# Patient Record
Sex: FEMALE | Race: WHITE | NOT HISPANIC OR LATINO | Employment: OTHER | URBAN - METROPOLITAN AREA
[De-identification: names, ages, dates, MRNs, and addresses within clinical notes are randomized per-mention and may not be internally consistent; named-entity substitution may affect disease eponyms.]

---

## 2017-01-20 ENCOUNTER — TELEPHONE (OUTPATIENT)
Dept: OBSTETRICS AND GYNECOLOGY | Facility: CLINIC | Age: 62
End: 2017-01-20

## 2017-01-20 NOTE — TELEPHONE ENCOUNTER
1 year since last period.  C/o Insomnia, hot flashes, and her joints feel funny.   She never took the Combipatch that you prescribed in April.  She is asking for a rx to help with menopause symptoms.     Allergies and pharmacy UTD

## 2017-01-20 NOTE — TELEPHONE ENCOUNTER
Dr kc pt calling, pt states that she is having menopause issues like hot flashes, night sweats and joint pain. Pt wants a medication to help with this and pt also states she was on a certain medication but has not clue what the name is she took in the past. Pt also said she spoke with someone yesterday but I see nothing in her chart.Pt # [phone] pharm pt [phone]

## 2017-01-23 NOTE — TELEPHONE ENCOUNTER
The pharmacy told her it was $163 for 1 month supply.  She is asking for a different rx that is possibly cheaper.

## 2017-01-24 RX ORDER — ESTRADIOL 2 MG/1
1 TABLET ORAL DAILY
Qty: 30 TABLET | Refills: 11 | Status: SHIPPED | OUTPATIENT
Start: 2017-01-24 | End: 2017-02-24

## 2017-01-24 RX ORDER — MEDROXYPROGESTERONE ACETATE 10 MG/1
10 TABLET ORAL DAILY
Qty: 30 TABLET | Refills: 11 | Status: SHIPPED | OUTPATIENT
Start: 2017-01-24 | End: 2017-02-24

## 2017-01-24 NOTE — TELEPHONE ENCOUNTER
There is no other cheaper transdermal option I rx po estradiol and provera take both daily, FYI may have some spotting first 3 months after starting, if is heavy or persists come in for visit with ultrasound.

## 2017-02-23 ENCOUNTER — TELEPHONE (OUTPATIENT)
Dept: OBSTETRICS AND GYNECOLOGY | Facility: CLINIC | Age: 62
End: 2017-02-23

## 2017-02-23 NOTE — TELEPHONE ENCOUNTER
Wax pt, has white patch on vagina that is not painful. Pt thinks she might be getting herpes on and off. Pt has not had period for a year.

## 2017-02-23 NOTE — TELEPHONE ENCOUNTER
Pt thinks she has a yeast infection and getting a HSV outbreak.  C/o itching and a white patch on the skin of her vaginal.  Scheduled appt tomorrow at 1030 with Dr. Rodriguez.

## 2017-02-24 ENCOUNTER — TELEPHONE (OUTPATIENT)
Dept: OBSTETRICS AND GYNECOLOGY | Facility: CLINIC | Age: 62
End: 2017-02-24

## 2017-02-24 ENCOUNTER — OFFICE VISIT (OUTPATIENT)
Dept: OBSTETRICS AND GYNECOLOGY | Facility: CLINIC | Age: 62
End: 2017-02-24
Payer: COMMERCIAL

## 2017-02-24 VITALS
SYSTOLIC BLOOD PRESSURE: 116 MMHG | BODY MASS INDEX: 20.08 KG/M2 | DIASTOLIC BLOOD PRESSURE: 74 MMHG | WEIGHT: 117.63 LBS | HEIGHT: 64 IN

## 2017-02-24 DIAGNOSIS — N76.0 ACUTE VAGINITIS: ICD-10-CM

## 2017-02-24 DIAGNOSIS — L90.0 LICHEN SCLEROSUS: Primary | ICD-10-CM

## 2017-02-24 LAB
CANDIDA RRNA VAG QL PROBE: NEGATIVE
G VAGINALIS RRNA GENITAL QL PROBE: NEGATIVE
T VAGINALIS RRNA GENITAL QL PROBE: NEGATIVE

## 2017-02-24 PROCEDURE — 1160F RVW MEDS BY RX/DR IN RCRD: CPT | Mod: S$GLB,,, | Performed by: OBSTETRICS & GYNECOLOGY

## 2017-02-24 PROCEDURE — 87480 CANDIDA DNA DIR PROBE: CPT

## 2017-02-24 PROCEDURE — 99213 OFFICE O/P EST LOW 20 MIN: CPT | Mod: S$GLB,,, | Performed by: OBSTETRICS & GYNECOLOGY

## 2017-02-24 PROCEDURE — 99999 PR PBB SHADOW E&M-EST. PATIENT-LVL III: CPT | Mod: PBBFAC,,, | Performed by: OBSTETRICS & GYNECOLOGY

## 2017-02-24 RX ORDER — CLOBETASOL PROPIONATE 0.5 MG/G
AEROSOL, FOAM TOPICAL NIGHTLY
Qty: 30 ML | Refills: 6 | Status: SHIPPED | OUTPATIENT
Start: 2017-02-24 | End: 2017-07-21

## 2017-02-24 RX ORDER — HYDROCORTISONE 25 MG/G
CREAM TOPICAL
Refills: 0 | COMMUNITY
Start: 2017-02-15 | End: 2017-07-21

## 2017-02-24 RX ORDER — LORAZEPAM 0.5 MG/1
0.5 TABLET ORAL 2 TIMES DAILY PRN
Refills: 5 | COMMUNITY
Start: 2017-02-14 | End: 2018-01-09

## 2017-02-24 RX ORDER — CLOBETASOL PROPIONATE 0.5 MG/G
CREAM TOPICAL NIGHTLY
Qty: 45 G | Refills: 4 | Status: SHIPPED | OUTPATIENT
Start: 2017-02-24 | End: 2017-02-24

## 2017-02-24 RX ORDER — BUSPIRONE HYDROCHLORIDE 10 MG/1
10 TABLET ORAL DAILY
Refills: 5 | COMMUNITY
Start: 2017-01-11 | End: 2018-05-09

## 2017-02-24 RX ORDER — ZOLPIDEM TARTRATE 5 MG/1
5 TABLET ORAL NIGHTLY PRN
Refills: 5 | COMMUNITY
Start: 2017-02-14 | End: 2017-07-21

## 2017-02-24 NOTE — TELEPHONE ENCOUNTER
Spoke with pharmacy - Clobetasol Emollient is a less expensive alternative. Spoke with patient who agrees to the change in treatment plan.

## 2017-02-24 NOTE — MR AVS SNAPSHOT
[facility]  [address]  [address]  [facility] [place] [facility] [address]  Phone: [phone]  Fax: [phone]                  Celeste Mohr   2017 10:30 AM   Office Visit    Description:  Female : 1955   Provider:  Yoli Rodriguez MD   [facility]  [facility]           Reason for Visit     Gynecologic Exam           Diagnoses this Visit        Comments    Lichen sclerosus    -  Primary     Acute vaginitis                To Do List           Future Appointments        Provider Department Dept Phone    2017 10:30 AM Corby Welch MD [facility] [phone]      Goals (5 Years of Data)     None       These Medications        Disp Refills Start End    clobetasol (TEMOVATE) 0.05 % cream 45 g 4 2017     Apply topically every evening. For 2 weeks, then twice per week. - Topical (Top)    Pharmacy: [facility] [address].  #: [phone]         [facility]     [facility] Nurse Care [facility] -  Assistance  Registered nurses in the [facility] On Call Center provide clinical advisement, health education, appointment booking, and other advisory services.  Call for this free service at [phone].             Medications           START taking these NEW medications        Refills    clobetasol (TEMOVATE) 0.05 % cream 4    Sig: Apply topically every evening. For 2 weeks, then twice per week.    Class: Normal    Route: Topical (Top)      STOP taking these medications     estradiol (ESTRACE) 2 MG tablet Take 0.5 tablets (1 mg total) by mouth once daily.    medroxyPROGESTERone (PROVERA) 10 MG tablet Take 1 tablet (10 mg total) by mouth once daily.    estradiol-norethindrone (COMBIPATCH) 0.05-0.25 mg/24 hr Place 1 patch onto the skin twice a week.           Verify that the below list of medications is an accurate representation of the medications you are currently taking.  If none reported,  the list may be blank. If incorrect, please contact your healthcare provider. Carry this list with you in case of emergency.           Current Medications     busPIRone (BUSPAR) 10 MG tablet Take 10 mg by mouth 2 (two) times daily.    fluoxetine (PROZAC) 20 MG capsule     fluticasone (FLONASE) 50 mcg/actuation nasal spray     levothyroxine (SYNTHROID) 25 MCG tablet Take 25 mcg by mouth once daily.      lorazepam (ATIVAN) 0.5 MG tablet Take 0.5 mg by mouth 2 (two) times daily as needed.    meloxicam (MOBIC) 15 MG tablet     SYNTHROID 112 mcg tablet     valacyclovir (VALTREX) 1000 MG tablet Take 1 tablet (1,000 mg total) by mouth once daily.    clobetasol (TEMOVATE) 0.05 % cream Apply topically every evening. For 2 weeks, then twice per week.    hydrocortisone (ANUSOL-HC) 2.5 % rectal cream APPLY RECTALLY THREE TIMES DAILY AS DIRECTED    hydrocortisone-pramoxine (PROCTOFOAM-HS) rectal foam Place 1 applicator rectally 2 (two) times daily.    VITAMIN D2 50,000 unit capsule     zolpidem (AMBIEN) 10 mg Tab     zolpidem (AMBIEN) 5 MG Tab Take 5 mg by mouth nightly as needed.           Clinical Reference Information           Your Vitals Were     BP                   116/74           Blood Pressure          Most Recent Value    BP  116/74      Allergies as of 2/24/2017     No Known Allergies      Immunizations Administered on Date of Encounter - 2/24/2017     None      Orders Placed During Today's Visit      Normal Orders This Visit    Vaginosis Screen by DNA Probe       [facility] Sign-Up     Activating your [facility] account is as easy as 1-2-3!     1) Visit [email or web address], select Sign Up Now, enter this activation code and your date of birth, then select Next.  [email or web address]  Expires: 4/10/2017 10:39 AM      2) Create a username and password to use when you visit [facility] in the future and select a security question in case you lose your password and select Next.    3) Enter your e-mail address and click Sign  Up!    Additional Information  If you have questions, please e-mail [email or web address] or call [phone] to talk to our [facility] staff. Remember, [facility] is NOT to be used for urgent needs. For medical emergencies, dial 911.         Language Assistance Services     ATTENTION: Language assistance services are available, free of charge. Please call [phone].      ATENCIÓN: Si habla español, tiene a sullivan disposición servicios gratuitos de asistencia lingüística. Llame al [phone].     [facility] Ý: N?u b?n nói Ti?ng Vi?t, có các d?ch v? h? tr? ngôn ng? mi?n phí dành cho b?n. G?i s? [phone].         [facility] complies with applicable Federal civil rights laws and does not discriminate on the basis of race, color, national origin, age, disability, or sex.

## 2017-02-24 NOTE — PROGRESS NOTES
"  Chief Complaint: Genital Pruritis     HPI:      Celeste Mohr is a 61 y.o.  who presents complaining of genital pruritis for several weeks. States that itching is external and is mainly around the vaginal introitus. She has also had pruritis and discomfort in the skin surrounding her rectum. She has been self-treating with hemorrhoid medications without improvement. Denies vaginal bleeding or abnormal discharge. Has a h/o HSV and is concerned this may be an outbreak, though she denies burning, tingling, or ulceration.  Ms. Mohr is not currently sexually active. Patient does not have regular monthly menses. Patient's last menstrual period was 01/15/2016.      ROS:     GENERAL: Denies unintentional weight gain or weight loss. Feeling well overall.   ABDOMEN: Denies abdominal pain, constipation, diarrhea, nausea, vomiting, change in appetite.   URINARY: Denies frequency, dysuria, hematuria.  GYNECOLOGIC: See HPI.  DERM: Denies rashes or lesions.     Physical Exam:      PHYSICAL EXAM:  /74  Ht 5' 4" (1.626 m)  Wt 53.4 kg (117 lb 9.9 oz)  LMP 01/15/2016  BMI 20.19 kg/m2  Body mass index is 20.19 kg/(m^2).     APPEARANCE: Well nourished, well developed, in no acute distress.  ABDOMEN: Soft.  No tenderness or masses.    PELVIC: Normal external genitalia with atrophy and hypopigmentation of bilateral labia minora extending onto majora. Normal hair distribution.  Adequate perineal body, normal urethral meatus.  Vagina moist and smooth without lesions or discharge.  Cervix pink, without lesions, discharge or tenderness.  No significant cystocele or rectocele.  Bimanual exam shows uterus to be normal size, regular, mobile and nontender.  Adnexa without masses or tenderness.    RECTUM: No external hemorrhoids. No internal masses palpable on CRYSTAL. No blood on glove.   EXTREMITIES: No edema.     Assessment/Plan:     Lichen sclerosus       -     Tried to call in clobetasol cream, but Rx was too expensive " for patient. Switched to clobetazol emollient which pharmacy reports should be cheaper.        -      Instructed to use clobetasol nightly x 2 weeks and then 2x/week.     Acute vaginitis  -     Vaginosis Screen by DNA Probe

## 2017-02-24 NOTE — TELEPHONE ENCOUNTER
Dr. Rodriguez patient calling=was just in office this a.m.- her new rx for cream is way too expensive, can she have something else?please call pt back at [phone]

## 2017-02-27 ENCOUNTER — TELEPHONE (OUTPATIENT)
Dept: OBSTETRICS AND GYNECOLOGY | Facility: CLINIC | Age: 62
End: 2017-02-27

## 2017-02-27 NOTE — TELEPHONE ENCOUNTER
Dr Welch pt calling stating that she came in and saw Dr. Rodriguez last week and the medicine she was given did not help.  States that she is still having pain. Hurts to sit. She is wondering if there is something else she can do or if she can use the cream she was prescribed twice a day.

## 2017-02-27 NOTE — TELEPHONE ENCOUNTER
Dr. Welch's pt calling, pt states that she saw Dr. Rodriguez for a yeast infection and was given a Rx that hasn't seemed to help. Pt states that she would like to speak to a nurse. Please call pt at [phone].

## 2017-03-01 RX ORDER — LIDOCAINE HYDROCHLORIDE 20 MG/ML
JELLY TOPICAL
Qty: 30 ML | Refills: 1 | Status: SHIPPED | OUTPATIENT
Start: 2017-03-01 | End: 2017-07-21

## 2017-03-01 NOTE — TELEPHONE ENCOUNTER
Lidocaine gel sent in for patient. If she's still having discomfort she should schedule a follow up appointment.

## 2017-03-07 ENCOUNTER — TELEPHONE (OUTPATIENT)
Dept: OBSTETRICS AND GYNECOLOGY | Facility: CLINIC | Age: 62
End: 2017-03-07

## 2017-03-07 NOTE — TELEPHONE ENCOUNTER
Called patient and conveyed negative test results. Patient's symptoms are slightly improved now that she has begun using Clobetazol. Encouraged her to keep using cream nightly until sx are under better control. Pt has f/u scheduled with Dr. Welch in April - advised her to let us know before hand if symptoms stop improving or get worse.

## 2017-03-09 ENCOUNTER — TELEPHONE (OUTPATIENT)
Dept: OBSTETRICS AND GYNECOLOGY | Facility: CLINIC | Age: 62
End: 2017-03-09

## 2017-03-09 NOTE — TELEPHONE ENCOUNTER
Pt states she was here 2 weeks ago with Dr. Rodriguez for vaginal discharge and itching.  States the white patch is getting bigger.   She was given Clobetasol-emollient cream to apply to the butt and outside of vagina.  Should she come in again to be evaluated or is there something else she can take or use.  She also has lidocaine cream she can apply for the pain.  She has an appt with you 4/20 for her annual/follow.

## 2017-03-09 NOTE — TELEPHONE ENCOUNTER
Dr. Welch's pt calling, states that she saw Dr. Rodriguez for vaginal discharge and itching and states that her symptoms have not improved and would like to be seen. Please call pt at [phone]

## 2017-03-10 ENCOUNTER — OFFICE VISIT (OUTPATIENT)
Dept: OBSTETRICS AND GYNECOLOGY | Facility: CLINIC | Age: 62
End: 2017-03-10
Attending: OBSTETRICS & GYNECOLOGY
Payer: COMMERCIAL

## 2017-03-10 VITALS
BODY MASS INDEX: 19.76 KG/M2 | WEIGHT: 115.75 LBS | SYSTOLIC BLOOD PRESSURE: 118 MMHG | DIASTOLIC BLOOD PRESSURE: 76 MMHG | HEIGHT: 64 IN

## 2017-03-10 DIAGNOSIS — L90.0 LICHEN SCLEROSUS: Primary | ICD-10-CM

## 2017-03-10 PROCEDURE — 1160F RVW MEDS BY RX/DR IN RCRD: CPT | Mod: S$GLB,,, | Performed by: OBSTETRICS & GYNECOLOGY

## 2017-03-10 PROCEDURE — 99213 OFFICE O/P EST LOW 20 MIN: CPT | Mod: S$GLB,,, | Performed by: OBSTETRICS & GYNECOLOGY

## 2017-03-10 PROCEDURE — 99999 PR PBB SHADOW E&M-EST. PATIENT-LVL III: CPT | Mod: PBBFAC,,, | Performed by: OBSTETRICS & GYNECOLOGY

## 2017-03-10 RX ORDER — CLOBETASOL PROPIONATE 0.5 MG/G
EMULSION TOPICAL
COMMUNITY
Start: 2017-02-24 | End: 2017-03-10 | Stop reason: SDUPTHER

## 2017-03-10 RX ORDER — CLOBETASOL PROPIONATE 0.5 MG/G
OINTMENT TOPICAL 3 TIMES DAILY
Qty: 1 TUBE | Refills: 6 | Status: SHIPPED | OUTPATIENT
Start: 2017-03-10 | End: 2017-07-21

## 2017-03-10 NOTE — MR AVS SNAPSHOT
[facility]  [address]  [address]  [place] [facility] [facility] [address]  Phone: [phone]  Fax: [phone]                  Celeste Mohr   3/10/2017 3:45 PM   Office Visit    Description:  Female : 1955   Provider:  Corby Welch MD   Department:  [facility]           Reason for Visit     vaginal discomfort           Diagnoses this Visit        Comments    Lichen sclerosus    -  Primary            To Do List           Future Appointments        Provider Department Dept Phone    2017 2:30 PM Corby Welch MD [facility] [phone]    2017 10:30 AM Corby Welch MD [facility] [phone]      Goals (5 Years of Data)     None       These Medications        Disp Refills Start End    clobetasol 0.05% (TEMOVATE) 0.05 % Oint 1 Tube 6 3/10/2017 3/20/2017    Apply topically 3 (three) times daily. - Topical    Pharmacy: [facility] - [address] Ph #: [phone]       estradiol-norethindrone (COMBIPATCH) 0.05-0.25 mg/24 hr 8 patch 6 3/13/2017     Place 1 patch onto the skin twice a week. - Transdermal    Pharmacy: [facility] - [address]. Ph #: [phone]         [facility]     [facility] On Call Nurse Care Line -  Assistance  Registered nurses in the [facility] On Call Center provide clinical advisement, health education, appointment booking, and other advisory services.  Call for this free service at [phone].             Medications           START taking these NEW medications        Refills    clobetasol 0.05% (TEMOVATE) 0.05 % Oint 6    Sig: Apply topically 3 (three) times daily.    Class: Normal    Route: Topical    estradiol-norethindrone (COMBIPATCH) 0.05-0.25 mg/24 hr 6    Starting on: 3/13/2017    Sig: Place 1 patch onto the skin twice a week.    Class: Normal    Route: Transdermal      STOP taking these medications      "clobetasol-emollient 0.05 % Crea            Verify that the below list of medications is an accurate representation of the medications you are currently taking.  If none reported, the list may be blank. If incorrect, please contact your healthcare provider. Carry this list with you in case of emergency.           Current Medications     busPIRone (BUSPAR) 10 MG tablet Take 10 mg by mouth 2 (two) times daily.    clobetasol-emollient (OLUX-E) 0.05 % topical foam Apply topically every evening. For 2 weeks, then twice per week    fluoxetine (PROZAC) 20 MG capsule     fluticasone (FLONASE) 50 mcg/actuation nasal spray     levothyroxine (SYNTHROID) 25 MCG tablet Take 25 mcg by mouth once daily.      lidocaine HCL 2% (XYLOCAINE) 2 % jelly Apply to affected area daily prn    lorazepam (ATIVAN) 0.5 MG tablet Take 0.5 mg by mouth 2 (two) times daily as needed.    meloxicam (MOBIC) 15 MG tablet     valacyclovir (VALTREX) 1000 MG tablet Take 1 tablet (1,000 mg total) by mouth once daily.    VITAMIN D2 50,000 unit capsule     zolpidem (AMBIEN) 5 MG Tab Take 5 mg by mouth nightly as needed.    clobetasol 0.05% (TEMOVATE) 0.05 % Oint Apply topically 3 (three) times daily.    estradiol-norethindrone (COMBIPATCH) 0.05-0.25 mg/24 hr Starting on Mar 13, 2017. Place 1 patch onto the skin twice a week.    hydrocortisone (ANUSOL-HC) 2.5 % rectal cream APPLY RECTALLY THREE TIMES DAILY AS DIRECTED    hydrocortisone-pramoxine (PROCTOFOAM-HS) rectal foam Place 1 applicator rectally 2 (two) times daily.           Clinical Reference Information           Your Vitals Were     BP Height Weight Last Period BMI    118/76 5' 4" (1.626 m) 52.5 kg (115 lb 11.9 oz) 01/15/2016 19.87 kg/m2      Blood Pressure          Most Recent Value    BP  118/76      Allergies as of 3/10/2017     No Known Allergies      Immunizations Administered on Date of Encounter - 3/10/2017     None      [facility] Sign-Up     Activating your [facility] account is as easy as " 1-2-3!     1) Visit [email or web address], select Sign Up Now, enter this activation code and your date of birth, then select Next.  [email or web address]  Expires: 4/10/2017 10:39 AM      2) Create a username and password to use when you visit [facility] in the future and select a security question in case you lose your password and select Next.    3) Enter your e-mail address and click Sign Up!    Additional Information  If you have questions, please e-mail [email or web address] or call [phone] to talk to our [facility] staff. Remember, [facility] is NOT to be used for urgent needs. For medical emergencies, dial 911.         Language Assistance Services     ATTENTION: Language assistance services are available, free of charge. Please call [phone].      ATENCIÓN: Si habla español, tiene a sullivan disposición servicios gratuitos de asistencia lingüística. Llame al [phone].     CHÚ Ý: N?u b?n nói Ti?ng Vi?t, có các d?ch v? h? tr? ngôn ng? mi?n phí dành cho b?n. G?i s? [phone].         [facility] complies with applicable Federal civil rights laws and does not discriminate on the basis of race, color, national origin, age, disability, or sex.

## 2017-03-10 NOTE — PROGRESS NOTES
"CC: perineal pain    Celeste Mohr is a 61 y.o. female  having more pain than itching from feeling raw.   Used the clobetasol nightly x one week and now on twice weekly but feels no improvement.  Did feel improvement when she used the clobetasol nightly.  No bleeding.  Also having hot flashes and mood swings. Tried oral combo pills but was ravenous on it and gained weight.      Past Medical History:   Diagnosis Date    Hypothyroid        History reviewed. No pertinent surgical history.    OB History    Para Term  AB SAB TAB Ectopic Multiple Living   3    1     2      # Outcome Date GA Lbr Antonio/2nd Weight Sex Delivery Anes PTL Lv   3       Vag-Spont      2       Vag-Spont      1 AB                   Family History   Problem Relation Age of Onset    No Known Problems Mother     No Known Problems Father     No Known Problems Sister     No Known Problems Brother     No Known Problems Maternal Aunt     No Known Problems Maternal Uncle     No Known Problems Paternal Aunt     No Known Problems Paternal Uncle     No Known Problems Maternal Grandmother     No Known Problems Maternal Grandfather     No Known Problems Paternal Grandmother     No Known Problems Paternal Grandfather     Blindness Neg Hx     Cancer Neg Hx     Cataracts Neg Hx     Diabetes Neg Hx     Glaucoma Neg Hx     Amblyopia Neg Hx     Hypertension Neg Hx     Macular degeneration Neg Hx     Retinal detachment Neg Hx     Strabismus Neg Hx     Stroke Neg Hx     Thyroid disease Neg Hx        Social History   Substance Use Topics    Smoking status: Former Smoker    Smokeless tobacco: Never Used    Alcohol use Yes       /76  Ht 5' 4" (1.626 m)  Wt 52.5 kg (115 lb 11.9 oz)  LMP 01/15/2016  BMI 19.87 kg/m2    ROS:  GENERAL: Denies weight gain or weight loss. Feeling well overall.   SKIN: Denies rash or lesions.   HEAD: Denies head injury or headache.   NODES: Denies enlarged lymph nodes. "   CHEST: Denies chest pain or shortness of breath.   CARDIOVASCULAR: Denies palpitations or left sided chest pain.   ABDOMEN: No abdominal pain, constipation, diarrhea, nausea, vomiting or rectal bleeding.   URINARY: No frequency, dysuria, hematuria, or burning on urination.  REPRODUCTIVE: See HPI.   BREASTS: denies pain, lumps, or nipple discharge.   HEMATOLOGIC: No easy bruisability or excessive bleeding.  MUSCULOSKELETAL: Denies joint pain or swelling.   NEUROLOGIC: Denies syncope or weakness.   PSYCHIATRIC: Denies depression, anxiety or mood swings.    Physical Exam:    APPEARANCE: Well nourished, well developed, in no acute distress.  AFFECT: WNL, alert and oriented x 3  SKIN: No acne or hirsutism  NECK: Neck symmetric without masses or thyromegaly  NODES: No inguinal, cervical, axillary, or femoral lymph node enlargement  CHEST: Good respiratory effect  ABDOMEN: Soft.  No tenderness or masses.  No hepatosplenomegaly.  No hernias.  PELVIC: Normal external genitalia without lesions.  Normal hair distribution.  Adequate perineal body, normal urethral meatus.  Vagina moist and well rugated without lesions or discharge.  Cervix pink, without lesions, discharge or tenderness.  No significant cystocele or rectocele.  Bimanual exam shows uterus to be normal size, regular, mobile and nontender.  Adnexa without masses or tenderness.    EXTREMITIES: No edema.    ASSESSMENT AND PLAN    Celeste was seen today for vaginal discomfort.    Diagnoses and all orders for this visit:    Lichen sclerosus  -     clobetasol 0.05% (TEMOVATE) 0.05 % Oint; Apply topically 3 (three) times daily.  -     estradiol-norethindrone (COMBIPATCH) 0.05-0.25 mg/24 hr; Place 1 patch onto the skin twice a week.    Exam with no raised hyperkeratotic lesion.  Circumferential white lesion around anus and small patch on perineal body.  Reassurance, no bx indicated today.  Needs to maximize her treatment of clobetasol to TID for one month and follow up.   Consider down to BID month after, nightly x one month then twice weekly.  Try combipatch and consider topical estrace if needed next visit.     Return in about 4 weeks (around 4/7/2017).

## 2017-04-20 ENCOUNTER — TELEPHONE (OUTPATIENT)
Dept: OBSTETRICS AND GYNECOLOGY | Facility: CLINIC | Age: 62
End: 2017-04-20

## 2017-04-20 NOTE — TELEPHONE ENCOUNTER
Dr. Welch's pt calling, states that she needs to schedule a f/u appt for a problem that she is having and would like to be fit in on the schedule. Pt would not get any other info. Pt's # [phone].

## 2017-04-20 NOTE — TELEPHONE ENCOUNTER
Pt states she has Lichen Sclerosus.  She states she is having discomfort around her rectum only at night.  She thought her appt was for tomorrow instead of today so she didn't make it in.  Scheduled Thursday with Dr. Welch.

## 2017-05-22 ENCOUNTER — OFFICE VISIT (OUTPATIENT)
Dept: OBSTETRICS AND GYNECOLOGY | Facility: CLINIC | Age: 62
End: 2017-05-22
Attending: OBSTETRICS & GYNECOLOGY
Payer: COMMERCIAL

## 2017-05-22 VITALS — BODY MASS INDEX: 19.77 KG/M2 | WEIGHT: 115.19 LBS | DIASTOLIC BLOOD PRESSURE: 82 MMHG | SYSTOLIC BLOOD PRESSURE: 98 MMHG

## 2017-05-22 DIAGNOSIS — A60.00 GENITAL HERPES SIMPLEX, UNSPECIFIED SITE: ICD-10-CM

## 2017-05-22 DIAGNOSIS — B37.31 VAGINAL YEAST INFECTION: Primary | ICD-10-CM

## 2017-05-22 LAB
GARDNERELLA VAGINALIS: NEGATIVE
OTHER MICROSC. OBSERVATIONS: ABNORMAL
TRICHOMONAS, POC: NEGATIVE
YEAST WET PREP: POSITIVE

## 2017-05-22 PROCEDURE — 99999 PR PBB SHADOW E&M-EST. PATIENT-LVL III: CPT | Mod: PBBFAC,,, | Performed by: NURSE PRACTITIONER

## 2017-05-22 PROCEDURE — 87210 SMEAR WET MOUNT SALINE/INK: CPT | Mod: S$GLB,,, | Performed by: NURSE PRACTITIONER

## 2017-05-22 PROCEDURE — 99214 OFFICE O/P EST MOD 30 MIN: CPT | Mod: S$GLB,,, | Performed by: NURSE PRACTITIONER

## 2017-05-22 PROCEDURE — 1160F RVW MEDS BY RX/DR IN RCRD: CPT | Mod: S$GLB,,, | Performed by: NURSE PRACTITIONER

## 2017-05-22 RX ORDER — FLUCONAZOLE 150 MG/1
TABLET ORAL
Qty: 2 TABLET | Refills: 0 | Status: SHIPPED | OUTPATIENT
Start: 2017-05-22 | End: 2017-07-21

## 2017-05-22 RX ORDER — VALACYCLOVIR HYDROCHLORIDE 500 MG/1
500 TABLET, FILM COATED ORAL DAILY
Qty: 30 TABLET | Refills: 6 | Status: SHIPPED | OUTPATIENT
Start: 2017-05-22 | End: 2018-07-25 | Stop reason: SDUPTHER

## 2017-05-22 RX ORDER — LEVOTHYROXINE SODIUM 112 UG/1
TABLET ORAL
Refills: 5 | COMMUNITY
Start: 2017-04-19 | End: 2018-01-09

## 2017-05-22 RX ORDER — FLUCONAZOLE 150 MG/1
TABLET ORAL
Qty: 2 TABLET | Refills: 0 | Status: SHIPPED | OUTPATIENT
Start: 2017-05-22 | End: 2017-05-22 | Stop reason: SDUPTHER

## 2017-05-22 NOTE — PROGRESS NOTES
Chief complaint: Vaginal Irritation/Itching    HPI: Vaginal Irritation/itching for the last 7 days the itching and irritation is more on the inside of the vagina but she does have it on the outside as well. No significant changes in discharge prior to using a one day OTC medication. She reports no changes in sexual partners but did start having intercourse for the first time in a while. She also had a recent HSV outbreak that was healing and bikini wax that was irritated after. Denies pelvic pain, fever, chills, n/v/lesions or cuts.      LC--pt reports using the steroids as ordered and hasn't had any symptoms in the last month    HSV--usually has a few outbreaks a year but recently with increased stress they have been more frequent and would like to take suppressive therapy again at this time.       ROS   Systemic: Not feeling tired (fatigue).  No fever chills   Gastrointestinal: No nausea, vomiting, no abdominal pain.  No diarrhea.  Genitourinary: No dysuria. No Pelvic Pain  Skin: No rash.    BP 98/82   Wt 52.3 kg (115 lb 3.1 oz)   LMP 01/15/2016   BMI 19.77 kg/m²     Physical Exam   Vital Signs:° Normal.  General Appearance:° Well developed. ° Well nourished.  Lymph Nodes: no  Inguinal LAD  Urinary System:° Normal. Urethra: ° Meatus showed no abnormalities. ° No mass on the urethra.  Genitalia: External: ° Vulva was normal. ° Genitalia exhibited no lesion.                    Vagina: ° Mucosa was normal. ° A vaginal discharge was observed thick white non-odorous discharged, small amt   Pelvic: Cervix: ° No cervical discharge. ° Showed no lesion. ° Not tender, no CMT         Uterus: ° Position was normal. ° Not tender.  Neurological:° No disorientation was observed.  Psychiatric:Affect: ° Normal.  Skin:° No skin lesions.   Perineum:° Normal. ° Did not have a mass.° No perineal lesions/rashes/erythema     Assessment/Plan:  1. Yeast infection-wet mount positive for yeast.  Diflucan 150mg po today and repeat in 2  days, external Monistat cream as needed for symptom relief. Prevention measures discussed/educated.     2. HSV-- ordered suppressive therapy,      3. LC-- pt reports this is stable and no symptoms in month after steroid cream, she will keep annual visit in Aug     RTC prn and as scheduled for annual exam.

## 2017-05-25 ENCOUNTER — TELEPHONE (OUTPATIENT)
Dept: OBSTETRICS AND GYNECOLOGY | Facility: CLINIC | Age: 62
End: 2017-05-25

## 2017-05-25 NOTE — TELEPHONE ENCOUNTER
Pt thinks the yeast infection is getting worse.  She took 2 Diflucan pills one of Monday and one Tuesday.  She still has 2 pills left.  I advised if she is still having discharge on Saturday take 1 pill, wait 3 days and if the discharge is still not better to call for a different medication on Tuesday.  Verbalized understanding.

## 2017-05-25 NOTE — TELEPHONE ENCOUNTER
Dr. Welch--pt was treated with Diflucan for a yeast infection. Completed Rx two days ago and isnt feeling any relief. Pt wants to know if this is normal or if she can have another Rx. Pt's # [phone]

## 2017-06-15 ENCOUNTER — TELEPHONE (OUTPATIENT)
Dept: OBSTETRICS AND GYNECOLOGY | Facility: CLINIC | Age: 62
End: 2017-06-15

## 2017-06-15 RX ORDER — TERCONAZOLE 8 MG/G
1 CREAM VAGINAL NIGHTLY
Qty: 20 G | Refills: 0 | Status: SHIPPED | OUTPATIENT
Start: 2017-06-15 | End: 2017-06-18

## 2017-06-15 NOTE — TELEPHONE ENCOUNTER
Yuri Salomon pt saw Maria Eugenia on 5/22 and was treated for a yeast infection. She said the infection is back and would like to know what she should do.

## 2017-06-15 NOTE — TELEPHONE ENCOUNTER
Pt called to schedule annual, she said she was at the office on 5/25 for her annual appt and  had to leave for a delivery so she saw Maria Eugenia but did not do anything for the annual visit, it was b/c she had a yeast infection. Pt is scheduled for 9/15 for her annual but would like to know if there is any way she can be fit in sooner since she her previous appt had to be cancelled.

## 2017-06-15 NOTE — TELEPHONE ENCOUNTER
Pt states she still has a yeast infection.  She took Diflucan that Maria Eugenia prescribed as well as another 1 or 2 pills because she was given extra.  She is still having a discharge. Recommended the Terazol cream and if not better after using the cream she should be tested again to see if its still yeast or something else.

## 2017-07-21 ENCOUNTER — OFFICE VISIT (OUTPATIENT)
Dept: OBSTETRICS AND GYNECOLOGY | Facility: CLINIC | Age: 62
End: 2017-07-21
Attending: OBSTETRICS & GYNECOLOGY
Payer: COMMERCIAL

## 2017-07-21 VITALS
WEIGHT: 114.06 LBS | SYSTOLIC BLOOD PRESSURE: 102 MMHG | DIASTOLIC BLOOD PRESSURE: 64 MMHG | HEIGHT: 64 IN | BODY MASS INDEX: 19.47 KG/M2

## 2017-07-21 DIAGNOSIS — Z01.419 ENCOUNTER FOR GYNECOLOGICAL EXAMINATION: Primary | ICD-10-CM

## 2017-07-21 PROCEDURE — 99396 PREV VISIT EST AGE 40-64: CPT | Mod: S$GLB,,, | Performed by: OBSTETRICS & GYNECOLOGY

## 2017-07-21 PROCEDURE — 99999 PR PBB SHADOW E&M-EST. PATIENT-LVL II: CPT | Mod: PBBFAC,,, | Performed by: OBSTETRICS & GYNECOLOGY

## 2017-07-21 RX ORDER — ZOLPIDEM TARTRATE 10 MG/1
10 TABLET ORAL NIGHTLY PRN
Refills: 5 | COMMUNITY
Start: 2017-06-13 | End: 2018-05-09

## 2017-07-21 NOTE — PROGRESS NOTES
"CC: Well woman exam    Celeste Mohr is a 61 y.o. female  presents for a well woman exam.  No more vaginal itching. LMP: Patient's last menstrual period was 01/15/2016..  No c/o.    Past Medical History:   Diagnosis Date    Genital herpes     Hypothyroid     Lichen sclerosus     Seasonal allergies      Past Surgical History:   Procedure Laterality Date    breast reduction      FACIAL COSMETIC SURGERY       Social History     Social History    Marital status:      Spouse name: N/A    Number of children: N/A    Years of education: N/A     Social History Main Topics    Smoking status: Former Smoker    Smokeless tobacco: Never Used    Alcohol use Yes      Comment: "not much"    Drug use: No    Sexual activity: Yes     Other Topics Concern    None     Social History Narrative    None     Family History   Problem Relation Age of Onset    Colon cancer Mother     No Known Problems Father     Ovarian cancer Sister     No Known Problems Brother     No Known Problems Maternal Aunt     No Known Problems Maternal Uncle     No Known Problems Paternal Aunt     No Known Problems Paternal Uncle     No Known Problems Maternal Grandmother     No Known Problems Maternal Grandfather     No Known Problems Paternal Grandmother     No Known Problems Paternal Grandfather     Blindness Neg Hx     Cataracts Neg Hx     Diabetes Neg Hx     Glaucoma Neg Hx     Amblyopia Neg Hx     Hypertension Neg Hx     Macular degeneration Neg Hx     Retinal detachment Neg Hx     Strabismus Neg Hx     Stroke Neg Hx     Thyroid disease Neg Hx     Breast cancer Neg Hx      OB History      Para Term  AB Living    3       1 2    SAB TAB Ectopic Multiple Live Births                       /64   Ht 5' 3.5" (1.613 m)   Wt 51.8 kg (114 lb 1.4 oz)   LMP 01/15/2016   BMI 19.89 kg/m²       ROS:    ROS:  GENERAL: Denies weight gain or weight loss. Feeling well overall.   SKIN: Denies rash or " lesions.   HEAD: Denies head injury or headache.   NODES: Denies enlarged lymph nodes.   CHEST: Denies chest pain or shortness of breath.   CARDIOVASCULAR: Denies palpitations or left sided chest pain.   ABDOMEN: No abdominal pain, constipation, diarrhea, nausea, vomiting or rectal bleeding.   URINARY: No frequency, dysuria, hematuria, or burning on urination.  REPRODUCTIVE: See HPI.   BREASTS: The patient performs breast self-examination and denies pain, lumps, or nipple discharge.   HEMATOLOGIC: No easy bruisability or excessive bleeding.   MUSCULOSKELETAL: Denies joint pain or swelling.   NEUROLOGIC: Denies syncope or weakness.   PSYCHIATRIC: Denies depression, anxiety or mood swings.    PHYSICAL EXAM:    APPEARANCE: Well nourished, well developed, in no acute distress.  AFFECT: WNL, alert and oriented x 3  SKIN: No acne or hirsutism  NECK: Neck symmetric without masses or thyromegaly  NODES: No inguinal, cervical, axillary, or femoral lymph node enlargement  CHEST: Good respiratory effect  ABDOMEN: Soft.  No tenderness or masses.  No hepatosplenomegaly.  No hernias.  BREASTS: Symmetrical, no skin changes or visible lesions.  No palpable masses, nipple discharge bilaterally.  PELVIC: Normal external genitalia without lesions.  Normal hair distribution.  Adequate perineal body, normal urethral meatus.  Vagina moist and well rugated without lesions or discharge.  Cervix pink, without lesions, discharge or tenderness.  No significant cystocele or rectocele.  Bimanual exam shows uterus to be normal size, regular, mobile and nontender.  Adnexa without masses or tenderness.    RECTAL: Rectovaginal exam confirms above with normal sphincter tone, no masses.  EXTREMITIES: No edema.      ICD-10-CM ICD-9-CM    1. Encounter for gynecological examination Z01.419 V72.31          Patient was counseled today on A.C.S. Pap guidelines and recommendations for yearly pelvic exams, mammograms and monthly self breast exams; to see her  PCP for other health maintenance.     Return in about 1 year (around 7/21/2018).

## 2017-07-26 ENCOUNTER — OFFICE VISIT (OUTPATIENT)
Dept: OPTOMETRY | Facility: CLINIC | Age: 62
End: 2017-07-26
Payer: COMMERCIAL

## 2017-07-26 DIAGNOSIS — Z46.0 FITTING AND ADJUSTMENT OF SPECTACLES AND CONTACT LENSES: Primary | ICD-10-CM

## 2017-07-26 DIAGNOSIS — H04.123 BILATERAL DRY EYES: Primary | ICD-10-CM

## 2017-07-26 DIAGNOSIS — H52.4 HYPEROPIA WITH PRESBYOPIA, BILATERAL: ICD-10-CM

## 2017-07-26 DIAGNOSIS — H52.03 HYPEROPIA WITH PRESBYOPIA, BILATERAL: ICD-10-CM

## 2017-07-26 PROCEDURE — 99999 PR PBB SHADOW E&M-EST. PATIENT-LVL II: CPT | Mod: PBBFAC,,, | Performed by: OPTOMETRIST

## 2017-07-26 PROCEDURE — 92012 INTRM OPH EXAM EST PATIENT: CPT | Mod: S$GLB,,, | Performed by: OPTOMETRIST

## 2017-07-26 PROCEDURE — 92310 CONTACT LENS FITTING OU: CPT | Mod: ,,, | Performed by: OPTOMETRIST

## 2017-07-27 NOTE — PROGRESS NOTES
HPI     Blur ou at near x mos, no assoc pain or red, constant, no relief over   time.  Wants to try new contacts, still dry some uncomfort    Last edited by Roland Oates, OD on 7/27/2017  8:04 AM. (History)            Assessment /Plan     For exam results, see Encounter Report.    Bilateral dry eyes    Hyperopia with presbyopia, bilateral      1. Switched contact lens brands. Gave rx for old and new. Pt to try. Recent dfe with PVD at [facility].   2.  Contact lens trials dispensed to pt. Daily wear only advised, with education to risks of extended wear.  Discussed lens care, compliance and solutions.

## 2018-01-08 ENCOUNTER — TELEPHONE (OUTPATIENT)
Dept: OBSTETRICS AND GYNECOLOGY | Facility: CLINIC | Age: 63
End: 2018-01-08

## 2018-01-08 DIAGNOSIS — R92.30 DENSE BREAST TISSUE: ICD-10-CM

## 2018-01-08 DIAGNOSIS — Z12.31 SCREENING MAMMOGRAM, ENCOUNTER FOR: Primary | ICD-10-CM

## 2018-01-08 DIAGNOSIS — R92.8 ABNORMAL MAMMOGRAM: Primary | ICD-10-CM

## 2018-01-08 NOTE — TELEPHONE ENCOUNTER
Wax pt was having a discharge, but took some medicine and isn't quite having a discharge anymore but her urine still has an odor and is uncomfortable. Pt is also having painful intercourse and is wondering about a cream for that. Pt can be reached at:[phone]

## 2018-01-08 NOTE — TELEPHONE ENCOUNTER
Pt thinks she had a yeast infection.  Took medication and it went away.  C/o urinary odor and vaginal discomfort.  Scheduled appt with Maria Eugenia tomorrow.     Pt needs her annual screening mammogram.  I asked if she has a h/o abnormal mammograms and needed a diagnostic mammo.  States she just needs a screening mammogram.  Faxed to [facility]

## 2018-01-08 NOTE — TELEPHONE ENCOUNTER
Pt would like mammo orders faxed to [facility].  [phone]    Pt would like to be contacted once they are sent so she can make her appt.  [phone]

## 2018-01-09 ENCOUNTER — OFFICE VISIT (OUTPATIENT)
Dept: OBSTETRICS AND GYNECOLOGY | Facility: CLINIC | Age: 63
End: 2018-01-09
Payer: COMMERCIAL

## 2018-01-09 ENCOUNTER — TELEPHONE (OUTPATIENT)
Dept: OPTOMETRY | Facility: CLINIC | Age: 63
End: 2018-01-09

## 2018-01-09 VITALS
DIASTOLIC BLOOD PRESSURE: 80 MMHG | WEIGHT: 112.19 LBS | HEIGHT: 63 IN | SYSTOLIC BLOOD PRESSURE: 98 MMHG | BODY MASS INDEX: 19.88 KG/M2

## 2018-01-09 DIAGNOSIS — N39.0 ACUTE UTI: ICD-10-CM

## 2018-01-09 DIAGNOSIS — N89.8 VAGINAL IRRITATION: ICD-10-CM

## 2018-01-09 DIAGNOSIS — R82.90 ABNORMAL URINE ODOR: Primary | ICD-10-CM

## 2018-01-09 DIAGNOSIS — Z11.3 SCREENING FOR STDS (SEXUALLY TRANSMITTED DISEASES): ICD-10-CM

## 2018-01-09 LAB
BILIRUB SERPL-MCNC: ABNORMAL MG/DL
BLOOD URINE, POC: ABNORMAL
COLOR, POC UA: ABNORMAL
GLUCOSE UR QL STRIP: ABNORMAL
KETONES UR QL STRIP: ABNORMAL
LEUKOCYTE ESTERASE URINE, POC: ABNORMAL
NITRITE, POC UA: POSITIVE
PH, POC UA: 7
PROTEIN, POC: ABNORMAL
SPECIFIC GRAVITY, POC UA: 1.01
UROBILINOGEN, POC UA: ABNORMAL

## 2018-01-09 PROCEDURE — 87491 CHLMYD TRACH DNA AMP PROBE: CPT

## 2018-01-09 PROCEDURE — 87088 URINE BACTERIA CULTURE: CPT

## 2018-01-09 PROCEDURE — 87086 URINE CULTURE/COLONY COUNT: CPT

## 2018-01-09 PROCEDURE — 87480 CANDIDA DNA DIR PROBE: CPT

## 2018-01-09 PROCEDURE — 87077 CULTURE AEROBIC IDENTIFY: CPT

## 2018-01-09 PROCEDURE — 81002 URINALYSIS NONAUTO W/O SCOPE: CPT | Mod: S$GLB,,, | Performed by: NURSE PRACTITIONER

## 2018-01-09 PROCEDURE — 99214 OFFICE O/P EST MOD 30 MIN: CPT | Mod: 25,S$GLB,, | Performed by: NURSE PRACTITIONER

## 2018-01-09 PROCEDURE — 87186 SC STD MICRODIL/AGAR DIL: CPT

## 2018-01-09 PROCEDURE — 99999 PR PBB SHADOW E&M-EST. PATIENT-LVL IV: CPT | Mod: PBBFAC,,, | Performed by: NURSE PRACTITIONER

## 2018-01-09 RX ORDER — LEVOTHYROXINE SODIUM 100 UG/1
125 TABLET ORAL
COMMUNITY
Start: 2018-01-08 | End: 2018-12-17

## 2018-01-09 RX ORDER — NITROFURANTOIN 25; 75 MG/1; MG/1
100 CAPSULE ORAL 2 TIMES DAILY
Qty: 14 CAPSULE | Refills: 0 | Status: SHIPPED | OUTPATIENT
Start: 2018-01-09 | End: 2018-01-16

## 2018-01-09 RX ORDER — NITAZOXANIDE 500 MG/1
500 TABLET ORAL 2 TIMES DAILY
Refills: 1 | COMMUNITY
Start: 2017-12-15 | End: 2018-01-09

## 2018-01-09 RX ORDER — ZOLPIDEM TARTRATE 5 MG/1
5 TABLET ORAL NIGHTLY PRN
Refills: 0 | COMMUNITY
Start: 2017-12-31 | End: 2018-12-21

## 2018-01-09 NOTE — PROGRESS NOTES
"Chief complaint: Vaginal Irritation/Itching    HPI: Vaginal Irritation/itching and urinary odor for the last several days. She says she took an OTC monistat about one to two months ago and still hasn't felt the same. She then started with a urinary odor with frquency but no dysuria/hematuria/leakage. She has recently restarted intercourse and with a new partner. It was uncomfortable but more because hasn't had intercourse in a while. She reports no changes in, soaps, detergents, douching. She has not used anything OTC and denies pelvic pain, fever, chills, n/v/lesions or cuts. No recent HSV outbreaks or problems with LC. She did have travel to [place] and after didn't "feel right" and took antiprotozola but still feeling off and has apt with PCP today    ROS   Systemic: Not feeling tired (fatigue).  No fever chills   Gastrointestinal: No nausea, vomiting, no abdominal pain.  No diarrhea.  Genitourinary: No dysuria. No Pelvic Pain  Skin: No rash.    BP 98/80   Ht 5' 3" (1.6 m)   Wt 50.9 kg (112 lb 3.4 oz)   LMP 01/15/2016   BMI 19.88 kg/m²     Physical Exam   Vital Signs:° Normal.  General Appearance:° Well developed. ° Well nourished.  Lymph Nodes: no Inguinal LAD  Urinary System:° Normal. Urethra: ° Meatus showed no abnormalities. ° No mass on the urethra.  Genitalia: External: ° Vulva was normal. ° Genitalia exhibited no lesion.                    Vagina: ° Mucosa was mildly atrohied. ° A vaginal discharge was observed scant amt of white thin  Pelvic: Cervix: ° No cervical discharge. ° Showed no lesion. ° Not tender, no CMT         Uterus: ° Position was normal. ° Not tender.  Neurological:° No disorientation was observed.  Psychiatric:Affect: ° Normal.  Skin:° No skin lesions.   Perineum:° Normal. ° Did not have a mass.° No perineal lesions/rashes/erythema     Assessment/Plan:  1. UTI abnormal urinalysis, ordered culture and Macrobid and will call with results. precautions given and prevention measures " discussed.     2. STD screening--GC and affirm ordered and will call with results    3. Pain with intercourse-- could be from atrophy but not sure if related to non-treated UTI vs vaginitis. Discussed lets treat currently an lubrication. If still not better then will discuss estrogen cream    RTC prn and as scheduled for annual exam.

## 2018-01-09 NOTE — PATIENT INSTRUCTIONS
Care and prevention  These self-care steps can help prevent future infections:  · Drink plenty of fluids (at least 8-10 glasses of water) to prevent dehydration and flush out of the bladder. Do this unless you must restrict fluids for other health reasons, or your doctor told you not to.  · Drink cranberry juice every day. Because cranberry juice lowers the pH of the urinary tract, it discourages bacterial growth.   · Proper cleaning after going to the bathroom is important. Wipe from front to back after using the toilet to prevent the spread of bacteria.  · Urinate more often. Don't try to hold urine in for a long time.  · Wear loose-fitting clothes and cotton underwear. Avoid tight-fitting pants.  · Improve your diet and prevent constipation. Eat more fresh fruit and vegetables, and fiber, and less junk and fatty foods.  · Avoid sex until your symptoms are gone.  · Avoid caffeine, sodas, alcohol, and spicy foods. These can irritate the bladder.  · Empty your bladder immediately before and after sexual intercourse to flush out the bladder.   · Take your entire prescription of antibiotics and follow the preventive measures, even if your symptoms stop.

## 2018-01-09 NOTE — TELEPHONE ENCOUNTER
----- Message from Nazia Lezama sent at 1/8/2018  4:13 PM CST -----  Contact: Celeste  Pt calling to inform that her eyes seem to be changing that they feel tired all the time.  Not sure if she wanted to come in or not if it was not necessary.  Would like to speak to someone with regard to determine her best option. She can be reached at [phone].

## 2018-01-10 ENCOUNTER — TELEPHONE (OUTPATIENT)
Dept: OBSTETRICS AND GYNECOLOGY | Facility: CLINIC | Age: 63
End: 2018-01-10

## 2018-01-10 LAB
C TRACH DNA SPEC QL NAA+PROBE: NOT DETECTED
CANDIDA RRNA VAG QL PROBE: NEGATIVE
G VAGINALIS RRNA GENITAL QL PROBE: NEGATIVE
N GONORRHOEA DNA SPEC QL NAA+PROBE: NOT DETECTED
T VAGINALIS RRNA GENITAL QL PROBE: NEGATIVE

## 2018-01-10 NOTE — TELEPHONE ENCOUNTER
Left message   Please let pt know affirm was negative. Still waiting on culture so continue macrobid

## 2018-01-10 NOTE — TELEPHONE ENCOUNTER
Pt calling c/o fatigue.  Asking if it could be related to the Macrobid she is taking.  Advised that it is probably due to the infection.  Recommended rest and PO hydration until the urine culture is resulted.  Advised if she develops back pain or fever to go to the ER.

## 2018-01-11 ENCOUNTER — TELEPHONE (OUTPATIENT)
Dept: OBSTETRICS AND GYNECOLOGY | Facility: CLINIC | Age: 63
End: 2018-01-11

## 2018-01-11 LAB — BACTERIA UR CULT: NORMAL

## 2018-01-11 NOTE — TELEPHONE ENCOUNTER
Patient notified that UC has started to grow bacteria and to continue medication as prescribed per Maria Eugenia

## 2018-01-11 NOTE — TELEPHONE ENCOUNTER
Pt calling saw Maria Eugenia on 1-9-18 and wants to know if her cultures came back yet hasn't heard from anyone. Pt # [phone]

## 2018-01-12 NOTE — TELEPHONE ENCOUNTER
Spoke with pt who was in PCP office. She is feeling fatigued and still not feeling right. She has had similar complaints since her travel to [place] in November. Urine culture is sensitive to Macrobid and her discomfort is by her tailbone not by the upper back per pt.  Discussed with NP in the PCP office my concern that something else is going on since feelings since November travel and symptoms of visit didn't present itself as just a UTI at this time  She had US and echo that showed fluid on heart per NP. She plans on discussing with ID MD in office and possibly changing plan or meds  Pt is aware and all questions answered

## 2018-01-12 NOTE — TELEPHONE ENCOUNTER
Please let pt know: ,      Good news - the urine culture shows that the bacteria that you have is susceptible to the medicine we started when you came to clinic. No need to change antibiotics. It is important that you take the entire course of antibiotics. That means that even though you'll start to feel better before you are done, please take each and every pill. If you don't there is the potential that if you ever needed this medication in the future it might not work for you.   Let me know if you're not feeling better in the next few days or if you have any questions.   Sincerely,  YVAN Mc

## 2018-01-22 ENCOUNTER — TELEPHONE (OUTPATIENT)
Dept: OBSTETRICS AND GYNECOLOGY | Facility: CLINIC | Age: 63
End: 2018-01-22

## 2018-01-22 ENCOUNTER — HOSPITAL ENCOUNTER (OUTPATIENT)
Dept: CARDIOLOGY | Facility: CLINIC | Age: 63
Discharge: HOME | End: 2018-01-22
Payer: COMMERCIAL

## 2018-01-22 ENCOUNTER — TELEPHONE (OUTPATIENT)
Dept: CARDIOLOGY | Facility: CLINIC | Age: 63
End: 2018-01-22

## 2018-01-22 ENCOUNTER — OFFICE VISIT (OUTPATIENT)
Dept: CARDIOLOGY | Facility: CLINIC | Age: 63
End: 2018-01-22
Payer: COMMERCIAL

## 2018-01-22 VITALS
HEART RATE: 70 BPM | WEIGHT: 109.81 LBS | SYSTOLIC BLOOD PRESSURE: 101 MMHG | DIASTOLIC BLOOD PRESSURE: 67 MMHG | HEIGHT: 64 IN | BODY MASS INDEX: 18.75 KG/M2

## 2018-01-22 DIAGNOSIS — Z00.00 PHYSICAL EXAM: Primary | ICD-10-CM

## 2018-01-22 DIAGNOSIS — R19.8 CHANGE IN BOWEL FUNCTION: ICD-10-CM

## 2018-01-22 DIAGNOSIS — R53.83 FATIGUE, UNSPECIFIED TYPE: ICD-10-CM

## 2018-01-22 DIAGNOSIS — R00.2 PALPITATION: Primary | ICD-10-CM

## 2018-01-22 DIAGNOSIS — E03.9 HYPOTHYROIDISM, UNSPECIFIED TYPE: ICD-10-CM

## 2018-01-22 DIAGNOSIS — R00.2 PALPITATION: ICD-10-CM

## 2018-01-22 DIAGNOSIS — I31.39 PERICARDIAL EFFUSION: Primary | ICD-10-CM

## 2018-01-22 PROCEDURE — 93000 ELECTROCARDIOGRAM COMPLETE: CPT | Mod: S$GLB,,, | Performed by: INTERNAL MEDICINE

## 2018-01-22 PROCEDURE — 99203 OFFICE O/P NEW LOW 30 MIN: CPT | Mod: S$GLB,,, | Performed by: INTERNAL MEDICINE

## 2018-01-22 PROCEDURE — 99999 PR PBB SHADOW E&M-EST. PATIENT-LVL III: CPT | Mod: PBBFAC,,, | Performed by: INTERNAL MEDICINE

## 2018-01-22 NOTE — PROGRESS NOTES
Subjective:    Patient ID:  Celeste Mohr is a 62 y.o. female who presents for evaluation of pericardial effusion    HPI   The patient is self referred for evaluation of a pericardial effusion. Except for hypothyroidism she was is excellent health untill last Thanks Giving. She was vacationing in [place] and had acute onset of ill feeling, malaise and fatigue. While she had no nausea and vomiting or diarrhea she did have a change in her bowel habits manifest by constipiation. The general malaise and fatigue has persisted to date. While she exercised regularly before Thanks Giving she hasn't felt well enough to exercise since. She recently and chills attributed to a UTI and is currently being treated. As part of the evaluation an abdominal ultrasound was done and a pericardial was incidentally noted. An Echocardiogram was done Friday in Dr [facility] Jamil [facility] and apparently the effusion was confirmed.    EKG revealed low voltage and non specific ST - T abnormality  No results found for: NA, K, CL, CO2, BUN, CREATININE, GLU, HGBA1C, MG, AST, ALT, ALBUMIN, PROT, BILITOT, WBC, HGB, HCT, MCV, PLT, INR, PSA, TSH      No results found for: CHOL, HDL, TRIG    No results found for: LDLCALC    Past Medical History:   Diagnosis Date    Genital herpes     Hypothyroid     Lichen sclerosus     Seasonal allergies        Current Outpatient Prescriptions:     busPIRone (BUSPAR) 10 MG tablet, Take 10 mg by mouth once daily. , Disp: , Rfl: 5    fluoxetine (PROZAC) 20 MG capsule, , Disp: , Rfl:     fluticasone (FLONASE) 50 mcg/actuation nasal spray, , Disp: , Rfl:     SYNTHROID 100 mcg tablet, 125 mcg. , Disp: , Rfl:     valacyclovir (VALTREX) 500 MG tablet, Take 1 tablet (500 mg total) by mouth once daily., Disp: 30 tablet, Rfl: 6    VITAMIN D2 50,000 unit capsule, every 30 days. , Disp: , Rfl:     zolpidem (AMBIEN) 10 mg Tab, Take 10 mg by mouth nightly as needed., Disp: , Rfl: 5    zolpidem (AMBIEN) 5 MG Tab, Take 5  "mg by mouth nightly as needed., Disp: , Rfl: 0        Review of Systems   Constitution: Positive for decreased appetite and malaise/fatigue. Negative for diaphoresis, fever, weakness, weight gain and weight loss.   HENT: Negative for congestion, ear discharge, ear pain and nosebleeds.    Eyes: Negative for blurred vision, double vision and visual disturbance.   Cardiovascular: Negative for chest pain, claudication, cyanosis, dyspnea on exertion, irregular heartbeat, leg swelling, near-syncope, orthopnea, palpitations, paroxysmal nocturnal dyspnea and syncope.   Respiratory: Negative for cough, hemoptysis, shortness of breath, sleep disturbances due to breathing, snoring, sputum production and wheezing.    Endocrine: Negative for polydipsia, polyphagia and polyuria.   Hematologic/Lymphatic: Negative for adenopathy and bleeding problem. Does not bruise/bleed easily.   Skin: Negative for color change, nail changes, poor wound healing and rash.   Musculoskeletal: Negative for muscle cramps and muscle weakness.   Gastrointestinal: Positive for constipation. Negative for abdominal pain, anorexia, change in bowel habit, hematochezia, nausea and vomiting.   Genitourinary: Negative for dysuria, frequency and hematuria.   Neurological: Negative for brief paralysis, difficulty with concentration, excessive daytime sleepiness, dizziness, focal weakness, headaches, light-headedness, seizures and vertigo.   Psychiatric/Behavioral: Negative for altered mental status and depression.   Allergic/Immunologic: Negative for persistent infections.        Objective:/67 (BP Location: Left arm, Patient Position: Sitting, BP Method: Small (Automatic))   Pulse 70   Ht 5' 4" (1.626 m)   Wt 49.8 kg (109 lb 12.6 oz)   LMP 01/15/2016   BMI 18.85 kg/m²           Physical Exam   Constitutional: She is oriented to person, place, and time. She appears well-developed and well-nourished.   HENT:   Head: Normocephalic.   Right Ear: External " ear normal.   Left Ear: External ear normal.   Nose: Nose normal.   Inspection of lips, teeth and gums normal   Eyes: Conjunctivae and EOM are normal. Pupils are equal, round, and reactive to light. No scleral icterus.   Neck: Normal range of motion. No JVD present. No tracheal deviation present. No thyromegaly present.   Cardiovascular: Normal rate, regular rhythm, normal heart sounds and intact distal pulses.  Exam reveals no gallop and no friction rub.    No murmur heard.  Pulses:       Carotid pulses are 2+ on the right side, and 2+ on the left side.       Dorsalis pedis pulses are 2+ on the right side, and 2+ on the left side.        Posterior tibial pulses are 2+ on the right side, and 2+ on the left side.   Pulmonary/Chest: Effort normal and breath sounds normal. No respiratory distress. She has no wheezes. She has no rales. She exhibits no tenderness.   Abdominal: Soft. Bowel sounds are normal. She exhibits no distension. There is no hepatosplenomegaly. There is no tenderness. There is no guarding.   Musculoskeletal: Normal range of motion. She exhibits no edema or tenderness.   Lymphadenopathy:   Palpation of lymph nodes of neck and groin normal   Neurological: She is oriented to person, place, and time. No cranial nerve deficit. She exhibits normal muscle tone. Coordination normal.   Skin: Skin is warm and dry. No rash noted. No erythema. No pallor.   Palpation of skin normal   Psychiatric: She has a normal mood and affect. Her behavior is normal. Judgment and thought content normal.         Assessment:       1. Pericardial effusion    2. Hypothyroidism, unspecified type    3. Fatigue, unspecified type    4. Change in bowel function         Plan:       Celeste was seen today for palpitations.    Diagnoses and all orders for this visit:    Pericardial effusion  Echo report and recent lab from Dr Jamil's office to review  Hypothyroidism, unspecified type    Fatigue, unspecified type    Change in bowel  function

## 2018-01-22 NOTE — TELEPHONE ENCOUNTER
Wax pt, had u/s with another doctor that she thinks is an [facility] doctor and they discovered a cyst and pt wanted to let Dr Welch know about it. Pt says shes not having any problems from it but wants to know if Dr Welch needs to see her for it.

## 2018-01-22 NOTE — TELEPHONE ENCOUNTER
Pt went to Dr. Jamil at [facility] for a UTI.  She had Pelvic ultrasound for constipation and was told she has a fibrocystic.  She denies pelvic pain.  She will call Dr. Jamil's office to have them fax over the report.  Advised if she doesn't have any pain she can monitor.

## 2018-01-23 ENCOUNTER — TELEPHONE (OUTPATIENT)
Dept: CARDIOLOGY | Facility: CLINIC | Age: 63
End: 2018-01-23

## 2018-01-25 ENCOUNTER — TELEPHONE (OUTPATIENT)
Dept: CARDIOLOGY | Facility: CLINIC | Age: 63
End: 2018-01-25

## 2018-01-25 NOTE — TELEPHONE ENCOUNTER
----- Message from Latisha Esquivel sent at 1/25/2018 11:19 AM CST -----  Contact: pt  Pt was referred to Dr. Galdamez because the referring doctor told her she has a aneurysm of the heart. She would like a call from the nurse or the doctor to discuss this matter. She says her brother in law is a doctor and he told her Dr. Galdamez should have talked to her the problem.  LOV 1/22/18 Dr. Galdamez    Thanks

## 2018-02-02 ENCOUNTER — TELEPHONE (OUTPATIENT)
Dept: OBSTETRICS AND GYNECOLOGY | Facility: CLINIC | Age: 63
End: 2018-02-02

## 2018-02-02 NOTE — TELEPHONE ENCOUNTER
Wax pt calling, pt wants to know if the U/S report has been received yet. Pt said the other doctors office said they faxed it. Pt # [phone]

## 2018-02-05 ENCOUNTER — TELEPHONE (OUTPATIENT)
Dept: OBSTETRICS AND GYNECOLOGY | Facility: CLINIC | Age: 63
End: 2018-02-05

## 2018-02-05 NOTE — TELEPHONE ENCOUNTER
Call patient, I reviewed her ultrasound report.  She has a very small fibroid which is an overgrowth of uterine muscle.  It is very small the size of an inch.  I would not repeat imaging unless she was having bleeding or pelvic pain.  Her ovaries were not visualized which is normal for a menopausal woman.  They get very small during menopause.  If she is worried about the fibroid we can do ultrasound in 6mo to make sure it's stable in size.

## 2018-05-09 ENCOUNTER — OFFICE VISIT (OUTPATIENT)
Dept: OPTOMETRY | Facility: CLINIC | Age: 63
End: 2018-05-09
Payer: COMMERCIAL

## 2018-05-09 DIAGNOSIS — Z46.0 FITTING AND ADJUSTMENT OF SPECTACLES AND CONTACT LENSES: Primary | ICD-10-CM

## 2018-05-09 DIAGNOSIS — H04.123 BILATERAL DRY EYES: Primary | ICD-10-CM

## 2018-05-09 DIAGNOSIS — H52.4 HYPEROPIA WITH PRESBYOPIA, BILATERAL: ICD-10-CM

## 2018-05-09 DIAGNOSIS — H52.03 HYPEROPIA WITH PRESBYOPIA, BILATERAL: ICD-10-CM

## 2018-05-09 DIAGNOSIS — H25.13 NUCLEAR SCLEROSIS, BILATERAL: ICD-10-CM

## 2018-05-09 PROCEDURE — 92310 CONTACT LENS FITTING OU: CPT | Mod: ,,, | Performed by: OPTOMETRIST

## 2018-05-09 PROCEDURE — 99999 PR PBB SHADOW E&M-EST. PATIENT-LVL II: CPT | Mod: PBBFAC,,, | Performed by: OPTOMETRIST

## 2018-05-09 PROCEDURE — 92012 INTRM OPH EXAM EST PATIENT: CPT | Mod: S$GLB,,, | Performed by: OPTOMETRIST

## 2018-05-09 RX ORDER — LUBIPROSTONE 24 UG/1
CAPSULE, GELATIN COATED ORAL
Refills: 0 | COMMUNITY
Start: 2018-04-11 | End: 2020-12-07

## 2018-05-09 RX ORDER — FLUOXETINE 10 MG/1
10 CAPSULE ORAL DAILY
Refills: 5 | COMMUNITY
Start: 2018-04-18 | End: 2018-05-09

## 2018-05-09 RX ORDER — LEVOTHYROXINE SODIUM 125 UG/1
TABLET ORAL
Refills: 1 | COMMUNITY
Start: 2018-03-01 | End: 2018-05-09

## 2018-05-09 RX ORDER — QUETIAPINE FUMARATE 25 MG/1
25 TABLET, FILM COATED ORAL NIGHTLY
Refills: 5 | COMMUNITY
Start: 2018-04-11 | End: 2018-05-09

## 2018-05-09 RX ORDER — MELOXICAM 15 MG/1
15 TABLET ORAL DAILY
Refills: 3 | COMMUNITY
Start: 2018-04-18 | End: 2018-06-25

## 2018-05-09 RX ORDER — LORAZEPAM 0.5 MG/1
0.5 TABLET ORAL 2 TIMES DAILY
Refills: 5 | COMMUNITY
Start: 2018-04-18

## 2018-05-09 NOTE — PROGRESS NOTES
MIRNA PELAYO 07/2017  Had been seen for floaters at [facility] and given   acuvue daily comfort, but has been wearing the [employer] aqua comfort plus..   Pt would like to try acuvue daily comfort contacts, she feels the contacts   caused less near fatigue.  Glasses about 1 yr. Old, still seem fine. May   want new glasses made.    Last edited by Jenny Dueñas on 5/9/2018  2:39 PM. (History)            Assessment /Plan     For exam results, see Encounter Report.    Bilateral dry eyes    Nuclear sclerosis, bilateral    Hyperopia with presbyopia, bilateral    1. Some tired eyes at end of day, wants to try acuvue 1 day moist multifocal. Order trials.   2. Mild. Monitor condition. Patient to report any changes.   3.  Contact lens trials ordered for pt. Daily wear only advised, with education to risks of extended wear.  Discussed lens care, compliance and solutions.  RTC 2 weeks contact lens follow up.

## 2018-06-05 ENCOUNTER — TELEPHONE (OUTPATIENT)
Dept: RHEUMATOLOGY | Facility: CLINIC | Age: 63
End: 2018-06-05

## 2018-06-05 ENCOUNTER — HOSPITAL ENCOUNTER (OUTPATIENT)
Dept: RADIOLOGY | Facility: HOSPITAL | Age: 63
Discharge: HOME | End: 2018-06-05
Attending: INTERNAL MEDICINE
Payer: COMMERCIAL

## 2018-06-05 ENCOUNTER — OFFICE VISIT (OUTPATIENT)
Dept: RHEUMATOLOGY | Facility: CLINIC | Age: 63
End: 2018-06-05
Payer: COMMERCIAL

## 2018-06-05 VITALS
HEIGHT: 64 IN | DIASTOLIC BLOOD PRESSURE: 60 MMHG | HEART RATE: 70 BPM | SYSTOLIC BLOOD PRESSURE: 97 MMHG | BODY MASS INDEX: 19.94 KG/M2 | RESPIRATION RATE: 18 BRPM | WEIGHT: 116.81 LBS

## 2018-06-05 DIAGNOSIS — M06.9 RHEUMATOID ARTHRITIS INVOLVING MULTIPLE JOINTS: Primary | ICD-10-CM

## 2018-06-05 DIAGNOSIS — M25.50 POLYARTHRALGIA: Primary | ICD-10-CM

## 2018-06-05 DIAGNOSIS — R05.9 COUGH: ICD-10-CM

## 2018-06-05 DIAGNOSIS — M25.50 POLYARTHRALGIA: ICD-10-CM

## 2018-06-05 PROCEDURE — 77077 JOINT SURVEY SINGLE VIEW: CPT | Mod: TC

## 2018-06-05 PROCEDURE — 77077 JOINT SURVEY SINGLE VIEW: CPT | Mod: 26,,, | Performed by: RADIOLOGY

## 2018-06-05 PROCEDURE — 71046 X-RAY EXAM CHEST 2 VIEWS: CPT | Mod: 26,,, | Performed by: RADIOLOGY

## 2018-06-05 PROCEDURE — 3008F BODY MASS INDEX DOCD: CPT | Mod: CPTII,S$GLB,, | Performed by: INTERNAL MEDICINE

## 2018-06-05 PROCEDURE — 99999 PR PBB SHADOW E&M-EST. PATIENT-LVL III: CPT | Mod: PBBFAC,,, | Performed by: INTERNAL MEDICINE

## 2018-06-05 PROCEDURE — 99204 OFFICE O/P NEW MOD 45 MIN: CPT | Mod: S$GLB,,, | Performed by: INTERNAL MEDICINE

## 2018-06-05 PROCEDURE — 71046 X-RAY EXAM CHEST 2 VIEWS: CPT | Mod: TC

## 2018-06-05 RX ORDER — PREDNISONE 20 MG/1
TABLET ORAL
Qty: 11 TABLET | Refills: 0 | Status: SHIPPED | OUTPATIENT
Start: 2018-06-05 | End: 2018-06-25

## 2018-06-05 NOTE — PROGRESS NOTES
Subjective:       Patient ID: Celeste Mohr is a 62 y.o. female.    Chief Complaint: Disease Management    HPI     62 year old female with PMH of hypothyroidism, pericardial effusion here for evaluation. She was diagnosed with pericardial effusion January 19th.  Reports that the effusion was incidentally found on the chest xray. She reports she has pain in elbows,hands, and feet. Reports that she has been having joint pain for a couple of months. Pain level can be as high as 8/10, aching, non-radiating. Reports that her hands get swollen.  Denies fevers, hair loss, or photosensitivity. Denies raynauds, blood clot or miscarriage. Reports that she has stiffness in hands, ankles,elbows and knees.  Reports she has stiffness for 1.5 to 2 hours.   She started medrol pack with improvement in pain. Reports meloxicam did not help. Reports she has pain with opening jars.Quit smoking in 30s to 40s.  Denies chest pain or SOB.      Family hx: negative for RA  Review of Systems   Constitutional: Positive for fatigue. Negative for activity change, appetite change, chills and diaphoresis.   HENT: Negative for congestion, ear discharge, ear pain, facial swelling, mouth sores, sinus pressure, sneezing, sore throat, tinnitus and trouble swallowing.    Eyes: Negative for photophobia, pain, discharge, redness, itching and visual disturbance.   Respiratory: Negative for apnea, chest tightness, shortness of breath, wheezing and stridor.    Cardiovascular: Negative for leg swelling.   Gastrointestinal: Negative for abdominal distention, abdominal pain, anal bleeding, blood in stool, constipation, diarrhea and nausea.   Endocrine: Negative for cold intolerance and heat intolerance.   Genitourinary: Negative for difficulty urinating and dysuria.   Musculoskeletal: Positive for arthralgias and back pain. Negative for gait problem, joint swelling, myalgias, neck pain and neck stiffness.   Skin: Negative for color change, pallor, rash and  "wound.   Neurological: Negative for dizziness, seizures, light-headedness and numbness.   Hematological: Negative for adenopathy. Does not bruise/bleed easily.   Psychiatric/Behavioral: Negative for sleep disturbance. The patient is not nervous/anxious.            Objective:   BP 97/60   Pulse 70   Resp 18   Ht 5' 4" (1.626 m)   Wt 53 kg (116 lb 12.8 oz)   LMP 01/15/2016   BMI 20.05 kg/m²      Physical Exam   Constitutional: She is oriented to person, place, and time.   HENT:   Head: Normocephalic and atraumatic.   Right Ear: External ear normal.   Left Ear: External ear normal.   Nose: Nose normal.   Mouth/Throat: Oropharynx is clear and moist. No oropharyngeal exudate.   Eyes: Conjunctivae and EOM are normal. Pupils are equal, round, and reactive to light. Right eye exhibits no discharge. Left eye exhibits no discharge. No scleral icterus.   Neck: Neck supple. No JVD present. No thyromegaly present.   Cardiovascular: Normal rate, regular rhythm, normal heart sounds and intact distal pulses.  Exam reveals no gallop and no friction rub.    No murmur heard.  Pulmonary/Chest: Effort normal and breath sounds normal. No respiratory distress. She has no wheezes. She has no rales. She exhibits no tenderness.   Abdominal: Soft. Bowel sounds are normal. She exhibits no distension and no mass. There is no tenderness. There is no rebound and no guarding.   Lymphadenopathy:     She has no cervical adenopathy.   Neurological: She is alert and oriented to person, place, and time. No cranial nerve deficit. Gait normal. Coordination normal.   Skin: Skin is dry. No rash noted. No erythema. No pallor.     Psychiatric: Affect and judgment normal.   Musculoskeletal: She exhibits tenderness. She exhibits no edema or deformity.   Tenderness of elbows on palpation  Tenderness of mcps, pips  Tenderness of both ankles  Feet: tenderness of mtps         Labs: reviewed     Assessment:     62 year old female with PMH of hypothyroidism, " pericardial effusion here for evaluation.    1) Joint pain: patient with synovitis on exam despite being on medrol pack. I suspect she has an inflammatory arthritis.  Will give her course of steroids and obtain xrays.  Gave handout on MTX      2) history of smoking: will get chest xray given consideration of MTX    3) pericardial effusion: reports history of pericardial effusion. Have asked patient to let us know name of cardiologist so that we can obtain records.  Will work her up for SLE as well.  Start prednisone 20mg (Take 2 pills a day for 3 days and then one tab daily for 5 days)  rtc in 3 weeks          No diagnosis found.        **

## 2018-06-05 NOTE — TELEPHONE ENCOUNTER
Spoke to pt. And pt. Verbalized understanding//Ael   Please tell patient that I want her to take prednisone and to read about methotrexate.   Tell her we will plan on starting methotrexate if she feels good on the prednisone.

## 2018-06-08 ENCOUNTER — TELEPHONE (OUTPATIENT)
Dept: RHEUMATOLOGY | Facility: CLINIC | Age: 63
End: 2018-06-08

## 2018-06-08 NOTE — TELEPHONE ENCOUNTER
Left v/m Please call patient back and tell her all of her results are not back and to contact us next Friday if she has not heard back.      //AEL

## 2018-06-14 ENCOUNTER — TELEPHONE (OUTPATIENT)
Dept: RHEUMATOLOGY | Facility: CLINIC | Age: 63
End: 2018-06-14

## 2018-06-14 NOTE — TELEPHONE ENCOUNTER
Spoke to pt. And pt. Verbalized understanding//Ael   Please tell patient that I have released her results and I will discuss results in person. IF she wants to go over it today, then tell her I can squeezer her in for 230. Tell her there are some abnormalities but nothing of urgency so I am fine waiting until next visit.   Tell her I only call if there are critical results otherwise we talk during appointments since I am seeing patients all day.

## 2018-06-21 ENCOUNTER — OFFICE VISIT (OUTPATIENT)
Dept: RHEUMATOLOGY | Facility: CLINIC | Age: 63
End: 2018-06-21
Payer: COMMERCIAL

## 2018-06-21 ENCOUNTER — TELEPHONE (OUTPATIENT)
Dept: RHEUMATOLOGY | Facility: CLINIC | Age: 63
End: 2018-06-21

## 2018-06-21 VITALS
DIASTOLIC BLOOD PRESSURE: 63 MMHG | HEART RATE: 79 BPM | BODY MASS INDEX: 20.2 KG/M2 | HEIGHT: 64 IN | WEIGHT: 118.31 LBS | SYSTOLIC BLOOD PRESSURE: 86 MMHG

## 2018-06-21 DIAGNOSIS — S62.635D CLOSED DISPLACED FRACTURE OF DISTAL PHALANX OF LEFT RING FINGER WITH ROUTINE HEALING, SUBSEQUENT ENCOUNTER: Primary | ICD-10-CM

## 2018-06-21 DIAGNOSIS — M06.9 RHEUMATOID ARTHRITIS INVOLVING MULTIPLE JOINTS: ICD-10-CM

## 2018-06-21 PROCEDURE — 3008F BODY MASS INDEX DOCD: CPT | Mod: CPTII,S$GLB,, | Performed by: INTERNAL MEDICINE

## 2018-06-21 PROCEDURE — 99214 OFFICE O/P EST MOD 30 MIN: CPT | Mod: S$GLB,,, | Performed by: INTERNAL MEDICINE

## 2018-06-21 PROCEDURE — 99999 PR PBB SHADOW E&M-EST. PATIENT-LVL III: CPT | Mod: PBBFAC,,, | Performed by: INTERNAL MEDICINE

## 2018-06-21 RX ORDER — FOLIC ACID 1 MG/1
1 TABLET ORAL DAILY
Qty: 30 TABLET | Refills: 11 | Status: SHIPPED | OUTPATIENT
Start: 2018-06-21 | End: 2018-12-21 | Stop reason: SDUPTHER

## 2018-06-21 RX ORDER — METHOTREXATE 2.5 MG/1
10 TABLET ORAL
Qty: 32 TABLET | Refills: 0 | Status: SHIPPED | OUTPATIENT
Start: 2018-06-21 | End: 2018-08-20 | Stop reason: SDUPTHER

## 2018-06-21 RX ORDER — SERTRALINE HYDROCHLORIDE 25 MG/1
25 TABLET, FILM COATED ORAL DAILY
COMMUNITY
End: 2018-12-17

## 2018-06-21 ASSESSMENT — ROUTINE ASSESSMENT OF PATIENT INDEX DATA (RAPID3)
TOTAL RAPID3 SCORE: .17
WHEN YOU AWAKENED IN THE MORNING OVER THE LAST WEEK, PLEASE INDICATE THE AMOUNT OF TIME IT TAKES UNTIL YOU ARE AS LIMBER AS YOU WILL BE FOR THE DAY: 5 MINUTES OR LESS
MDHAQ FUNCTION SCORE: 0
PSYCHOLOGICAL DISTRESS SCORE: 0
PATIENT GLOBAL ASSESSMENT SCORE: .5
FATIGUE SCORE: 0
PAIN SCORE: 0
AM STIFFNESS SCORE: 1, YES

## 2018-06-21 NOTE — PROGRESS NOTES
Subjective:       Patient ID: Celeste Mohr is a 62 y.o. female.    Chief Complaint: Disease Management    HPI     62 year old female with PMH of hypothyroidism, pericardial effusion here for evaluation. She was diagnosed with pericardial effusion January 19th.  Reports that the effusion was incidentally found on the chest xray. She reports she has pain in elbows,hands, and feet. Reports that she has been having joint pain for a couple of months. Pain level can be as high as 8/10, aching, non-radiating. Reports that her hands get swollen.  Denies fevers, hair loss, or photosensitivity. Denies raynauds, blood clot or miscarriage. Reports that she has stiffness in hands, ankles,elbows and knees.  Reports she has stiffness for 1.5 to 2 hours.   She started medrol pack with improvement in pain. Reports meloxicam did not help. Reports she has pain with opening jars.Quit smoking in 30s to 40s.  Denies chest pain or SOB.      Interval history: Reports that with prednisone, her pain level was zero. She had no swelling or stiffness.  Her last dose of steroids was last week. Reports that for last 2 to 3 days, her pain has returned in hands and elbows.  Pain is 1/0, aching  And nonradiating,. Reports that she has more stiffness.      Family hx: negative for RA  Review of Systems   Constitutional: Positive for fatigue. Negative for activity change, appetite change, chills and diaphoresis.   HENT: Negative for congestion, ear discharge, ear pain, facial swelling, mouth sores, sinus pressure, sneezing, sore throat, tinnitus and trouble swallowing.    Eyes: Negative for photophobia, pain, discharge, redness, itching and visual disturbance.   Respiratory: Negative for apnea, chest tightness, shortness of breath, wheezing and stridor.    Cardiovascular: Negative for leg swelling.   Gastrointestinal: Negative for abdominal distention, abdominal pain, anal bleeding, blood in stool, constipation, diarrhea and nausea.   Endocrine:  "Negative for cold intolerance and heat intolerance.   Genitourinary: Negative for difficulty urinating and dysuria.   Musculoskeletal: Positive for arthralgias and back pain. Negative for gait problem, joint swelling, myalgias, neck pain and neck stiffness.   Skin: Negative for color change, pallor, rash and wound.   Neurological: Negative for dizziness, seizures, light-headedness and numbness.   Hematological: Negative for adenopathy. Does not bruise/bleed easily.   Psychiatric/Behavioral: Negative for sleep disturbance. The patient is not nervous/anxious.            Objective:   BP 97/60   Pulse 70   Resp 18   Ht 5' 4" (1.626 m)   Wt 53 kg (116 lb 12.8 oz)   LMP 01/15/2016   BMI 20.05 kg/m²      Physical Exam   Constitutional: She is oriented to person, place, and time.   HENT:   Head: Normocephalic and atraumatic.   Right Ear: External ear normal.   Left Ear: External ear normal.   Nose: Nose normal.   Mouth/Throat: Oropharynx is clear and moist. No oropharyngeal exudate.   Eyes: Conjunctivae and EOM are normal. Pupils are equal, round, and reactive to light. Right eye exhibits no discharge. Left eye exhibits no discharge. No scleral icterus.   Neck: Neck supple. No JVD present. No thyromegaly present.   Cardiovascular: Normal rate, regular rhythm, normal heart sounds and intact distal pulses.  Exam reveals no gallop and no friction rub.    No murmur heard.  Pulmonary/Chest: Effort normal and breath sounds normal. No respiratory distress. She has no wheezes. She has no rales. She exhibits no tenderness.   Abdominal: Soft. Bowel sounds are normal. She exhibits no distension and no mass. There is no tenderness. There is no rebound and no guarding.   Lymphadenopathy:     She has no cervical adenopathy.   Neurological: She is alert and oriented to person, place, and time. No cranial nerve deficit. Gait normal. Coordination normal.   Skin: Skin is dry. No rash noted. No erythema. No pallor.     Psychiatric: " Affect and judgment normal.   Musculoskeletal: She exhibits tenderness. She exhibits no edema or deformity.   Tenderness of elbows on palpation  Tenderness of mcps, pips  Tenderness of both ankles  Feet: tenderness of mtps         Labs: reviewed  Kacie-negative  Ccp,rf-negative   anticardiolipin Ig M-44  Beta-2 glycoprotein Ig M-130  LAC-negative       Arthritis survey (2018): I personally reviewed;I suspect acute or subacute nondisplaced fracture at the ulnar aspect of the proximal portion of the distal phalanx of the left ring finger extending to the articular surfac    Assessment:     62 year old female with PMH of hypothyroidism, pericardial effusion here for evaluation.  She presented with a bilateral symmetric arthritis consistent with seronegative RA.   1) RA: symptoms almost resolved on prednisone and returning with weaning off prednisone.Discussed various treatment options with patient.  Start MTX 4 pills a week (Risks and benefits of initiating MTX discussed. Patient aware that risks include and not limited to lung toxicity, liver abnormalities, cell count abnormalities, malignancy, and allergic  reaction to medication. Patient agrees with starting medication.  Start folic acid 1mg po qday  Labs in a month    2) pericardial effusion: reports history of pericardial effusion.  Work up negative.    3) left ring finger fracture: per my review, it appears that patient has a fracture.  Hand clinic referral    4)abnormal APS antibodies: denies history of  clotting or miscarriage.  Will repeat APS in 3 months. If positive, will send to heme as to whether she needs to be on low dose aspirin.  Can consider plaquenil given anti-thrombotic effects.    5) discuss dexa scan history at next visit

## 2018-06-22 ENCOUNTER — TELEPHONE (OUTPATIENT)
Dept: ORTHOPEDICS | Facility: CLINIC | Age: 63
End: 2018-06-22

## 2018-06-22 DIAGNOSIS — M79.642 LEFT HAND PAIN: Primary | ICD-10-CM

## 2018-06-22 NOTE — TELEPHONE ENCOUNTER
Spoke w/pt in regards to message below. Appt scheduled for pt to see Cheryl SARMIENTO on Mon 6/25/18 with xray before OV. Pt verbalized understanding to date/times/locations/provider.

## 2018-06-22 NOTE — TELEPHONE ENCOUNTER
----- Message from Nighat Barboza LPN sent at 6/21/2018 10:24 AM CDT -----  Per Dr. Strange, please set her up with Dr. MORALES.    Thanks, Nighat

## 2018-06-25 ENCOUNTER — HOSPITAL ENCOUNTER (OUTPATIENT)
Dept: RADIOLOGY | Facility: OTHER | Age: 63
Discharge: HOME | End: 2018-06-25
Attending: ORTHOPAEDIC SURGERY
Payer: COMMERCIAL

## 2018-06-25 ENCOUNTER — CLINICAL SUPPORT (OUTPATIENT)
Dept: REHABILITATION | Facility: HOSPITAL | Age: 63
End: 2018-06-25
Attending: ORTHOPAEDIC SURGERY
Payer: COMMERCIAL

## 2018-06-25 ENCOUNTER — OFFICE VISIT (OUTPATIENT)
Dept: ORTHOPEDICS | Facility: CLINIC | Age: 63
End: 2018-06-25
Payer: COMMERCIAL

## 2018-06-25 ENCOUNTER — HOSPITAL ENCOUNTER (OUTPATIENT)
Dept: RADIOLOGY | Facility: OTHER | Age: 63
Discharge: HOME | End: 2018-06-25
Attending: PHYSICIAN ASSISTANT
Payer: COMMERCIAL

## 2018-06-25 VITALS
BODY MASS INDEX: 20.21 KG/M2 | HEIGHT: 64 IN | WEIGHT: 118.38 LBS | DIASTOLIC BLOOD PRESSURE: 63 MMHG | HEART RATE: 86 BPM | SYSTOLIC BLOOD PRESSURE: 91 MMHG

## 2018-06-25 DIAGNOSIS — M77.12 LATERAL EPICONDYLITIS OF LEFT ELBOW: ICD-10-CM

## 2018-06-25 DIAGNOSIS — M79.644 FINGER PAIN, RIGHT: ICD-10-CM

## 2018-06-25 DIAGNOSIS — G89.29 CHRONIC LEFT SHOULDER PAIN: Primary | ICD-10-CM

## 2018-06-25 DIAGNOSIS — M25.521 RIGHT ELBOW PAIN: ICD-10-CM

## 2018-06-25 DIAGNOSIS — M25.521 RIGHT ELBOW PAIN: Primary | ICD-10-CM

## 2018-06-25 DIAGNOSIS — M79.642 LEFT HAND PAIN: ICD-10-CM

## 2018-06-25 DIAGNOSIS — G89.29 CHRONIC LEFT SHOULDER PAIN: ICD-10-CM

## 2018-06-25 DIAGNOSIS — M25.512 CHRONIC LEFT SHOULDER PAIN: ICD-10-CM

## 2018-06-25 DIAGNOSIS — S62.665A CLOSED NONDISPLACED FRACTURE OF DISTAL PHALANX OF LEFT RING FINGER, INITIAL ENCOUNTER: ICD-10-CM

## 2018-06-25 DIAGNOSIS — M25.512 CHRONIC LEFT SHOULDER PAIN: Primary | ICD-10-CM

## 2018-06-25 PROCEDURE — 73030 X-RAY EXAM OF SHOULDER: CPT | Mod: 26,LT,, | Performed by: RADIOLOGY

## 2018-06-25 PROCEDURE — 73080 X-RAY EXAM OF ELBOW: CPT | Mod: TC,FY,LT

## 2018-06-25 PROCEDURE — 73030 X-RAY EXAM OF SHOULDER: CPT | Mod: TC,FY,LT

## 2018-06-25 PROCEDURE — 73080 X-RAY EXAM OF ELBOW: CPT | Mod: TC,FY,RT

## 2018-06-25 PROCEDURE — 73080 X-RAY EXAM OF ELBOW: CPT | Mod: 26,RT,, | Performed by: RADIOLOGY

## 2018-06-25 PROCEDURE — 73080 X-RAY EXAM OF ELBOW: CPT | Mod: 26,LT,, | Performed by: RADIOLOGY

## 2018-06-25 PROCEDURE — 3008F BODY MASS INDEX DOCD: CPT | Mod: CPTII,S$GLB,, | Performed by: PHYSICIAN ASSISTANT

## 2018-06-25 PROCEDURE — 99999 PR PBB SHADOW E&M-EST. PATIENT-LVL IV: CPT | Mod: PBBFAC,,, | Performed by: PHYSICIAN ASSISTANT

## 2018-06-25 PROCEDURE — 99204 OFFICE O/P NEW MOD 45 MIN: CPT | Mod: S$GLB,,, | Performed by: PHYSICIAN ASSISTANT

## 2018-06-25 PROCEDURE — 73130 X-RAY EXAM OF HAND: CPT | Mod: TC,FY,LT

## 2018-06-25 PROCEDURE — L3933 FO W/O JOINTS CF: HCPCS

## 2018-06-25 PROCEDURE — 73130 X-RAY EXAM OF HAND: CPT | Mod: 26,LT,, | Performed by: RADIOLOGY

## 2018-06-25 NOTE — PLAN OF CARE
[facility] Occupational Therapy  Orthosis Note/Certificate of Medical Necessity      Date: 6/25/2018  Name: Celeste Mohr  Clinic Number: [id number]    Medical Diagnosis: Left RF P3 fracture  Therapy Diagnosis:   Encounter Diagnosis   Name Primary?    Finger pain, right      Precautions: Standard    Physician: Cheryl Crain PA-C  Physician Orders: custom finger splint  Date of Order: 6/25/2018    Time In: 12:25PM  Time Out: 12:50PM  HCPCS Level II Code and Description:  FO - Custom finger gutter orthosis     Subjective     Involved Side: Right  Date of Onset: 3 weeks ago   Surgical Procedure: N/A  History of Current Condition: Patient saw PA in office today and received order for custom orthosis.     Past Medical History/Physical Systems Review:   Past Medical History:   Diagnosis Date    Genital herpes     Hypothyroid     Lichen sclerosus     Seasonal allergies      Celeste Mohr  has a past surgical history that includes breast reduction and Facial cosmetic surgery.    Celeste has a current medication list which includes the following prescription(s): amitiza, fluticasone, folic acid, lorazepam, methotrexate, sertraline, synthroid, valacyclovir, vitamin d2, and zolpidem.    Review of patient's allergies indicates:  No Known Allergies     Objective     Custom Fabricated Orthosis  The custom orthosis was fabricated as requested using low-temperature plastic material. A pattern was measured then cut and custom molded to accommodate this individual patient.   () Long Arm Orthosis  () Forearm-Based Orthosis  () Radial-Based Orthosis  () Ulnar-Based Orthosis  () Hand-Based Orthosis  (x) Finger-Based Orthosis  () Full Circumference  (x) Half Circumference  (x) Static  () Static Progressive  () Dynamic    Purpose of Orthosis  (x) Protect Recent Injury   () Protect Surgical Procedure  (x) Maintain Joint Positioning  () Increase Motion    HEP / Patient Instructions      See patient  instructions section for orthosis instructions.  Patient was instructed verbally, signed, and provided with written proof of delivery as well as information regarding wear schedule, care, and precautions of orthosis.    Assessment      The orthosis appeared to fit well with no problems noted. There were no complaints of discomfort from the patient at this time. The patient demonstrated competency with independently doffing and donning orthosis.    Plan     Frequency and Duration: 1x visit for orthosis fabrication  Orthosis to be worn at all times with removal for hand hygiene and light ROM.  Orthosis checks PRN.    I certify that the orthosis was performed or under the direct supervision of a qualified Occupational Therapist.   Benita Joshi, WOODY, TAMEKA, VANESSA       I certify that I have examined the above named patient and that the orthosis(s) are medically necessary.  Date:       Physician:  NEREYDA#:

## 2018-06-25 NOTE — PROGRESS NOTES
Subjective:      Patient ID: Celeste Mohr is a 62 y.o. female.    Chief Complaint: Pain and Injury of the Left Hand      HPI  Celeste Mohr is a Hand dominant 62 y.o. female presenting today for left fourth distal phalanx fracture. Fracture was noted on a arthritis survey done on 6/5/18. Pt recalls jamming the finger about 2 mos ago. Pt did not splint or see anyone for this. She continues to have some pain in the finger.     Pt states actually however, her left shoulder is most bothersome currently. Onset 3-4 months ago. Denies injury. She has difficulty and pain with ROM, especially internal rotation. 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 In addition, she notes bl elbow pain x several months. She was told in the past she had tennis elbow and completed a short course of therapy, has not tried ellyn straps. We do not have xrays of the shoulder or elbows.    Review of patient's allergies indicates:  No Known Allergies      Current Outpatient Prescriptions   Medication Sig Dispense Refill    AMITIZA 24 mcg Cap TAKE ONE CAPSULE BY MOUTH once OR twice day  0    fluticasone (FLONASE) 50 mcg/actuation nasal spray       folic acid (FOLVITE) 1 MG tablet Take 1 tablet (1 mg total) by mouth once daily. 30 tablet 11    LORazepam (ATIVAN) 0.5 MG tablet Take 0.5 mg by mouth 2 (two) times daily.  5    methotrexate 2.5 MG Tab Take 4 tablets (10 mg total) by mouth every 7 days. 32 tablet 0    sertraline (ZOLOFT) 25 MG tablet Take 25 mg by mouth once daily.      SYNTHROID 100 mcg tablet 125 mcg.       valacyclovir (VALTREX) 500 MG tablet Take 1 tablet (500 mg total) by mouth once daily. 30 tablet 6    VITAMIN D2 50,000 unit capsule every 30 days.       zolpidem (AMBIEN) 5 MG Tab Take 5 mg by mouth nightly as needed.  0     No current facility-administered medications for this visit.        Past Medical History:   Diagnosis Date    Genital herpes     Hypothyroid      "Lichen sclerosus     Seasonal allergies        Past Surgical History:   Procedure Laterality Date    breast reduction      FACIAL COSMETIC SURGERY         Review of Systems:  Constitutional: Negative for chills and fever.   Respiratory: Negative for cough and shortness of breath.    Gastrointestinal: Negative for nausea and vomiting.   Skin: Negative for rash.   Neurological: Negative for dizziness and headaches.   Psychiatric/Behavioral: Negative for depression.   MSK as in HPI       OBJECTIVE:     PHYSICAL EXAM:  BP 91/63   Pulse 86   Ht 5' 4" (1.626 m)   Wt 53.7 kg (118 lb 6.2 oz)   LMP 01/15/2016   BMI 20.32 kg/m²     GEN:  NAD, well-developed, well-groomed.  NEURO: Awake, alert, and oriented. Normal attention and concentration.    PSYCH: Normal mood and affect. Behavior is normal.  HEENT: No cervical lymphadenopathy noted.  CARDIOVASCULAR: Radial pulses 2+ bilaterally. No LE edema noted.  PULMONARY: Breath sounds normal. No respiratory distress.  SKIN: Intact, no rashes.      MSK:   LUE:  Good active ROM of the elbow, wrist, and fingers. She has decreased shoulder IR with pain. Non-ttp over the shoulder. ttp over the lateral epicondyle. AIN/PIN/Radial/Median/Ulnar Nerves assessed in isolation without deficit. Radial & Ulnar arteries palpated 2+. Capillary Refill <3s.    RUE:  Good active ROM of the elbow, wrist, and fingers. ttp lateral epicondyle. AIN/PIN/Radial/Median/Ulnar Nerves assessed in isolation without deficit. Radial & Ulnar arteries palpated 2+. Capillary Refill <3s.      RADIOGRAPHS:  Xray left hand 6/25/18  FINDINGS:  Once again, there is a lucency involving the base of the distal phalanx of the left 4th finger with the lucency extending to the articular surface.  No significant interval change is noted when compared to the prior examination.  This may represent a healed or healing fracture versus normal variant/nutrient canals.  The remainder of the bones appear intact.  There is no " evidence for dislocation.  No radiopaque soft tissue foreign bodies are identified.   Impression   Stable linear lucency involving the base of the distal phalanx of the 4th finger extending towards the articular surface.  This could represent a healed or healing fracture versus normal variant/nutrient canals.     Comments: I have personally reviewed the imaging and I agree with the above radiologist's report.    ASSESSMENT/PLAN:       ICD-10-CM ICD-9-CM   1. Chronic left shoulder pain M25.512 719.41    G89.29 338.29   2. Lateral epicondylitis of left elbow M77.12 726.32   3. Closed nondisplaced fracture of distal phalanx of left ring finger, initial encounter S62.665A 816.02       Orders Placed This Encounter    X-Ray Shoulder Trauma 3 view Left    X-Ray Elbow Complete 3 view Left    X-Ray Finger 2 View or More Views    Ambulatory Referral to Physical/Occupational Therapy    Ambulatory Referral to Physical/Occupational Therapy      Plan:   -xrays elbow and shoulder   -finger splint- made by OT  -OT to address L ring, L shoulder, bl elbows   -recommend ellyn straps  -RTC 3 wks with finger xray         The patient indicates understanding of these issues and agrees to the plan.    Cheryl Crain PA-C  [facility]   [facility]

## 2018-06-27 ENCOUNTER — TELEPHONE (OUTPATIENT)
Dept: ORTHOPEDICS | Facility: CLINIC | Age: 63
End: 2018-06-27

## 2018-06-27 NOTE — TELEPHONE ENCOUNTER
----- Message from Cheryl Crain PA-C sent at 6/26/2018  8:08 AM CDT -----  Hey   Can you schedule her for a 3 wk f/u with finger xrays- I've ordered them?  Thanks  Cheryl

## 2018-07-09 ENCOUNTER — TELEPHONE (OUTPATIENT)
Dept: ORTHOPEDICS | Facility: CLINIC | Age: 63
End: 2018-07-09

## 2018-07-09 NOTE — TELEPHONE ENCOUNTER
Spoke with pt , phone number to therapy given to schedule therapy [phone]. Pt verbalized understanding.

## 2018-07-09 NOTE — TELEPHONE ENCOUNTER
----- Message from Nighat Barboza LPN sent at 7/9/2018  9:05 AM CDT -----  Contact: patient  Hi,   I think this is for you.      Thanks, Nighat  ----- Message -----  From: Yanira Ruiz  Sent: 7/9/2018   8:55 AM  To: Alexandr Osman Staff    Please call pt at [phone]. Patient has questions about getting a future physical therapy appt and also how to get another finger support?    Thank you

## 2018-07-11 ENCOUNTER — TELEPHONE (OUTPATIENT)
Dept: RHEUMATOLOGY | Facility: CLINIC | Age: 63
End: 2018-07-11

## 2018-07-11 DIAGNOSIS — M79.642 BILATERAL HAND PAIN: Primary | ICD-10-CM

## 2018-07-11 DIAGNOSIS — M79.641 BILATERAL HAND PAIN: Primary | ICD-10-CM

## 2018-07-11 NOTE — TELEPHONE ENCOUNTER
----- Message from Yanira Ruiz sent at 7/11/2018  1:26 PM CDT -----  Contact: patient  Please call pt at [phone]. Patient stated that her Methotrexate 2.5 mg is not helping. She is still in severe pain. Also would like to discuss physical therapy location options    Thank you

## 2018-07-13 DIAGNOSIS — M25.511 BILATERAL SHOULDER PAIN, UNSPECIFIED CHRONICITY: Primary | ICD-10-CM

## 2018-07-13 DIAGNOSIS — M25.512 BILATERAL SHOULDER PAIN, UNSPECIFIED CHRONICITY: Primary | ICD-10-CM

## 2018-07-17 ENCOUNTER — OFFICE VISIT (OUTPATIENT)
Dept: ORTHOPEDICS | Facility: CLINIC | Age: 63
End: 2018-07-17
Payer: COMMERCIAL

## 2018-07-17 ENCOUNTER — HOSPITAL ENCOUNTER (OUTPATIENT)
Dept: RADIOLOGY | Facility: OTHER | Age: 63
Discharge: HOME | End: 2018-07-17
Attending: PHYSICIAN ASSISTANT
Payer: COMMERCIAL

## 2018-07-17 VITALS
SYSTOLIC BLOOD PRESSURE: 103 MMHG | DIASTOLIC BLOOD PRESSURE: 67 MMHG | HEIGHT: 64 IN | WEIGHT: 118 LBS | BODY MASS INDEX: 20.14 KG/M2 | HEART RATE: 67 BPM

## 2018-07-17 DIAGNOSIS — S62.665A CLOSED NONDISPLACED FRACTURE OF DISTAL PHALANX OF LEFT RING FINGER, INITIAL ENCOUNTER: Primary | ICD-10-CM

## 2018-07-17 DIAGNOSIS — S62.665A CLOSED NONDISPLACED FRACTURE OF DISTAL PHALANX OF LEFT RING FINGER, INITIAL ENCOUNTER: ICD-10-CM

## 2018-07-17 PROCEDURE — 73140 X-RAY EXAM OF FINGER(S): CPT | Mod: 26,LT,, | Performed by: RADIOLOGY

## 2018-07-17 PROCEDURE — 99999 PR PBB SHADOW E&M-EST. PATIENT-LVL IV: CPT | Mod: PBBFAC,,, | Performed by: PHYSICIAN ASSISTANT

## 2018-07-17 PROCEDURE — 73140 X-RAY EXAM OF FINGER(S): CPT | Mod: TC,FY

## 2018-07-17 PROCEDURE — 99213 OFFICE O/P EST LOW 20 MIN: CPT | Mod: S$GLB,,, | Performed by: PHYSICIAN ASSISTANT

## 2018-07-17 PROCEDURE — 3008F BODY MASS INDEX DOCD: CPT | Mod: CPTII,S$GLB,, | Performed by: PHYSICIAN ASSISTANT

## 2018-07-17 NOTE — PROGRESS NOTES
"Ms. Mohr is here today for a follow up visit.  She is status post left fourth distal phalanx fracture. Fracture was noted on a arthritis survey done on 6/5/18. Patient recalls jamming the finger about 3 mos ago.  At her last visit she was provided with a custom finger splint.  She has been wearing a splint, however she may not be wearing it exactly as directed.   She has a history of rheumatoid arthritis.    ROS:  Constitutional: no fever or chills  Skin: no rash or suspicious lesions  Musculoskeletal: See HPI.   Neurological: no headaches, lightheadedness, or dizziness.   Psychological/behavioral: no anxiety or depression    Physical exam:    Vitals:    07/17/18 1313   BP: 103/67   Pulse: 67   Weight: 53.5 kg (118 lb)   Height: 5' 4" (1.626 m)   PainSc:   4   PainLoc: Hand     Vital signs are stable, patient is afebrile.  Patient is well dressed and well groomed, no acute distress.  Alert and oriented to person, place, and time.  Musculoskeletal:  No significant edema noted. No lacerations.  No ecchymosis.  She is tender to palpation left ring finger distally, over the distal phalanx and the DIP.  Fair to good finger range of motion. Neurovascularly intact-good sensation and motor function, good capillary refill, 2+ radial pulses.       RADIOLOGY:  Left Ring Finger X-Ray, 7/17/18  FINDINGS:  Unchanged lucency through the base of the 4th distal phalanx.  No significant interval sclerosis.   Impression   Unchanged lucency through the base of the 4th distal phalanx.  There has been no interval sclerosis, suggesting that this may represent a nutrient foramen as opposed to a nondisplaced fracture     Comments: I have personally reviewed the imaging and I agree with the above radiologist's report.      Assessment:  Left ring finger distal phalanx lucency-possible fracture    Plan:  Celeste was seen today for pain.    Diagnoses and all orders for this visit:    Closed nondisplaced fracture of distal phalanx of left ring " finger, initial encounter  -     DXA Bone Density Spine And Hip; Future  -     X-Ray Finger 2 View or More Views; Future        - DEXA scan ordered  - discussed use of supplemental calcium plus vitamin-D  - continue use of splint  - occupational therapy  - follow-up in 3 weeks with x-ray  - call with questions or concerns  - no lifting or weight-bearing left hand

## 2018-07-20 ENCOUNTER — HOSPITAL ENCOUNTER (OUTPATIENT)
Dept: RADIOLOGY | Facility: OTHER | Age: 63
Discharge: HOME | End: 2018-07-20
Attending: PHYSICIAN ASSISTANT
Payer: COMMERCIAL

## 2018-07-20 DIAGNOSIS — S62.665A CLOSED NONDISPLACED FRACTURE OF DISTAL PHALANX OF LEFT RING FINGER, INITIAL ENCOUNTER: ICD-10-CM

## 2018-07-20 PROCEDURE — 77080 DXA BONE DENSITY AXIAL: CPT | Mod: 26,,, | Performed by: RADIOLOGY

## 2018-07-20 PROCEDURE — 77080 DXA BONE DENSITY AXIAL: CPT | Mod: TC

## 2018-07-23 ENCOUNTER — TELEPHONE (OUTPATIENT)
Dept: ORTHOPEDICS | Facility: CLINIC | Age: 63
End: 2018-07-23

## 2018-07-23 ENCOUNTER — TELEPHONE (OUTPATIENT)
Dept: RHEUMATOLOGY | Facility: CLINIC | Age: 63
End: 2018-07-23

## 2018-07-23 NOTE — TELEPHONE ENCOUNTER
----- Message from Tesfaye Smith sent at 7/23/2018  8:53 AM CDT -----  Contact: Self @ [phone]  Pt would like a call back @ [phone] in regards to scheduling a appointment sometime this week before Thursday pt states she cannot make 7/31 appointment

## 2018-07-24 NOTE — TELEPHONE ENCOUNTER
Spoke to patient. Confirmed that Dr. FAIRBANKS is out but that I will email her about fitting her in at a later date, past the 31st. Patient will be traveling and asked to cancel her appt 7/31. I verbalized udnerstanding. Pt is waiting to hear from staff re possible appt in beginning of Aug.

## 2018-07-25 DIAGNOSIS — A60.00 GENITAL HERPES SIMPLEX, UNSPECIFIED SITE: ICD-10-CM

## 2018-07-25 RX ORDER — VALACYCLOVIR HYDROCHLORIDE 500 MG/1
500 TABLET, FILM COATED ORAL DAILY
Qty: 30 TABLET | Refills: 11 | Status: SHIPPED | OUTPATIENT
Start: 2018-07-25 | End: 2018-12-17 | Stop reason: SDUPTHER

## 2018-08-07 ENCOUNTER — HOSPITAL ENCOUNTER (OUTPATIENT)
Dept: RADIOLOGY | Facility: OTHER | Age: 63
Discharge: HOME | End: 2018-08-07
Attending: PHYSICIAN ASSISTANT
Payer: COMMERCIAL

## 2018-08-07 ENCOUNTER — OFFICE VISIT (OUTPATIENT)
Dept: ORTHOPEDICS | Facility: CLINIC | Age: 63
End: 2018-08-07
Payer: COMMERCIAL

## 2018-08-07 VITALS
HEIGHT: 64 IN | RESPIRATION RATE: 18 BRPM | BODY MASS INDEX: 20.14 KG/M2 | HEART RATE: 81 BPM | SYSTOLIC BLOOD PRESSURE: 88 MMHG | WEIGHT: 118 LBS | DIASTOLIC BLOOD PRESSURE: 57 MMHG

## 2018-08-07 DIAGNOSIS — S62.665A CLOSED NONDISPLACED FRACTURE OF DISTAL PHALANX OF LEFT RING FINGER, INITIAL ENCOUNTER: Primary | ICD-10-CM

## 2018-08-07 DIAGNOSIS — S62.665A CLOSED NONDISPLACED FRACTURE OF DISTAL PHALANX OF LEFT RING FINGER, INITIAL ENCOUNTER: ICD-10-CM

## 2018-08-07 PROCEDURE — 73140 X-RAY EXAM OF FINGER(S): CPT | Mod: TC,FY

## 2018-08-07 PROCEDURE — 99213 OFFICE O/P EST LOW 20 MIN: CPT | Mod: S$GLB,,, | Performed by: PHYSICIAN ASSISTANT

## 2018-08-07 PROCEDURE — 99999 PR PBB SHADOW E&M-EST. PATIENT-LVL IV: CPT | Mod: PBBFAC,,, | Performed by: PHYSICIAN ASSISTANT

## 2018-08-07 PROCEDURE — 3008F BODY MASS INDEX DOCD: CPT | Mod: CPTII,S$GLB,, | Performed by: PHYSICIAN ASSISTANT

## 2018-08-07 PROCEDURE — 73140 X-RAY EXAM OF FINGER(S): CPT | Mod: 26,LT,, | Performed by: RADIOLOGY

## 2018-08-07 NOTE — PROGRESS NOTES
"Ms. Mohr is here today for a follow up visit.  She is status post left fourth distal phalanx fracture. Fracture was noted on a arthritis survey done on 6/5/18. Patient recalls jamming the finger about 3-4 mos ago.  She was wearing her custom finger splint, but has misplaced it. She reports finger stiffness, but no pain.  She has a history of rheumatoid arthritis.    ROS:  Constitutional: no fever or chills  Skin: no rash or suspicious lesions  Musculoskeletal: See HPI.   Neurological: no headaches, lightheadedness, or dizziness.   Psychological/behavioral: no anxiety or depression    Physical exam:    Vitals:    08/07/18 1435   BP: (!) 88/57   Pulse: 81   Resp: 18   Weight: 53.5 kg (118 lb)   Height: 5' 4" (1.626 m)   PainSc: 0-No pain   PainLoc: Hand     Vital signs are stable, patient is afebrile.  Patient is well dressed and well groomed, no acute distress.  Alert and oriented to person, place, and time.  Musculoskeletal:  No significant edema noted. No lacerations.  No ecchymosis.  She is tender to palpation left ring finger distally, over the distal phalanx and the DIP.  Fair to good finger range of motion. Neurovascularly intact-good sensation and motor function, good capillary refill, 2+ radial pulses.       RADIOLOGY:  Left Ring Finger X-Ray, 8/7/18  FINDINGS:  The alignment is within normal limits.  The lucency at the base of the 4th distal phalanx is less distinct on today's exam.  No acute fracture identified.    No marrow replacement process.      Impression     No significant changes.     Comments: I have personally reviewed the imaging and I agree with the above radiologist's report.      Assessment:  Left ring finger distal phalanx lucency-possible fracture    Plan:  Celeste was seen today for pain.    Diagnoses and all orders for this visit:    Closed nondisplaced fracture of distal phalanx of left ring finger, initial encounter  -     Ambulatory Referral to Physical/Occupational Therapy  -     " X-Ray Finger 2 View or More Views; Future        - Continue supplemental calcium plus vitamin-D  - continue use of splint (medardo provided) for nighttime and activity  - occupational therapy - orders placed  - follow-up in 4 weeks with x-ray  - call with questions or concerns  - no lifting or weight-bearing left hand

## 2018-08-14 ENCOUNTER — CLINICAL SUPPORT (OUTPATIENT)
Dept: REHABILITATION | Facility: HOSPITAL | Age: 63
End: 2018-08-14
Attending: PHYSICIAN ASSISTANT
Payer: COMMERCIAL

## 2018-08-14 DIAGNOSIS — M25.60 STIFFNESS DUE TO IMMOBILITY: ICD-10-CM

## 2018-08-14 DIAGNOSIS — M19.90 INFLAMMATORY ARTHRITIS: Primary | ICD-10-CM

## 2018-08-14 DIAGNOSIS — Z74.09 STIFFNESS DUE TO IMMOBILITY: ICD-10-CM

## 2018-08-14 PROCEDURE — 97110 THERAPEUTIC EXERCISES: CPT

## 2018-08-14 PROCEDURE — 97165 OT EVAL LOW COMPLEX 30 MIN: CPT

## 2018-08-14 RX ORDER — METHOTREXATE 2.5 MG/1
10 TABLET ORAL
Qty: 32 TABLET | Refills: 0 | OUTPATIENT
Start: 2018-08-14 | End: 2019-08-14

## 2018-08-14 NOTE — TELEPHONE ENCOUNTER
----- Message from Jacqui Strange MD sent at 8/14/2018 11:01 AM CDT -----  Please let patient know she is due for blood work for methotrexate monitoring.  Tell her to request the methotrexate after she does the blood work to make sure it is not affecting her blood counts.    Dr. Strange

## 2018-08-14 NOTE — PATIENT INSTRUCTIONS
Finger and Thumb Extension (Resistive Putty)        With thumb and all fingers in center of putty donut, stretch out.  Repeat ____ times. Do ____ sessions per day.    Copyright © [facility]. All rights reserved.   Finger Flexors        Keeping right fingertips straight, press putty toward base of palm.  Repeat ____ times. Do ____ sessions per day.  Activity: Squeeze flour sifter, plastic squeeze bottles, turkey baster, juice from fruit.*    Copyright © I. All rights reserved.   Extension (Assistive Putty)        Roll putty back and forth, being sure to use all fingertips.  Repeat ____ times. Do ____ sessions per day.    Copyright © I. All rights reserved.   Co-contraction (Resistive Putty)        Push slowly into putty with ______ finger held straight. Repeat ____ times. Do same with ______ finger(s).  Do ____ sessions per day.    Copyright © [facility]. All rights reserved.   Adduction (Resistive Putty)        Press between 2 fingers and pull putty anchored with other hand. Repeat with all fingers.  Repeat ____ times. Do ____ sessions per day.    Copyright © [facility]. All rights reserved.   Three Jaw Carlito Pinch Strengthening (Resistive Putty)        Pull putty, using thumb, index and middle fingers.  Repeat ____ times. Do ____ sessions per day.    Copyright © [facility]. All rights reserved.   DIP Flexion (Passive)        Use other hand to gently bend tip joint of ______ finger. Hold ____ seconds.  Repeat ____ times. Do ____ sessions per day.    Copyright © I. All rights reserved.   PROM: Finger MP Joints        Passively bend ________ finger(s) of right hand at first row of knuckles until stretch is felt. Hold ____ seconds. Relax. Straighten finger as far as possible.  Repeat ____ times per set. Do ____ sets per session. Do ____ sessions per day.    Copyright © [facility]. All rights reserved.   PIP / DIP Composite Flexion (Passive Stretch)        Use other hand to bend middle and tip joints of ______ finger. Hold ____ seconds.  Repeat  ____ times. Do ____ sessions per day.    Copyright © [employer]. All rights reserved.   Flexion: ROM        Position (A) [place]: Stabilize left arm near wrist. Motion (B) -Fold fingers into fist.  Repeat ___ times. Repeat with other hand. Do ___ sessions per day. Variation: Cue patient to make fist with other hand at same time. Perform with wrist bent back to ___°. Patient is passive.    Copyright © [employer]. All rights reserved.

## 2018-08-15 ENCOUNTER — TELEPHONE (OUTPATIENT)
Dept: RHEUMATOLOGY | Facility: CLINIC | Age: 63
End: 2018-08-15

## 2018-08-15 NOTE — TELEPHONE ENCOUNTER
----- Message from Dacia Calderón sent at 8/15/2018 12:07 PM CDT -----  Contact: Self  Patient returning Tricia's call. Also requesting to be squeezed in before her current appt scheduled for 9/10.    [phone]

## 2018-08-16 ENCOUNTER — LAB VISIT (OUTPATIENT)
Dept: LAB | Facility: HOSPITAL | Age: 63
End: 2018-08-16
Attending: INTERNAL MEDICINE
Payer: COMMERCIAL

## 2018-08-16 DIAGNOSIS — M19.90 INFLAMMATORY ARTHRITIS: ICD-10-CM

## 2018-08-16 LAB
ALBUMIN SERPL BCP-MCNC: 3.9 G/DL
ALP SERPL-CCNC: 76 U/L
ALT SERPL W/O P-5'-P-CCNC: 18 U/L
ANION GAP SERPL CALC-SCNC: 6 MMOL/L
AST SERPL-CCNC: 19 U/L
BASOPHILS # BLD AUTO: 0.03 K/UL
BASOPHILS NFR BLD: 0.7 %
BILIRUB SERPL-MCNC: 0.4 MG/DL
BUN SERPL-MCNC: 19 MG/DL
CALCIUM SERPL-MCNC: 9.4 MG/DL
CHLORIDE SERPL-SCNC: 109 MMOL/L
CO2 SERPL-SCNC: 26 MMOL/L
CREAT SERPL-MCNC: 0.8 MG/DL
CRP SERPL-MCNC: 0.6 MG/L
DIFFERENTIAL METHOD: NORMAL
EOSINOPHIL # BLD AUTO: 0.2 K/UL
EOSINOPHIL NFR BLD: 3.9 %
ERYTHROCYTE [DISTWIDTH] IN BLOOD BY AUTOMATED COUNT: 12.7 %
ERYTHROCYTE [SEDIMENTATION RATE] IN BLOOD BY WESTERGREN METHOD: <2 MM/HR
EST. GFR  (AFRICAN AMERICAN): >60 ML/MIN/1.73 M^2
EST. GFR  (NON AFRICAN AMERICAN): >60 ML/MIN/1.73 M^2
GLUCOSE SERPL-MCNC: 98 MG/DL
HCT VFR BLD AUTO: 37.3 %
HGB BLD-MCNC: 12.2 G/DL
LYMPHOCYTES # BLD AUTO: 1.4 K/UL
LYMPHOCYTES NFR BLD: 31.4 %
MCH RBC QN AUTO: 29.9 PG
MCHC RBC AUTO-ENTMCNC: 32.7 G/DL
MCV RBC AUTO: 91 FL
MONOCYTES # BLD AUTO: 0.5 K/UL
MONOCYTES NFR BLD: 10.3 %
NEUTROPHILS # BLD AUTO: 2.5 K/UL
NEUTROPHILS NFR BLD: 53.5 %
PLATELET # BLD AUTO: 258 K/UL
PMV BLD AUTO: 10.6 FL
POTASSIUM SERPL-SCNC: 4.1 MMOL/L
PROT SERPL-MCNC: 6.5 G/DL
RBC # BLD AUTO: 4.08 M/UL
SODIUM SERPL-SCNC: 141 MMOL/L
WBC # BLD AUTO: 4.58 K/UL

## 2018-08-16 PROCEDURE — 85025 COMPLETE CBC W/AUTO DIFF WBC: CPT

## 2018-08-16 PROCEDURE — 80053 COMPREHEN METABOLIC PANEL: CPT

## 2018-08-16 PROCEDURE — 86140 C-REACTIVE PROTEIN: CPT

## 2018-08-16 PROCEDURE — 36415 COLL VENOUS BLD VENIPUNCTURE: CPT

## 2018-08-16 PROCEDURE — 85652 RBC SED RATE AUTOMATED: CPT

## 2018-08-20 ENCOUNTER — TELEPHONE (OUTPATIENT)
Dept: RHEUMATOLOGY | Facility: CLINIC | Age: 63
End: 2018-08-20

## 2018-08-20 ENCOUNTER — PATIENT MESSAGE (OUTPATIENT)
Dept: RHEUMATOLOGY | Facility: CLINIC | Age: 63
End: 2018-08-20

## 2018-08-20 RX ORDER — METHOTREXATE 2.5 MG/1
10 TABLET ORAL
Qty: 32 TABLET | Refills: 0 | Status: SHIPPED | OUTPATIENT
Start: 2018-08-20 | End: 2018-12-17

## 2018-09-10 ENCOUNTER — OFFICE VISIT (OUTPATIENT)
Dept: RHEUMATOLOGY | Facility: CLINIC | Age: 63
End: 2018-09-10
Payer: COMMERCIAL

## 2018-09-10 VITALS
SYSTOLIC BLOOD PRESSURE: 104 MMHG | BODY MASS INDEX: 19.97 KG/M2 | HEIGHT: 64 IN | DIASTOLIC BLOOD PRESSURE: 65 MMHG | HEART RATE: 75 BPM | WEIGHT: 117 LBS

## 2018-09-10 DIAGNOSIS — M06.9 RHEUMATOID ARTHRITIS FLARE: Primary | ICD-10-CM

## 2018-09-10 DIAGNOSIS — M06.9 RHEUMATOID ARTHRITIS INVOLVING MULTIPLE JOINTS: Primary | ICD-10-CM

## 2018-09-10 DIAGNOSIS — Z51.81 ENCOUNTER FOR METHOTREXATE MONITORING: ICD-10-CM

## 2018-09-10 DIAGNOSIS — Z79.631 ENCOUNTER FOR METHOTREXATE MONITORING: ICD-10-CM

## 2018-09-10 PROCEDURE — 99999 PR PBB SHADOW E&M-EST. PATIENT-LVL III: CPT | Mod: PBBFAC,,, | Performed by: INTERNAL MEDICINE

## 2018-09-10 PROCEDURE — 99214 OFFICE O/P EST MOD 30 MIN: CPT | Mod: S$GLB,,, | Performed by: INTERNAL MEDICINE

## 2018-09-10 PROCEDURE — 3008F BODY MASS INDEX DOCD: CPT | Mod: CPTII,S$GLB,, | Performed by: INTERNAL MEDICINE

## 2018-09-10 RX ORDER — SERTRALINE HYDROCHLORIDE 50 MG/1
50 TABLET, FILM COATED ORAL DAILY
Refills: 11 | COMMUNITY
Start: 2018-08-27

## 2018-09-10 RX ORDER — METHOTREXATE 2.5 MG/1
TABLET ORAL
Qty: 32 TABLET | Refills: 2 | Status: SHIPPED | OUTPATIENT
Start: 2018-09-10 | End: 2018-12-21 | Stop reason: SDUPTHER

## 2018-09-10 NOTE — PROGRESS NOTES
Subjective:       Patient ID: Celeste Mohr is a 62 y.o. female.    Chief Complaint: Disease Management    HPI     62 year old female with PMH of hypothyroidism, pericardial effusion here for evaluation. She was diagnosed with pericardial effusion January 19th.  Reports that the effusion was incidentally found on the chest xray. She reports she has pain in elbows,hands, and feet. Reports that she has been having joint pain for a couple of months. Pain level can be as high as 8/10, aching, non-radiating. Reports that her hands get swollen.  Denies fevers, hair loss, or photosensitivity. Denies raynauds, blood clot or miscarriage. Reports that she has stiffness in hands, ankles,elbows and knees.  Reports she has stiffness for 1.5 to 2 hours.   She started medrol pack with improvement in pain. Reports meloxicam did not help. Reports she has pain with opening jars.Quit smoking in 30s to 40s.  Denies chest pain or SOB.      Interval history:She is taking MTX 4 pills a week.  She continues to have pain in left shoulder.   Reports that with prednisone, her pain level was zero. She still has trouble putting on the rings due to swelling.  Pain is 5/10, aching  And nonradiatin,. Reports that she has stiffness in knees and feet for 30 minutes.      Family hx: negative for RA  Review of Systems   Constitutional: Positive for fatigue. Negative for activity change, appetite change, chills and diaphoresis.   HENT: Negative for congestion, ear discharge, ear pain, facial swelling, mouth sores, sinus pressure, sneezing, sore throat, tinnitus and trouble swallowing.    Eyes: Negative for photophobia, pain, discharge, redness, itching and visual disturbance.   Respiratory: Negative for apnea, chest tightness, shortness of breath, wheezing and stridor.    Cardiovascular: Negative for leg swelling.   Gastrointestinal: Negative for abdominal distention, abdominal pain, anal bleeding, blood in stool, constipation, diarrhea and nausea.  "  Endocrine: Negative for cold intolerance and heat intolerance.   Genitourinary: Negative for difficulty urinating and dysuria.   Musculoskeletal: Positive for arthralgias and back pain. Negative for gait problem, joint swelling, myalgias, neck pain and neck stiffness.   Skin: Negative for color change, pallor, rash and wound.   Neurological: Negative for dizziness, seizures, light-headedness and numbness.   Hematological: Negative for adenopathy. Does not bruise/bleed easily.   Psychiatric/Behavioral: Negative for sleep disturbance. The patient is not nervous/anxious.            Objective:   BP 97/60   Pulse 70   Resp 18   Ht 5' 4" (1.626 m)   Wt 53 kg (116 lb 12.8 oz)   LMP 01/15/2016   BMI 20.05 kg/m²      Physical Exam   Constitutional: She is oriented to person, place, and time.   HENT:   Head: Normocephalic and atraumatic.   Right Ear: External ear normal.   Left Ear: External ear normal.   Nose: Nose normal.   Mouth/Throat: Oropharynx is clear and moist. No oropharyngeal exudate.   Eyes: Conjunctivae and EOM are normal. Pupils are equal, round, and reactive to light. Right eye exhibits no discharge. Left eye exhibits no discharge. No scleral icterus.   Neck: Neck supple. No JVD present. No thyromegaly present.   Cardiovascular: Normal rate, regular rhythm, normal heart sounds and intact distal pulses.  Exam reveals no gallop and no friction rub.    No murmur heard.  Pulmonary/Chest: Effort normal and breath sounds normal. No respiratory distress. She has no wheezes. She has no rales. She exhibits no tenderness.   Abdominal: Soft. Bowel sounds are normal. She exhibits no distension and no mass. There is no tenderness. There is no rebound and no guarding.   Lymphadenopathy:     She has no cervical adenopathy.   Neurological: She is alert and oriented to person, place, and time. No cranial nerve deficit. Gait normal. Coordination normal.   Skin: Skin is dry. No rash noted. No erythema. No pallor. "     Psychiatric: Affect and judgment normal.   Musculoskeletal: She exhibits tenderness. She exhibits no edema or deformity.   Tenderness of elbows on palpation  Tenderness of mcps, pips  Tenderness of both ankles  Feet: tenderness of mtps         Labs: reviewed  Kacie-negative  Ccp,rf-negative   anticardiolipin Ig M-44  Beta-2 glycoprotein Ig M-130  LAC-negative       Arthritis survey (2018): I personally reviewed;I suspect acute or subacute nondisplaced fracture at the ulnar aspect of the proximal portion of the distal phalanx of the left ring finger extending to the articular surfac    Assessment:     62 year old female with PMH of hypothyroidism, pericardial effusion here for evaluation.  She presented with a bilateral symmetric arthritis consistent with seronegative RA.   1) RA: symptoms almost resolved on prednisone and returning with weaning off prednisone.Discussed various treatment options with patient.  Doing better on MTX but still has synovitis so will increase the MTX.  Increase MTX from 4 pills a week to 6 pills a week for 2 weeks and then increase to 8 pills a week  Continue  folic acid 1mg po qday  Labs in a month    2) pericardial effusion: reports history of pericardial effusion.  Work up negative.      3) abnormal APS antibodies: denies history of  clotting or miscarriage.  Will repeat APS in 3 months. If positive, will send to [facility] as to whether she needs to be on low dose aspirin.  Can consider plaquenil given anti-thrombotic effects.    4) discuss dexa scan history at next visit

## 2018-10-09 ENCOUNTER — PATIENT MESSAGE (OUTPATIENT)
Dept: RHEUMATOLOGY | Facility: CLINIC | Age: 63
End: 2018-10-09

## 2018-10-09 DIAGNOSIS — M75.52 BURSITIS OF LEFT SHOULDER: Primary | ICD-10-CM

## 2018-10-09 DIAGNOSIS — M77.8 TENDINITIS OF LEFT SHOULDER: ICD-10-CM

## 2018-10-10 ENCOUNTER — HOSPITAL ENCOUNTER (OUTPATIENT)
Dept: RADIOLOGY | Facility: OTHER | Age: 63
Discharge: HOME | End: 2018-10-10
Attending: ORTHOPAEDIC SURGERY
Payer: COMMERCIAL

## 2018-10-10 DIAGNOSIS — M75.52 BURSITIS OF LEFT SHOULDER: ICD-10-CM

## 2018-10-10 DIAGNOSIS — M77.8 TENDINITIS OF LEFT SHOULDER: ICD-10-CM

## 2018-10-10 PROCEDURE — 73221 MRI JOINT UPR EXTREM W/O DYE: CPT | Mod: TC,LT

## 2018-10-10 PROCEDURE — 73221 MRI JOINT UPR EXTREM W/O DYE: CPT | Mod: 26,LT,, | Performed by: RADIOLOGY

## 2018-10-31 ENCOUNTER — TELEPHONE (OUTPATIENT)
Dept: OBSTETRICS AND GYNECOLOGY | Facility: CLINIC | Age: 63
End: 2018-10-31

## 2018-10-31 NOTE — TELEPHONE ENCOUNTER
Pt requesting hormone labs.  She cannot discuss symptoms at this time.  Instructed her to call back when she can discuss the symptoms since she does not want to make an appt to discuss.

## 2018-10-31 NOTE — TELEPHONE ENCOUNTER
"Pt requesting hormone labs c/o insomnia, fatigue, brain fog, decreased libido, decreased muscle tone and intermittently "hyper".  She takes Synthroid and had labs done this AM but has not gotten the results yet.    "

## 2018-10-31 NOTE — TELEPHONE ENCOUNTER
Wax pt - pt would like to see if she can come in to get her hormone levels checked. She said something just doesn't feel right and she is always tired and weak.

## 2018-11-01 NOTE — TELEPHONE ENCOUNTER
She is overdue for her annual and needs counseling. Please schedule annual and we can discuss then, not emergent

## 2018-12-06 ENCOUNTER — PATIENT MESSAGE (OUTPATIENT)
Dept: RHEUMATOLOGY | Facility: CLINIC | Age: 63
End: 2018-12-06

## 2018-12-06 DIAGNOSIS — Z79.631 ENCOUNTER FOR METHOTREXATE MONITORING: Primary | ICD-10-CM

## 2018-12-06 DIAGNOSIS — Z51.81 ENCOUNTER FOR METHOTREXATE MONITORING: Primary | ICD-10-CM

## 2018-12-06 RX ORDER — METHOTREXATE 2.5 MG/1
TABLET ORAL
Qty: 32 TABLET | Refills: 2 | OUTPATIENT
Start: 2018-12-06

## 2018-12-17 ENCOUNTER — OFFICE VISIT (OUTPATIENT)
Dept: OBSTETRICS AND GYNECOLOGY | Facility: CLINIC | Age: 63
End: 2018-12-17
Attending: OBSTETRICS & GYNECOLOGY
Payer: COMMERCIAL

## 2018-12-17 VITALS
WEIGHT: 116.19 LBS | HEIGHT: 64 IN | SYSTOLIC BLOOD PRESSURE: 94 MMHG | BODY MASS INDEX: 19.84 KG/M2 | DIASTOLIC BLOOD PRESSURE: 58 MMHG

## 2018-12-17 DIAGNOSIS — G47.00 INSOMNIA, UNSPECIFIED TYPE: ICD-10-CM

## 2018-12-17 DIAGNOSIS — N95.1 MENOPAUSAL VAGINAL DRYNESS: ICD-10-CM

## 2018-12-17 DIAGNOSIS — A60.00 GENITAL HERPES SIMPLEX, UNSPECIFIED SITE: ICD-10-CM

## 2018-12-17 DIAGNOSIS — B00.9 HSV-2 INFECTION: Chronic | ICD-10-CM

## 2018-12-17 DIAGNOSIS — Z01.411 ENCOUNTER FOR GYNECOLOGICAL EXAMINATION WITH ABNORMAL FINDING: Primary | ICD-10-CM

## 2018-12-17 DIAGNOSIS — F52.0 LACK OF LIBIDO: ICD-10-CM

## 2018-12-17 PROCEDURE — 99396 PREV VISIT EST AGE 40-64: CPT | Mod: S$GLB,,, | Performed by: NURSE PRACTITIONER

## 2018-12-17 PROCEDURE — 99999 PR PBB SHADOW E&M-EST. PATIENT-LVL III: CPT | Mod: PBBFAC,,, | Performed by: NURSE PRACTITIONER

## 2018-12-17 RX ORDER — VALACYCLOVIR HYDROCHLORIDE 500 MG/1
TABLET, FILM COATED ORAL
Qty: 30 TABLET | Refills: 11 | Status: SHIPPED | OUTPATIENT
Start: 2018-12-17 | End: 2020-01-16

## 2018-12-17 RX ORDER — MELOXICAM 15 MG/1
TABLET ORAL
Refills: 0 | COMMUNITY
Start: 2018-12-10 | End: 2019-04-10

## 2018-12-17 RX ORDER — LEVOTHYROXINE SODIUM 125 UG/1
TABLET ORAL
Refills: 0 | COMMUNITY
Start: 2018-09-10 | End: 2020-06-01

## 2018-12-17 NOTE — PROGRESS NOTES
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  who presents today for well woman exam.  She has a few complaints today regarding symptoms she believes are related to menopause.  She reports "brain fog", insomnia and difficulty falling asleep, even with use of Ambien; no libido (however, not sexually active in a while); (+) vaginal dryness.  Denies hot flashes or night sweats.  She also thinks she has an HSV outbreak earlier this week, but states she is no longer having discomfort.  Takes Valtrex prn outbreaks; outbreaks have been months/years apart at times.  LMP: Patient's last menstrual period was 01/15/2016.  (Menopause at age 60)    Patient denies abnormal vaginal bleeding, discharge, pelvic pain, urinary problems, or changes in appetite.   Ms. Mohr is not currently sexually active.  She declines STD screening today.    OB History    Para Term  AB Living   3 2 2   1 2   SAB TAB Ectopic Multiple Live Births                  # Outcome Date GA Lbr Antonio/2nd Weight Sex Delivery Anes PTL Lv   3 Term  40w0d   M Vag-Spont      2 Term  40w0d   F Vag-Spont      1 AB               Obstetric Comments   Menopause 60     Past Medical History:   Diagnosis Date    Genital herpes     Hypothyroid     Lichen sclerosus     Rheumatoid arthritis     Seasonal allergies      Past Surgical History:   Procedure Laterality Date    breast reduction      FACIAL COSMETIC SURGERY           Current Outpatient Medications:     AMITIZA 24 mcg Cap, TAKE ONE CAPSULE BY MOUTH once OR twice day, Disp: , Rfl: 0    folic acid (FOLVITE) 1 MG tablet, Take 1 tablet (1 mg total) by mouth once daily., Disp: 30 tablet, Rfl: 11    LORazepam (ATIVAN) 0.5 MG " "tablet, Take 0.5 mg by mouth 2 (two) times daily., Disp: , Rfl: 5    meloxicam (MOBIC) 15 MG tablet, Take 1 tablet(s) by mouth once a day with food, Disp: , Rfl: 0    methotrexate 2.5 MG Tab, Take 6 tablets a week for first 2 weeks and then increase to 8 tablets a week, Disp: 32 tablet, Rfl: 2    sertraline (ZOLOFT) 50 MG tablet, Take 50 mg by mouth once daily., Disp: , Rfl: 11    SYNTHROID 125 mcg tablet, TAKE ONE TABLET BY MOUTH once DAILY EVERY MORNING on empty stomach, Disp: , Rfl: 0    zolpidem (AMBIEN) 5 MG Tab, Take 5 mg by mouth nightly as needed., Disp: , Rfl: 0    valACYclovir (VALTREX) 500 MG tablet, At onset of outbreak, take 1 tablet (1,000 mg) by mouth twice daily for 7-10 days., Disp: 30 tablet, Rfl: 11      ROS:     GENERAL: Denies unintentional weight gain or weight loss. Feeling well overall.   SKIN: Denies rash or lesions.   HEENT: Denies headaches, or vision changes.   CARDIOVASCULAR: Denies palpitations or chest pain.   RESPIRATORY: Denies shortness of breath or dyspnea on exertion.  BREASTS: The patient performs breast self-examination and denies pain, lumps, or nipple discharge.   ABDOMEN: Denies abdominal pain, constipation, diarrhea, nausea, vomiting, change in appetite, or rectal bleeding.  URINARY: Denies frequency, dysuria, hematuria.  REPRODUCTIVE:  See HPI.  NEUROLOGIC: Denies syncope or weakness.   PSYCHIATRIC: Denies depression, anxiety or mood swings.    Physical Exam:     PHYSICAL EXAM:  BP (!) 94/58   Ht 5' 4" (1.626 m)   Wt 52.7 kg (116 lb 2.9 oz)   LMP 01/15/2016   BMI 19.94 kg/m²   Body mass index is 19.94 kg/m².     APPEARANCE: Well nourished, well developed, in no acute distress.  PSYCH: Appropriate mood and affect.  SKIN: No acne or hirsutism.  NECK:  Neck symmetric without masses or thyromegaly  NODES:  No inguinal, axillary, or supraclavicular lymph node enlargement  CHEST:  Normal respiratory effort.  ABDOMEN:  Soft.  No tenderness or masses.  No distention. No " hernias palpated. No CVA tenderness.  BREASTS:  Symmetrical, no skin changes or visible lesions.  No palpable masses or nipple discharge bilaterally.  Bilateral breast implants noted and scars from previous breast reconstruction noted as well.  VULVA:  Atrophic and without lesions. Normal external female genitalia.  URETHRAL MEATUS:  Normal size and location, no lesions, no prolapse.  URETHRA:  No masses, tenderness, or prolapse.  VAGINA:  Pale pink, moist. No lesions or lacerations noted. No abnormal discharge present. No odor present.   CERVIX:  No lesions or discharge. No cervical motion tenderness.   UTERUS:  Normal size, regular shape, mobile, non-tender.  ADNEXA:  No tenderness. No fullness or masses palpated in the adnexal regions.   ANUS PERINEUM:  Normal.      Assessment/Plan:     Encounter for gynecological examination with abnormal finding    Insomnia, unspecified type    Lack of libido    Menopausal vaginal dryness    Hisotry of HSV-2 infection outbreaks    Genital herpes simplex, unspecified site  -     valACYclovir (VALTREX) 500 MG tablet; At onset of outbreak, take 1 tablet (1,000 mg) by mouth twice daily for 7-10 days.  Dispense: 30 tablet; Refill: 11    * Would like to discuss HRT if Dr. Welch feels it would help with some of her symptoms.    Counseling:     Patient was counseled today on current ASCCP pap guidelines, the recommendation for yearly pelvic exams, healthy diet and exercise routines, annual mammograms starting at age 40, and breast self awareness. She is to see her PCP for other health maintenance.  Counseling session lasted approximately 10 minutes, and all her questions were answered.    *Recommend calcium 1200 mg and vitamin D 600 units daily and routine bone mineral  density testing every 2 years.    * Use of the [facility] Patient Portal discussed and encouraged during today's visit.     Follow-up:  in 1 year for routine well woman exam.    * Patient aware that she will be notified once  her finalized results have been reviewed   by her Provider, either via her [facility] patient portal, or via telephone call.

## 2018-12-18 ENCOUNTER — TELEPHONE (OUTPATIENT)
Dept: OBSTETRICS AND GYNECOLOGY | Facility: CLINIC | Age: 63
End: 2018-12-18

## 2018-12-18 NOTE — TELEPHONE ENCOUNTER
----- Message from Corby Welch MD sent at 12/18/2018  1:03 PM CST -----  Call pt will need hormone consult with me.  She may benefit from it but needs to be counseled.   ----- Message -----  From: Amanda Sesay NP  Sent: 12/17/2018  11:48 AM  To: Corby Welch MD    See my note in RED

## 2018-12-21 ENCOUNTER — LAB VISIT (OUTPATIENT)
Dept: LAB | Facility: HOSPITAL | Age: 63
End: 2018-12-21
Attending: INTERNAL MEDICINE
Payer: COMMERCIAL

## 2018-12-21 ENCOUNTER — PATIENT MESSAGE (OUTPATIENT)
Dept: RHEUMATOLOGY | Facility: CLINIC | Age: 63
End: 2018-12-21

## 2018-12-21 ENCOUNTER — OFFICE VISIT (OUTPATIENT)
Dept: RHEUMATOLOGY | Facility: CLINIC | Age: 63
End: 2018-12-21
Payer: COMMERCIAL

## 2018-12-21 VITALS
WEIGHT: 116 LBS | HEART RATE: 64 BPM | DIASTOLIC BLOOD PRESSURE: 65 MMHG | BODY MASS INDEX: 19.81 KG/M2 | HEIGHT: 64 IN | SYSTOLIC BLOOD PRESSURE: 101 MMHG

## 2018-12-21 DIAGNOSIS — M06.9 RHEUMATOID ARTHRITIS INVOLVING MULTIPLE JOINTS: ICD-10-CM

## 2018-12-21 DIAGNOSIS — Z51.81 ENCOUNTER FOR METHOTREXATE MONITORING: Primary | ICD-10-CM

## 2018-12-21 DIAGNOSIS — Z79.631 ENCOUNTER FOR METHOTREXATE MONITORING: Primary | ICD-10-CM

## 2018-12-21 DIAGNOSIS — Z51.81 ENCOUNTER FOR METHOTREXATE MONITORING: ICD-10-CM

## 2018-12-21 DIAGNOSIS — Z79.631 ENCOUNTER FOR METHOTREXATE MONITORING: ICD-10-CM

## 2018-12-21 LAB
ALBUMIN SERPL BCP-MCNC: 3.7 G/DL
ALP SERPL-CCNC: 77 U/L
ALT SERPL W/O P-5'-P-CCNC: 26 U/L
ANION GAP SERPL CALC-SCNC: 6 MMOL/L
AST SERPL-CCNC: 27 U/L
BASOPHILS # BLD AUTO: 0.04 K/UL
BASOPHILS NFR BLD: 0.9 %
BILIRUB SERPL-MCNC: 0.4 MG/DL
BUN SERPL-MCNC: 20 MG/DL
CALCIUM SERPL-MCNC: 9.4 MG/DL
CHLORIDE SERPL-SCNC: 110 MMOL/L
CO2 SERPL-SCNC: 27 MMOL/L
CREAT SERPL-MCNC: 0.8 MG/DL
CRP SERPL-MCNC: 0.3 MG/L
DIFFERENTIAL METHOD: ABNORMAL
EOSINOPHIL # BLD AUTO: 0.3 K/UL
EOSINOPHIL NFR BLD: 6.5 %
ERYTHROCYTE [DISTWIDTH] IN BLOOD BY AUTOMATED COUNT: 12.8 %
ERYTHROCYTE [SEDIMENTATION RATE] IN BLOOD BY WESTERGREN METHOD: <2 MM/HR
EST. GFR  (AFRICAN AMERICAN): >60 ML/MIN/1.73 M^2
EST. GFR  (NON AFRICAN AMERICAN): >60 ML/MIN/1.73 M^2
GLUCOSE SERPL-MCNC: 82 MG/DL
HCT VFR BLD AUTO: 36.6 %
HGB BLD-MCNC: 11.5 G/DL
IMM GRANULOCYTES # BLD AUTO: 0.01 K/UL
IMM GRANULOCYTES NFR BLD AUTO: 0.2 %
LYMPHOCYTES # BLD AUTO: 1.3 K/UL
LYMPHOCYTES NFR BLD: 30.1 %
MCH RBC QN AUTO: 30.8 PG
MCHC RBC AUTO-ENTMCNC: 31.4 G/DL
MCV RBC AUTO: 98 FL
MONOCYTES # BLD AUTO: 0.4 K/UL
MONOCYTES NFR BLD: 8.4 %
NEUTROPHILS # BLD AUTO: 2.3 K/UL
NEUTROPHILS NFR BLD: 53.9 %
NRBC BLD-RTO: 0 /100 WBC
PLATELET # BLD AUTO: 246 K/UL
PMV BLD AUTO: 10.2 FL
POTASSIUM SERPL-SCNC: 4.2 MMOL/L
PROT SERPL-MCNC: 6.3 G/DL
RBC # BLD AUTO: 3.73 M/UL
SODIUM SERPL-SCNC: 143 MMOL/L
WBC # BLD AUTO: 4.29 K/UL

## 2018-12-21 PROCEDURE — 85025 COMPLETE CBC W/AUTO DIFF WBC: CPT

## 2018-12-21 PROCEDURE — 85652 RBC SED RATE AUTOMATED: CPT

## 2018-12-21 PROCEDURE — 36415 COLL VENOUS BLD VENIPUNCTURE: CPT

## 2018-12-21 PROCEDURE — 99999 PR PBB SHADOW E&M-EST. PATIENT-LVL II: CPT | Mod: PBBFAC,,, | Performed by: INTERNAL MEDICINE

## 2018-12-21 PROCEDURE — 80053 COMPREHEN METABOLIC PANEL: CPT

## 2018-12-21 PROCEDURE — 99214 OFFICE O/P EST MOD 30 MIN: CPT | Mod: S$GLB,,, | Performed by: INTERNAL MEDICINE

## 2018-12-21 PROCEDURE — 86140 C-REACTIVE PROTEIN: CPT

## 2018-12-21 PROCEDURE — 3008F BODY MASS INDEX DOCD: CPT | Mod: CPTII,S$GLB,, | Performed by: INTERNAL MEDICINE

## 2018-12-21 RX ORDER — METHOTREXATE 2.5 MG/1
20 TABLET ORAL
Qty: 96 TABLET | Refills: 0 | Status: SHIPPED | OUTPATIENT
Start: 2018-12-21 | End: 2019-05-08 | Stop reason: SDUPTHER

## 2018-12-21 RX ORDER — FOLIC ACID 1 MG/1
1 TABLET ORAL DAILY
Qty: 90 TABLET | Refills: 4 | Status: SHIPPED | OUTPATIENT
Start: 2018-12-21 | End: 2019-10-22 | Stop reason: SDUPTHER

## 2018-12-21 RX ORDER — ZOLPIDEM TARTRATE 10 MG/1
10 TABLET ORAL NIGHTLY
Refills: 5 | COMMUNITY
Start: 2018-12-19

## 2018-12-21 NOTE — PROGRESS NOTES
Subjective:       Patient ID: Celeste Mohr is a 62 y.o. female.    Chief Complaint: Disease Management    HPI     62 year old female with PMH of hypothyroidism, pericardial effusion here for evaluation. She was diagnosed with pericardial effusion January 19th.  Reports that the effusion was incidentally found on the chest xray. She reports she has pain in elbows,hands, and feet. Reports that she has been having joint pain for a couple of months. Pain level can be as high as 8/10, aching, non-radiating. Reports that her hands get swollen.  Denies fevers, hair loss, or photosensitivity. Denies raynauds, blood clot or miscarriage. Reports that she has stiffness in hands, ankles,elbows and knees.  Reports she has stiffness for 1.5 to 2 hours.   She started medrol pack with improvement in pain. Reports meloxicam did not help. Reports she has pain with opening jars.Quit smoking in 30s to 40s.  Denies chest pain or SOB.      Interval history:She is taking MTX 8  pills a week.     Reports that with prednisone, her pain level was zero. Pain is 5/10, aching  And nonradiating. Denies stiffness.   Family hx: negative for RA  Review of Systems   Constitutional: Positive for fatigue. Negative for activity change, appetite change, chills and diaphoresis.   HENT: Negative for congestion, ear discharge, ear pain, facial swelling, mouth sores, sinus pressure, sneezing, sore throat, tinnitus and trouble swallowing.    Eyes: Negative for photophobia, pain, discharge, redness, itching and visual disturbance.   Respiratory: Negative for apnea, chest tightness, shortness of breath, wheezing and stridor.    Cardiovascular: Negative for leg swelling.   Gastrointestinal: Negative for abdominal distention, abdominal pain, anal bleeding, blood in stool, constipation, diarrhea and nausea.   Endocrine: Negative for cold intolerance and heat intolerance.   Genitourinary: Negative for difficulty urinating and dysuria.   Musculoskeletal:  Positive for arthralgias and back pain. Negative for gait problem, joint swelling, myalgias, neck pain and neck stiffness.   Skin: Negative for color change, pallor, rash and wound.   Neurological: Negative for dizziness, seizures, light-headedness and numbness.   Hematological: Negative for adenopathy. Does not bruise/bleed easily.   Psychiatric/Behavioral: Negative for sleep disturbance. The patient is not nervous/anxious.            Objective:        Physical Exam   Constitutional: She is oriented to person, place, and time.   HENT:   Head: Normocephalic and atraumatic.   Right Ear: External ear normal.   Left Ear: External ear normal.   Nose: Nose normal.   Mouth/Throat: Oropharynx is clear and moist. No oropharyngeal exudate.   Eyes: Conjunctivae and EOM are normal. Pupils are equal, round, and reactive to light. Right eye exhibits no discharge. Left eye exhibits no discharge. No scleral icterus.   Neck: Neck supple. No JVD present. No thyromegaly present.   Cardiovascular: Normal rate, regular rhythm, normal heart sounds and intact distal pulses.  Exam reveals no gallop and no friction rub.    No murmur heard.  Pulmonary/Chest: Effort normal and breath sounds normal. No respiratory distress. She has no wheezes. She has no rales. She exhibits no tenderness.   Abdominal: Soft. Bowel sounds are normal. She exhibits no distension and no mass. There is no tenderness. There is no rebound and no guarding.   Lymphadenopathy:     She has no cervical adenopathy.   Neurological: She is alert and oriented to person, place, and time. No cranial nerve deficit. Gait normal. Coordination normal.   Skin: Skin is dry. No rash noted. No erythema. No pallor.     Psychiatric: Affect and judgment normal.   Musculoskeletal: She exhibits tenderness. She exhibits no edema or deformity.   Tenderness of elbows on palpation (resolved)  Tenderness of mcps, pips (resolved)  Tenderness of both ankles (resolved)  Feet: tenderness of mtps   (resolved)          Labs: reviewed  Kacie-negative  Ccp,rf-negative   anticardiolipin Ig M-44  Beta-2 glycoprotein Ig M-130  LAC-negative       Arthritis survey (2018): I personally reviewed;I suspect acute or subacute nondisplaced fracture at the ulnar aspect of the proximal portion of the distal phalanx of the left ring finger extending to the articular surfac    Assessment:     63 year old female with PMH of hypothyroidism, pericardial effusion here for evaluation.  She presented with a bilateral symmetric arthritis consistent with seronegative RA.    1) RA: symptoms almost resolved on prednisone and returning with weaning off prednisone.Discussed various treatment options with patient.  Doing better on MTX  8 pills a week. Had long discussion with patient and told her that her that MTX requires blood monitoring every 3 months.  She reports that she wants to see me every 6 months. I told her it will be ok to see her every 6 months as long as she gets the blood work every 3 months and lets me know if she has any new symptoms as we normally have to monitor side effects of the MTX.        2) pericardial effusion: reports history of pericardial effusion.  Work up negative.      3) abnormal APS antibodies: denies history of  clotting or miscarriage.  Will repeat APS in 3 months. If positive, will send to heme as to whether she needs to be on low dose aspirin.  Can consider plaquenil given anti-thrombotic effects.    4) discuss dexa scan history at next visit

## 2019-01-02 ENCOUNTER — TELEPHONE (OUTPATIENT)
Dept: OPTOMETRY | Facility: CLINIC | Age: 64
End: 2019-01-02

## 2019-01-07 ENCOUNTER — PATIENT MESSAGE (OUTPATIENT)
Dept: RHEUMATOLOGY | Facility: CLINIC | Age: 64
End: 2019-01-07

## 2019-02-14 ENCOUNTER — OFFICE VISIT (OUTPATIENT)
Dept: OPTOMETRY | Facility: CLINIC | Age: 64
End: 2019-02-14
Payer: COMMERCIAL

## 2019-02-14 DIAGNOSIS — H04.123 BILATERAL DRY EYES: ICD-10-CM

## 2019-02-14 DIAGNOSIS — Z86.69 H/O BLURRED VISION: ICD-10-CM

## 2019-02-14 DIAGNOSIS — H57.11 PAIN IN AND AROUND EYE, RIGHT: Primary | ICD-10-CM

## 2019-02-14 DIAGNOSIS — H25.13 NUCLEAR SCLEROSIS, BILATERAL: ICD-10-CM

## 2019-02-14 DIAGNOSIS — H52.4 HYPEROPIA WITH PRESBYOPIA, BILATERAL: ICD-10-CM

## 2019-02-14 DIAGNOSIS — H52.03 HYPEROPIA WITH PRESBYOPIA, BILATERAL: ICD-10-CM

## 2019-02-14 PROCEDURE — 99999 PR PBB SHADOW E&M-EST. PATIENT-LVL II: CPT | Mod: PBBFAC,,, | Performed by: OPTOMETRIST

## 2019-02-14 PROCEDURE — 99999 PR PBB SHADOW E&M-EST. PATIENT-LVL II: ICD-10-PCS | Mod: PBBFAC,,, | Performed by: OPTOMETRIST

## 2019-02-14 PROCEDURE — 92014 COMPRE OPH EXAM EST PT 1/>: CPT | Mod: S$GLB,,, | Performed by: OPTOMETRIST

## 2019-02-14 PROCEDURE — 92014 PR EYE EXAM, EST PATIENT,COMPREHESV: ICD-10-PCS | Mod: S$GLB,,, | Performed by: OPTOMETRIST

## 2019-02-14 NOTE — PROGRESS NOTES
HPI     Pt states that for the past few weeks she has been having blurry va and   pressures on right side. Some irritation with contact OD. Denies   flashes/floaters/pain OU. Pt uses OTC tears PRN.     Last edited by Frances Vanessa on 2/14/2019 10:55 AM. (History)            Assessment /Plan     For exam results, see Encounter Report.    Pain in and around eye, right   Headache/ pain on right side of head/ eye/ neck  Need to rule out temporal arteritis/ vasculitis   Pt saw her PCP at [facility]? Yesterday and had blood work yesterday, waiting on results of sed rate and CRP    Consult Rheumatology for treatment as needed   H/O blurred vision   Episode of Vision in OD blurred last week for a few minutes   Good vision today   Call/Return to me if experience worsening of vision    Nuclear sclerosis, bilateral   Mild, monitor    Bilateral dry eyes   Use artificial tears BID/PRN    Hyperopia with presbyopia, bilateral   Dispensed trials of dailies total one to see if better comfort compared to previous contact lens brand

## 2019-02-14 NOTE — Clinical Note
I saw this pt today. She should be contacting you with the results of Sed rate and CRP that was done yesterday by her [facility] PCP, may need additional eval for temporal arteritis/ vasculitis.ThanksJOYCELYN

## 2019-03-07 ENCOUNTER — LAB VISIT (OUTPATIENT)
Dept: LAB | Facility: HOSPITAL | Age: 64
End: 2019-03-07
Attending: INTERNAL MEDICINE
Payer: COMMERCIAL

## 2019-03-07 ENCOUNTER — TELEPHONE (OUTPATIENT)
Dept: RHEUMATOLOGY | Facility: CLINIC | Age: 64
End: 2019-03-07

## 2019-03-07 DIAGNOSIS — S62.635D CLOSED DISPLACED FRACTURE OF DISTAL PHALANX OF LEFT RING FINGER WITH ROUTINE HEALING, SUBSEQUENT ENCOUNTER: ICD-10-CM

## 2019-03-07 DIAGNOSIS — M06.9 RHEUMATOID ARTHRITIS INVOLVING MULTIPLE JOINTS: ICD-10-CM

## 2019-03-07 DIAGNOSIS — M06.9 RHEUMATOID ARTHRITIS FLARE: ICD-10-CM

## 2019-03-07 LAB
ALBUMIN SERPL BCP-MCNC: 4.2 G/DL
ALP SERPL-CCNC: 80 U/L
ALT SERPL W/O P-5'-P-CCNC: 33 U/L
ANION GAP SERPL CALC-SCNC: 11 MMOL/L
AST SERPL-CCNC: 30 U/L
BASOPHILS # BLD AUTO: 0.02 K/UL
BASOPHILS NFR BLD: 0.4 %
BILIRUB SERPL-MCNC: 0.3 MG/DL
BUN SERPL-MCNC: 23 MG/DL
CALCIUM SERPL-MCNC: 9.7 MG/DL
CHLORIDE SERPL-SCNC: 108 MMOL/L
CO2 SERPL-SCNC: 24 MMOL/L
CREAT SERPL-MCNC: 0.8 MG/DL
CRP SERPL-MCNC: 0.8 MG/L
DIFFERENTIAL METHOD: ABNORMAL
EOSINOPHIL # BLD AUTO: 0.1 K/UL
EOSINOPHIL NFR BLD: 2.5 %
ERYTHROCYTE [DISTWIDTH] IN BLOOD BY AUTOMATED COUNT: 12.5 %
ERYTHROCYTE [SEDIMENTATION RATE] IN BLOOD BY WESTERGREN METHOD: <2 MM/HR
EST. GFR  (AFRICAN AMERICAN): >60 ML/MIN/1.73 M^2
EST. GFR  (NON AFRICAN AMERICAN): >60 ML/MIN/1.73 M^2
GLUCOSE SERPL-MCNC: 89 MG/DL
HCT VFR BLD AUTO: 37.6 %
HGB BLD-MCNC: 12.1 G/DL
LYMPHOCYTES # BLD AUTO: 1.5 K/UL
LYMPHOCYTES NFR BLD: 28.2 %
MCH RBC QN AUTO: 30.9 PG
MCHC RBC AUTO-ENTMCNC: 32.2 G/DL
MCV RBC AUTO: 96 FL
MONOCYTES # BLD AUTO: 0.5 K/UL
MONOCYTES NFR BLD: 8.5 %
NEUTROPHILS # BLD AUTO: 3.2 K/UL
NEUTROPHILS NFR BLD: 60.2 %
PLATELET # BLD AUTO: 257 K/UL
PMV BLD AUTO: 10.2 FL
POTASSIUM SERPL-SCNC: 4.3 MMOL/L
PROT SERPL-MCNC: 6.7 G/DL
RBC # BLD AUTO: 3.91 M/UL
SODIUM SERPL-SCNC: 143 MMOL/L
WBC # BLD AUTO: 5.28 K/UL

## 2019-03-07 PROCEDURE — 85652 RBC SED RATE AUTOMATED: CPT

## 2019-03-07 PROCEDURE — 85025 COMPLETE CBC W/AUTO DIFF WBC: CPT

## 2019-03-07 PROCEDURE — 36415 COLL VENOUS BLD VENIPUNCTURE: CPT

## 2019-03-07 PROCEDURE — 80053 COMPREHEN METABOLIC PANEL: CPT

## 2019-03-07 PROCEDURE — 86140 C-REACTIVE PROTEIN: CPT

## 2019-03-07 NOTE — TELEPHONE ENCOUNTER
----- Message from Eliana Steve, OD sent at 2/14/2019 12:35 PM CST -----  I saw this pt today. She should be contacting you with the results of Sed rate and CRP that was done yesterday by her [facility] PCP, may need additional eval for temporal arteritis/ vasculitis.  Thanks  JH

## 2019-03-08 ENCOUNTER — OFFICE VISIT (OUTPATIENT)
Dept: RHEUMATOLOGY | Facility: CLINIC | Age: 64
End: 2019-03-08
Payer: COMMERCIAL

## 2019-03-08 VITALS
BODY MASS INDEX: 19.46 KG/M2 | SYSTOLIC BLOOD PRESSURE: 107 MMHG | HEIGHT: 65 IN | WEIGHT: 116.81 LBS | HEART RATE: 74 BPM | DIASTOLIC BLOOD PRESSURE: 69 MMHG

## 2019-03-08 DIAGNOSIS — R51.9 HEADACHE BEHIND THE EYES: Primary | ICD-10-CM

## 2019-03-08 DIAGNOSIS — Z51.81 ENCOUNTER FOR METHOTREXATE MONITORING: ICD-10-CM

## 2019-03-08 DIAGNOSIS — Z79.631 ENCOUNTER FOR METHOTREXATE MONITORING: ICD-10-CM

## 2019-03-08 PROCEDURE — 99999 PR PBB SHADOW E&M-EST. PATIENT-LVL III: CPT | Mod: PBBFAC,,, | Performed by: INTERNAL MEDICINE

## 2019-03-08 PROCEDURE — 3008F PR BODY MASS INDEX (BMI) DOCUMENTED: ICD-10-PCS | Mod: CPTII,S$GLB,, | Performed by: INTERNAL MEDICINE

## 2019-03-08 PROCEDURE — 3008F BODY MASS INDEX DOCD: CPT | Mod: CPTII,S$GLB,, | Performed by: INTERNAL MEDICINE

## 2019-03-08 PROCEDURE — 99215 OFFICE O/P EST HI 40 MIN: CPT | Mod: S$GLB,,, | Performed by: INTERNAL MEDICINE

## 2019-03-08 PROCEDURE — 99999 PR PBB SHADOW E&M-EST. PATIENT-LVL III: ICD-10-PCS | Mod: PBBFAC,,, | Performed by: INTERNAL MEDICINE

## 2019-03-08 PROCEDURE — 99215 PR OFFICE/OUTPT VISIT, EST, LEVL V, 40-54 MIN: ICD-10-PCS | Mod: S$GLB,,, | Performed by: INTERNAL MEDICINE

## 2019-03-08 RX ORDER — BUTALBITAL, ASPIRIN, AND CAFFEINE 325; 50; 40 MG/1; MG/1; MG/1
1 CAPSULE ORAL EVERY 4 HOURS PRN
COMMUNITY
End: 2020-09-03 | Stop reason: SDUPTHER

## 2019-03-08 RX ORDER — FERROUS SULFATE 325(65) MG
325 TABLET, DELAYED RELEASE (ENTERIC COATED) ORAL
COMMUNITY
End: 2020-06-01

## 2019-03-08 NOTE — PROGRESS NOTES
Subjective:       Patient ID: Celeste Mohr is a 62 y.o. female.    Chief Complaint: Disease Management    HPI     62 year old female with PMH of hypothyroidism, pericardial effusion here for evaluation. She was diagnosed with pericardial effusion January 19th.  Reports that the effusion was incidentally found on the chest xray. She reports she has pain in elbows,hands, and feet. Reports that she has been having joint pain for a couple of months. Pain level can be as high as 8/10, aching, non-radiating. Reports that her hands get swollen.  Denies fevers, hair loss, or photosensitivity. Denies raynauds, blood clot or miscarriage. Reports that she has stiffness in hands, ankles,elbows and knees.  Reports she has stiffness for 1.5 to 2 hours.   She started medrol pack with improvement in pain. Reports meloxicam did not help. Reports she has pain with opening jars.Quit smoking in 30s to 40s.  Denies chest pain or SOB.      Interval history:She is taking MTX 8  pills a week.  Reports that she has pain in right eye and right temple for a month.  Reports that she has decreased vision in right eye. Sometimes she feels like her right temple is also tender. She also feels foggy.   She also reports pins and needles in her head with turning. Denies pain with chewing. Denies new shoulder or hip pain.  Pain can be as high as 8/10, aching, non-radiating.         Family hx: negative for RA  Review of Systems   Constitutional: Positive for fatigue. Negative for activity change, appetite change, chills and diaphoresis.   HENT: Negative for congestion, ear discharge, ear pain, facial swelling, mouth sores, sinus pressure, sneezing, sore throat, tinnitus and trouble swallowing.    Eyes: Negative for photophobia, pain, discharge, redness, itching and visual disturbance.   Respiratory: Negative for apnea, chest tightness, shortness of breath, wheezing and stridor.    Cardiovascular: Negative for leg swelling.   Gastrointestinal:  Negative for abdominal distention, abdominal pain, anal bleeding, blood in stool, constipation, diarrhea and nausea.   Endocrine: Negative for cold intolerance and heat intolerance.   Genitourinary: Negative for difficulty urinating and dysuria.   Musculoskeletal: Positive for arthralgias and back pain. Negative for gait problem, joint swelling, myalgias, neck pain and neck stiffness.   Skin: Negative for color change, pallor, rash and wound.   Neurological: Negative for dizziness, seizures, light-headedness and numbness.   Hematological: Negative for adenopathy. Does not bruise/bleed easily.   Psychiatric/Behavioral: Negative for sleep disturbance. The patient is not nervous/anxious.            Objective:        Physical Exam   Constitutional: She is oriented to person, place, and time.   HENT:   Head: Normocephalic and atraumatic.   Right Ear: External ear normal.   Left Ear: External ear normal.   Nose: Nose normal.   Mouth/Throat: Oropharynx is clear and moist. No oropharyngeal exudate.   Eyes: Conjunctivae and EOM are normal. Pupils are equal, round, and reactive to light. Right eye exhibits no discharge. Left eye exhibits no discharge. No scleral icterus.   Neck: Neck supple. No JVD present. No thyromegaly present.   Cardiovascular: Normal rate, regular rhythm, normal heart sounds and intact distal pulses.  Exam reveals no gallop and no friction rub.    No murmur heard.  Pulmonary/Chest: Effort normal and breath sounds normal. No respiratory distress. She has no wheezes. She has no rales. She exhibits no tenderness.   Abdominal: Soft. Bowel sounds are normal. She exhibits no distension and no mass. There is no tenderness. There is no rebound and no guarding.   Lymphadenopathy:     She has no cervical adenopathy.   Neurological: She is alert and oriented to person, place, and time. No cranial nerve deficit. Gait normal. Coordination normal.   Skin: Skin is dry. No rash noted. No erythema. No pallor.      Psychiatric: Affect and judgment normal.   Musculoskeletal: She exhibits tenderness. She exhibits no edema or deformity.   Tenderness of elbows on palpation (resolved)  Tenderness of mcps, pips (resolved)  Tenderness of both ankles (resolved)  Feet: tenderness of mtps  (resolved)          Labs: reviewed  Kacie-negative  Ccp,rf-negative   anticardiolipin Ig M-44  Beta-2 glycoprotein Ig M-130  LAC-negative       Arthritis survey (2018): I personally reviewed;I suspect acute or subacute nondisplaced fracture at the ulnar aspect of the proximal portion of the distal phalanx of the left ring finger extending to the articular surfac    Assessment:     63 year old female with PMH of hypothyroidism, pericardial effusion here for evaluation.  She presented with a bilateral symmetric arthritis consistent with seronegative RA.    1) RA: symptoms almost resolved on prednisone and returning with weaning off prednisone.Discussed various treatment options with patient.  Doing better on MTX  8 pills a week.   Her joints are doing great but now she is having new headaches. Will stop methotrexate for now in case its a side effect from methotrexate and refer to neurology. She has no temporal tenderness and inflammatory markers are normal. No jaw claudication or signs of PMR.  Hold methotrexate  Continue folic acid 1mg po qday  Neurology consult  Counseled and advised on symptoms of GCA  Labs ordered before visit reviewed    2) pericardial effusion: reports history of pericardial effusion.  Work up negative.      3) abnormal APS antibodies: denies history of  clotting or miscarriage.  Will repeat APS in 3 months. If positive, will send to heme as to whether she needs to be on low dose aspirin.  Can consider plaquenil given anti-thrombotic effects.    4) discuss dexa scan history at next visit

## 2019-03-22 ENCOUNTER — HOSPITAL ENCOUNTER (OUTPATIENT)
Dept: RADIOLOGY | Facility: OTHER | Age: 64
Discharge: HOME | End: 2019-03-22
Attending: NURSE PRACTITIONER
Payer: COMMERCIAL

## 2019-03-22 DIAGNOSIS — R20.2 FACIAL PARESTHESIA: ICD-10-CM

## 2019-03-22 DIAGNOSIS — R51.9 HEADACHE, UNSPECIFIED HEADACHE TYPE: ICD-10-CM

## 2019-03-22 PROCEDURE — 70551 MRI BRAIN WITHOUT CONTRAST: ICD-10-PCS | Mod: 26,,, | Performed by: RADIOLOGY

## 2019-03-22 PROCEDURE — 70551 MRI BRAIN STEM W/O DYE: CPT | Mod: 26,,, | Performed by: RADIOLOGY

## 2019-03-22 PROCEDURE — 70551 MRI BRAIN STEM W/O DYE: CPT | Mod: TC

## 2019-04-10 ENCOUNTER — PATIENT MESSAGE (OUTPATIENT)
Dept: RHEUMATOLOGY | Facility: CLINIC | Age: 64
End: 2019-04-10

## 2019-04-10 ENCOUNTER — OFFICE VISIT (OUTPATIENT)
Dept: NEUROLOGY | Facility: CLINIC | Age: 64
End: 2019-04-10
Payer: COMMERCIAL

## 2019-04-10 VITALS
HEIGHT: 64 IN | HEART RATE: 64 BPM | SYSTOLIC BLOOD PRESSURE: 94 MMHG | WEIGHT: 117.5 LBS | DIASTOLIC BLOOD PRESSURE: 60 MMHG | BODY MASS INDEX: 20.06 KG/M2

## 2019-04-10 DIAGNOSIS — M54.2 NECK PAIN: Primary | ICD-10-CM

## 2019-04-10 DIAGNOSIS — R51.9 ACUTE NONINTRACTABLE HEADACHE, UNSPECIFIED HEADACHE TYPE: ICD-10-CM

## 2019-04-10 DIAGNOSIS — R51.9 RIGHT SIDED FACIAL PAIN: ICD-10-CM

## 2019-04-10 PROCEDURE — 99204 PR OFFICE/OUTPT VISIT, NEW, LEVL IV, 45-59 MIN: ICD-10-PCS | Mod: S$GLB,,, | Performed by: NURSE PRACTITIONER

## 2019-04-10 PROCEDURE — 99999 PR PBB SHADOW E&M-EST. PATIENT-LVL IV: CPT | Mod: PBBFAC,,, | Performed by: NURSE PRACTITIONER

## 2019-04-10 PROCEDURE — 99204 OFFICE O/P NEW MOD 45 MIN: CPT | Mod: S$GLB,,, | Performed by: NURSE PRACTITIONER

## 2019-04-10 PROCEDURE — 99999 PR PBB SHADOW E&M-EST. PATIENT-LVL IV: ICD-10-PCS | Mod: PBBFAC,,, | Performed by: NURSE PRACTITIONER

## 2019-04-10 PROCEDURE — 3008F PR BODY MASS INDEX (BMI) DOCUMENTED: ICD-10-PCS | Mod: CPTII,S$GLB,, | Performed by: NURSE PRACTITIONER

## 2019-04-10 PROCEDURE — 3008F BODY MASS INDEX DOCD: CPT | Mod: CPTII,S$GLB,, | Performed by: NURSE PRACTITIONER

## 2019-04-10 RX ORDER — METHYLPREDNISOLONE 4 MG/1
TABLET ORAL
Qty: 1 PACKAGE | Refills: 0 | Status: SHIPPED | OUTPATIENT
Start: 2019-04-10 | End: 2019-05-01

## 2019-04-10 NOTE — PROGRESS NOTES
"REFERRING PROVIDER: Dr. Strange    REASON FOR CONSULT: Headaches     63/F without hx of migraines.  Began having vision blurred 2/2019 and saw optometrist and no change in  Vision, was normal exam. She stopped her methotrexate at that time but head pain has persisted. After initial vision change, she began with R sided headache and had 2 transient episodes of vision loss OD. Continues to have daily R sided head pain, dull or pressure, lasting hours, often waking her up from sleep same time. She takes 2 excedrin or fioricet and may return to sleep and awakens HA free. She does not feel right and has no energy/feels depleted. Entire R face and behind ear (scar plastic surgery felt same way after surgery but it got better over time) and lateral neck feels sore. HA describes as retro-orbital R and behind ear typically, but few times has awakened with band feeling around head. Denies associated nausea, vomiting, blurred vision, neck tightness, or photo/phonophobia. But does feel nauseated throughout day and has to force herself to eat. She does not drink ETOH. Denies recent head injury, recent major life stressor or fall or fever or infection.  Denies unilateral conjunctival injection or tearing or eye droop or nasal drainage. Denies pain is shocklike or electric sensation.     ESR/CRP normal  CMP and CBC normal   3/22/19 MRI w/o contrast unremarkable      FH: dad stroke  SH: , art gallery recent closed    HIT-6 score=68      ROS: Denies double vision. Sleeps otherwise undisturbed.     PHYSICAL EXAM:   General: W/D, W/N, well groomed  BP 94/60   Pulse 64   Ht 5' 4" (1.626 m)   Wt 53.3 kg (117 lb 8.1 oz)   LMP 01/15/2016   BMI 20.17 kg/m²   Neurological: Awake, alert, oriented x 3. Speech fluent, no dysarthria. Good attention span. Good fund of knowledge.   CN II-XII- pupils equal, no ptosis, nystagmus, EOM palsy. No facial asymmetry or facial sensory loss. Good hearing bilaterally. Good shoulder shrug, " palatal elevation, tongue protrusion bilaterally.   Motor: 5/5 strength, normal tone/bulk in all extremities. Full range of motion  Sensory: Intact to light touch upper and lower extremities. No occipital, bilateral STA or cervical paraspinal muscle tenderness. Mild R occipital tenderness and R neck   Gait: Normal   Coordination: Good FTNT, Heel to shin      IMPRESSION:   R sided facial pain radiating to neck, ongoing x 2 mos, atypical  RA    PLAN   Medrol dose krystin to help break HA/pain cycle, if unimproved consider anti-epileptic drug  MRI w and w/o contrast and MRA assess lesion/mass, aneurysm, AVV, arterial dissection, RCVS  CXR assess unilateral mass  Repeat inflammatory markers have been normal, less likely giant cell arteritis    Continue fioricet or excedrin. Discussed overuse medication headaches  Discuss with  Dr. Strange about resuming MTX   RTC accordingly, if I am out of town when studies resulted will have covering provider send via [employer]

## 2019-04-10 NOTE — LETTER
April 10, 2019      Jacqui Strange MD  [address]  [address]  [facility] [address]           [facility] [place] [address] [facility] [address]  [facility] [address]  Phone: [phone]  Fax: [phone]          Patient: Celeste Mohr   MR Number: [id number]   YOB: 1955   Date of Visit: 4/10/2019       Dear Dr. Jacqui Strange:    Thank you for referring Celeste Mohr to me for evaluation. Attached you will find relevant portions of my assessment and plan of care.    If you have questions, please do not hesitate to call me. I look forward to following Celeste Mohr along with you.    Sincerely,    Kami Farmer, NP    Enclosure  CC:  No Recipients    If you would like to receive this communication electronically, please contact [email or web address] or [phone] to request more information on [employer] access.    For providers and/or their staff who would like to refer a patient to [facility], please contact us through our one-stop-shop provider referral line, [facility], at [phone].    If you feel you have received this communication in error or would no longer like to receive these types of communications, please e-mail [email or web address]

## 2019-04-11 ENCOUNTER — CLINICAL SUPPORT (OUTPATIENT)
Dept: CARDIOLOGY | Facility: CLINIC | Age: 64
End: 2019-04-11
Attending: NURSE PRACTITIONER
Payer: COMMERCIAL

## 2019-04-11 ENCOUNTER — TELEPHONE (OUTPATIENT)
Dept: SLEEP MEDICINE | Facility: CLINIC | Age: 64
End: 2019-04-11

## 2019-04-11 ENCOUNTER — HOSPITAL ENCOUNTER (OUTPATIENT)
Dept: RADIOLOGY | Facility: HOSPITAL | Age: 64
Discharge: HOME | End: 2019-04-11
Attending: NURSE PRACTITIONER
Payer: COMMERCIAL

## 2019-04-11 DIAGNOSIS — R51.9 ACUTE NONINTRACTABLE HEADACHE, UNSPECIFIED HEADACHE TYPE: ICD-10-CM

## 2019-04-11 DIAGNOSIS — M54.2 NECK PAIN: ICD-10-CM

## 2019-04-11 PROCEDURE — 25500020 PHARM REV CODE 255: Performed by: NURSE PRACTITIONER

## 2019-04-11 PROCEDURE — 70544 MRA BRAIN WITHOUT CONTRAST: ICD-10-PCS | Mod: 26,59,, | Performed by: RADIOLOGY

## 2019-04-11 PROCEDURE — 93882 CV US CAROTID RIGHT (CUPID ONLY): ICD-10-PCS | Mod: RT,S$GLB,, | Performed by: INTERNAL MEDICINE

## 2019-04-11 PROCEDURE — A9585 GADOBUTROL INJECTION: HCPCS | Performed by: NURSE PRACTITIONER

## 2019-04-11 PROCEDURE — 70553 MRI BRAIN STEM W/O & W/DYE: CPT | Mod: TC

## 2019-04-11 PROCEDURE — 93882 EXTRACRANIAL UNI/LTD STUDY: CPT | Mod: RT,S$GLB,, | Performed by: INTERNAL MEDICINE

## 2019-04-11 PROCEDURE — 70544 MR ANGIOGRAPHY HEAD W/O DYE: CPT | Mod: TC

## 2019-04-11 PROCEDURE — 70553 MRI BRAIN STEM W/O & W/DYE: CPT | Mod: 26,,, | Performed by: RADIOLOGY

## 2019-04-11 PROCEDURE — 70553 MRI BRAIN W WO CONTRAST: ICD-10-PCS | Mod: 26,,, | Performed by: RADIOLOGY

## 2019-04-11 PROCEDURE — 99999 PR PBB SHADOW E&M-EST. PATIENT-LVL I: CPT | Mod: PBBFAC,,,

## 2019-04-11 PROCEDURE — 70544 MR ANGIOGRAPHY HEAD W/O DYE: CPT | Mod: 26,59,, | Performed by: RADIOLOGY

## 2019-04-11 PROCEDURE — 99999 PR PBB SHADOW E&M-EST. PATIENT-LVL I: ICD-10-PCS | Mod: PBBFAC,,,

## 2019-04-11 RX ORDER — GADOBUTROL 604.72 MG/ML
6 INJECTION INTRAVENOUS
Status: COMPLETED | OUTPATIENT
Start: 2019-04-11 | End: 2019-04-11

## 2019-04-11 RX ADMIN — GADOBUTROL 6 ML: 604.72 INJECTION INTRAVENOUS at 06:04

## 2019-04-11 NOTE — TELEPHONE ENCOUNTER
----- Message from Estefany Murillo sent at 4/11/2019 12:37 PM CDT -----  Contact: Pt.  Name of Who is Calling:LAUREL DELAROSA [[id number]]    What is the request in detail: patient would like a call back regarding steroids , patient needs to know when to start Please contact to further discuss and advise    Can the clinic reply by [facility]:     What Number to Call Back if not in [facility]: [phone]

## 2019-04-12 ENCOUNTER — TELEPHONE (OUTPATIENT)
Dept: NEUROLOGY | Facility: CLINIC | Age: 64
End: 2019-04-12

## 2019-04-12 NOTE — TELEPHONE ENCOUNTER
----- Message from Tyesha Zelaya sent at 4/12/2019 12:00 PM CDT -----  Contact: pt  Name of Who is Calling: Celeste       What is the request in detail: requesting a  Call back in reference to getting her MRI results back today. Pt does not wish to go the whole weekend without knowing the information. Please call pt and advise      Can the clinic reply by [facility]: yes    What Number to Call Back [facility] not [facility]: [phone]

## 2019-04-12 NOTE — TELEPHONE ENCOUNTER
Advised patient that the MRI/MRA scan of the brain was normal and she has [facility]

## 2019-04-14 LAB
OHS CV CAROTID RIGHT ICA EDV HIGHEST: 31
OHS CV CAROTID ULTRASOUND RIGHT ICA/CCA RATIO: 1.09
OHS CV PV CAROTID RIGHT HIGHEST CCA: 76
OHS CV PV CAROTID RIGHT HIGHEST ICA: 83
RIGHT CBA DIAS: 21 CM/S
RIGHT CBA SYS: 47 CM/S
RIGHT CCA DIST DIAS: 24 CM/S
RIGHT CCA DIST SYS: 75 CM/S
RIGHT CCA MID DIAS: 22 CM/S
RIGHT CCA MID SYS: 71 CM/S
RIGHT CCA PROX DIAS: 22 CM/S
RIGHT CCA PROX SYS: 76 CM/S
RIGHT ECA DIAS: 16 CM/S
RIGHT ECA SYS: 79 CM/S
RIGHT ICA DIST DIAS: 31 CM/S
RIGHT ICA DIST SYS: 78 CM/S
RIGHT ICA MID DIAS: 26 CM/S
RIGHT ICA MID SYS: 64 CM/S
RIGHT ICA PROX DIAS: 19 CM/S
RIGHT ICA PROX SYS: 83 CM/S
RIGHT VERTEBRAL DIAS: 20 CM/S
RIGHT VERTEBRAL SYS: 50 CM/S

## 2019-04-15 ENCOUNTER — TELEPHONE (OUTPATIENT)
Dept: RHEUMATOLOGY | Facility: CLINIC | Age: 64
End: 2019-04-15

## 2019-04-15 DIAGNOSIS — M06.9 RHEUMATOID ARTHRITIS INVOLVING MULTIPLE JOINTS: Primary | ICD-10-CM

## 2019-04-15 RX ORDER — METHOTREXATE 2.5 MG/1
TABLET ORAL
Qty: 32 TABLET | Refills: 0 | Status: SHIPPED | OUTPATIENT
Start: 2019-04-15 | End: 2019-08-22 | Stop reason: SDUPTHER

## 2019-04-15 NOTE — TELEPHONE ENCOUNTER
Please tell patient she can restart the methotrexate. I sent in new prescription for her to take. She is to take 4 pills a week for first 2 weeks and then increase to 6 pills once a week.   Also tell her to make sure she is taking the folic acid and to do labs in a month.     Called and informed patient to restart mtx.  Patient given dosing instructions on taking mtx, and told to take folic acid every day.  Patient verbalizes understanding.  Lab appointment scheduled.

## 2019-04-17 ENCOUNTER — TELEPHONE (OUTPATIENT)
Dept: SLEEP MEDICINE | Facility: CLINIC | Age: 64
End: 2019-04-17

## 2019-04-17 RX ORDER — NORTRIPTYLINE HYDROCHLORIDE 10 MG/1
10-20 CAPSULE ORAL NIGHTLY
Qty: 60 CAPSULE | Refills: 2 | Status: SHIPPED | OUTPATIENT
Start: 2019-04-17 | End: 2020-02-17

## 2019-04-17 NOTE — PROGRESS NOTES
Still having HA and taking frequent otc which she should stop and begin nortriptyline 10mg x 2 wks and if unimproved take 20mg with 6-8 wk f/u appt.

## 2019-04-17 NOTE — TELEPHONE ENCOUNTER
----- Message from Tyesha Zelaya sent at 4/17/2019  8:36 AM CDT -----  Contact: pt  Name of Who is Calling: Celeste      What is the request in detail: reuesting a call back in reference to getting her Us results from 4/11. Pt states that she is finished with the steroids and it really didn't help at all she is till having the headaches. Please call and advise      Can the clinic reply by [facility]: yes      What Number to Call Back [facility] not [facility]: [phone]

## 2019-04-17 NOTE — TELEPHONE ENCOUNTER
----- Message from Kristy Russell sent at 4/17/2019  1:03 PM CDT -----  Contact: Self  Type:  Patient Returning Call    Who Called: LAUREL DELAROSA [[id number]]    Who Left Message for Patient: Per Pt, Kami Farmer NP    Does the patient know what this is regarding?: Yes, Discuss medicatons    Best Call Back Number: [phone]    Additional Information: None

## 2019-04-26 ENCOUNTER — HOSPITAL ENCOUNTER (OUTPATIENT)
Dept: RADIOLOGY | Facility: OTHER | Age: 64
Discharge: HOME | End: 2019-04-26
Attending: NURSE PRACTITIONER
Payer: COMMERCIAL

## 2019-04-26 DIAGNOSIS — R51.9 ACUTE NONINTRACTABLE HEADACHE, UNSPECIFIED HEADACHE TYPE: ICD-10-CM

## 2019-04-26 PROCEDURE — 71046 X-RAY EXAM CHEST 2 VIEWS: CPT | Mod: TC,FY

## 2019-04-26 PROCEDURE — 71046 XR CHEST PA AND LATERAL: ICD-10-PCS | Mod: 26,,, | Performed by: RADIOLOGY

## 2019-04-26 PROCEDURE — 71046 X-RAY EXAM CHEST 2 VIEWS: CPT | Mod: 26,,, | Performed by: RADIOLOGY

## 2019-05-03 ENCOUNTER — LAB VISIT (OUTPATIENT)
Dept: LAB | Facility: OTHER | Age: 64
End: 2019-05-03
Payer: COMMERCIAL

## 2019-05-03 DIAGNOSIS — M06.9 RHEUMATOID ARTHRITIS INVOLVING MULTIPLE JOINTS: ICD-10-CM

## 2019-05-03 LAB
ALBUMIN SERPL BCP-MCNC: 4.1 G/DL (ref 3.5–5.2)
ALP SERPL-CCNC: 99 U/L (ref 55–135)
ALT SERPL W/O P-5'-P-CCNC: 21 U/L (ref 10–44)
ANION GAP SERPL CALC-SCNC: 8 MMOL/L (ref 8–16)
AST SERPL-CCNC: 19 U/L (ref 10–40)
BASOPHILS # BLD AUTO: 0.02 K/UL (ref 0–0.2)
BASOPHILS NFR BLD: 0.4 % (ref 0–1.9)
BILIRUB SERPL-MCNC: 0.4 MG/DL (ref 0.1–1)
BUN SERPL-MCNC: 19 MG/DL (ref 8–23)
CALCIUM SERPL-MCNC: 9.2 MG/DL (ref 8.7–10.5)
CHLORIDE SERPL-SCNC: 106 MMOL/L (ref 95–110)
CO2 SERPL-SCNC: 27 MMOL/L (ref 23–29)
CREAT SERPL-MCNC: 0.7 MG/DL (ref 0.5–1.4)
CRP SERPL-MCNC: 0.8 MG/L (ref 0–8.2)
DIFFERENTIAL METHOD: ABNORMAL
EOSINOPHIL # BLD AUTO: 0.1 K/UL (ref 0–0.5)
EOSINOPHIL NFR BLD: 1.6 % (ref 0–8)
ERYTHROCYTE [DISTWIDTH] IN BLOOD BY AUTOMATED COUNT: 12.6 % (ref 11.5–14.5)
ERYTHROCYTE [SEDIMENTATION RATE] IN BLOOD: 4 MM/HR (ref 0–20)
EST. GFR  (AFRICAN AMERICAN): >60 ML/MIN/1.73 M^2
EST. GFR  (NON AFRICAN AMERICAN): >60 ML/MIN/1.73 M^2
GLUCOSE SERPL-MCNC: 83 MG/DL (ref 70–110)
HCT VFR BLD AUTO: 36.9 % (ref 37–48.5)
HGB BLD-MCNC: 12 G/DL (ref 12–16)
LYMPHOCYTES # BLD AUTO: 1.2 K/UL (ref 1–4.8)
LYMPHOCYTES NFR BLD: 22.1 % (ref 18–48)
MCH RBC QN AUTO: 30.9 PG (ref 27–31)
MCHC RBC AUTO-ENTMCNC: 32.5 G/DL (ref 32–36)
MCV RBC AUTO: 95 FL (ref 82–98)
MONOCYTES # BLD AUTO: 0.4 K/UL (ref 0.3–1)
MONOCYTES NFR BLD: 8 % (ref 4–15)
NEUTROPHILS # BLD AUTO: 3.7 K/UL (ref 1.8–7.7)
NEUTROPHILS NFR BLD: 67.5 % (ref 38–73)
PLATELET # BLD AUTO: 239 K/UL (ref 150–350)
PMV BLD AUTO: 10.1 FL (ref 9.2–12.9)
POTASSIUM SERPL-SCNC: 4.1 MMOL/L (ref 3.5–5.1)
PROT SERPL-MCNC: 6.5 G/DL (ref 6–8.4)
RBC # BLD AUTO: 3.88 M/UL (ref 4–5.4)
SODIUM SERPL-SCNC: 141 MMOL/L (ref 136–145)
WBC # BLD AUTO: 5.47 K/UL (ref 3.9–12.7)

## 2019-05-03 PROCEDURE — 80053 COMPREHEN METABOLIC PANEL: CPT

## 2019-05-03 PROCEDURE — 85025 COMPLETE CBC W/AUTO DIFF WBC: CPT

## 2019-05-03 PROCEDURE — 36415 COLL VENOUS BLD VENIPUNCTURE: CPT

## 2019-05-03 PROCEDURE — 85651 RBC SED RATE NONAUTOMATED: CPT

## 2019-05-03 PROCEDURE — 86140 C-REACTIVE PROTEIN: CPT

## 2019-05-08 ENCOUNTER — TELEPHONE (OUTPATIENT)
Dept: RHEUMATOLOGY | Facility: CLINIC | Age: 64
End: 2019-05-08

## 2019-05-08 DIAGNOSIS — Z79.631 ENCOUNTER FOR METHOTREXATE MONITORING: Primary | ICD-10-CM

## 2019-05-08 DIAGNOSIS — Z51.81 ENCOUNTER FOR METHOTREXATE MONITORING: Primary | ICD-10-CM

## 2019-05-08 RX ORDER — METHOTREXATE 2.5 MG/1
20 TABLET ORAL
Qty: 96 TABLET | Refills: 0 | Status: SHIPPED | OUTPATIENT
Start: 2019-05-08 | End: 2019-08-22 | Stop reason: CLARIF

## 2019-05-08 NOTE — TELEPHONE ENCOUNTER
----- Message from Shruti Nair MA sent at 5/8/2019  3:28 PM CDT -----  Contact: pt      ----- Message -----  From: Enmanuel Lezama  Sent: 5/8/2019   1:42 PM  To: Alexandr Osman Staff    Pt would like to be called back regarding changing her appointment and lab results    Pt can be reached at [phone]

## 2019-05-08 NOTE — TELEPHONE ENCOUNTER
----- Message from Jacqui Strange MD sent at 5/8/2019  3:36 PM CDT -----  Contact: pt  Please tell patient and tell her that her labs look good so she can increase methotrexate from 6 pills a week to 8 pills a week and will need labs in a month.  Orders for labs placed and new script sent.  If she wants to reschedule, that's up to what you see available. Let her know I am leaving soon due to maternity.    Dr. Strange  ----- Message -----  From: Shruti Nair MA  Sent: 5/8/2019   3:28 PM  To: Jacqui Strange MD        ----- Message -----  From: Enmanuel Lezama  Sent: 5/8/2019   1:42 PM  To: Alexandr Osman Staff    Pt would like to be called back regarding changing her appointment and lab results    Pt can be reached at [phone]

## 2019-05-09 ENCOUNTER — TELEPHONE (OUTPATIENT)
Dept: RHEUMATOLOGY | Facility: CLINIC | Age: 64
End: 2019-05-09

## 2019-05-24 ENCOUNTER — TELEPHONE (OUTPATIENT)
Dept: SLEEP MEDICINE | Facility: CLINIC | Age: 64
End: 2019-05-24

## 2019-05-24 NOTE — TELEPHONE ENCOUNTER
----- Message from Ofelia Peralta sent at 5/24/2019  1:56 PM CDT -----  Contact: Pt   Name of Who is Calling: LAUREL DELAROSA [[id number]]      What is the request in detail: Patient is requesting a call from staff to be fit in for an appointment on next week. Please contact to further discuss and advise      Can the clinic reply by [facility]: No       What Number to Call Back if not in [facility]: [phone]

## 2019-05-26 ENCOUNTER — OFFICE VISIT (OUTPATIENT)
Dept: URGENT CARE | Facility: CLINIC | Age: 64
End: 2019-05-26
Payer: COMMERCIAL

## 2019-05-26 VITALS
SYSTOLIC BLOOD PRESSURE: 90 MMHG | BODY MASS INDEX: 19.97 KG/M2 | RESPIRATION RATE: 18 BRPM | HEART RATE: 80 BPM | HEIGHT: 64 IN | WEIGHT: 117 LBS | TEMPERATURE: 99 F | DIASTOLIC BLOOD PRESSURE: 66 MMHG | OXYGEN SATURATION: 97 %

## 2019-05-26 DIAGNOSIS — J01.00 ACUTE NON-RECURRENT MAXILLARY SINUSITIS: Primary | ICD-10-CM

## 2019-05-26 DIAGNOSIS — H10.33 ACUTE BACTERIAL CONJUNCTIVITIS OF BOTH EYES: ICD-10-CM

## 2019-05-26 DIAGNOSIS — J02.9 SORE THROAT: ICD-10-CM

## 2019-05-26 LAB
CTP QC/QA: YES
S PYO RRNA THROAT QL PROBE: NEGATIVE

## 2019-05-26 PROCEDURE — 3008F BODY MASS INDEX DOCD: CPT | Mod: CPTII,S$GLB,, | Performed by: SURGERY

## 2019-05-26 PROCEDURE — 96372 PR INJECTION,THERAP/PROPH/DIAG2ST, IM OR SUBCUT: ICD-10-PCS | Mod: S$GLB,,, | Performed by: SURGERY

## 2019-05-26 PROCEDURE — 96372 THER/PROPH/DIAG INJ SC/IM: CPT | Mod: S$GLB,,, | Performed by: SURGERY

## 2019-05-26 PROCEDURE — 87880 POCT RAPID STREP A: ICD-10-PCS | Mod: QW,S$GLB,, | Performed by: SURGERY

## 2019-05-26 PROCEDURE — 3008F PR BODY MASS INDEX (BMI) DOCUMENTED: ICD-10-PCS | Mod: CPTII,S$GLB,, | Performed by: SURGERY

## 2019-05-26 PROCEDURE — 99214 PR OFFICE/OUTPT VISIT, EST, LEVL IV, 30-39 MIN: ICD-10-PCS | Mod: 25,S$GLB,, | Performed by: SURGERY

## 2019-05-26 PROCEDURE — 99214 OFFICE O/P EST MOD 30 MIN: CPT | Mod: 25,S$GLB,, | Performed by: SURGERY

## 2019-05-26 PROCEDURE — 87880 STREP A ASSAY W/OPTIC: CPT | Mod: QW,S$GLB,, | Performed by: SURGERY

## 2019-05-26 RX ORDER — AMOXICILLIN AND CLAVULANATE POTASSIUM 875; 125 MG/1; MG/1
1 TABLET, FILM COATED ORAL 2 TIMES DAILY
Qty: 14 TABLET | Refills: 0 | Status: SHIPPED | OUTPATIENT
Start: 2019-05-26 | End: 2019-06-04 | Stop reason: SDUPTHER

## 2019-05-26 RX ORDER — AZELASTINE 1 MG/ML
2 SPRAY, METERED NASAL 2 TIMES DAILY
Qty: 30 ML | Refills: 0 | Status: SHIPPED | OUTPATIENT
Start: 2019-05-26 | End: 2021-02-22

## 2019-05-26 RX ORDER — BETAMETHASONE SODIUM PHOSPHATE AND BETAMETHASONE ACETATE 3; 3 MG/ML; MG/ML
6 INJECTION, SUSPENSION INTRA-ARTICULAR; INTRALESIONAL; INTRAMUSCULAR; SOFT TISSUE
Status: COMPLETED | OUTPATIENT
Start: 2019-05-26 | End: 2019-05-26

## 2019-05-26 RX ORDER — BENZONATATE 200 MG/1
200 CAPSULE ORAL 3 TIMES DAILY PRN
Qty: 21 CAPSULE | Refills: 0 | Status: SHIPPED | OUTPATIENT
Start: 2019-05-26 | End: 2019-06-02

## 2019-05-26 RX ORDER — POLYMYXIN B SULFATE AND TRIMETHOPRIM 1; 10000 MG/ML; [USP'U]/ML
1 SOLUTION OPHTHALMIC EVERY 4 HOURS
Qty: 1 BOTTLE | Refills: 0 | Status: SHIPPED | OUTPATIENT
Start: 2019-05-26 | End: 2019-06-04

## 2019-05-26 RX ADMIN — BETAMETHASONE SODIUM PHOSPHATE AND BETAMETHASONE ACETATE 6 MG: 3; 3 INJECTION, SUSPENSION INTRA-ARTICULAR; INTRALESIONAL; INTRAMUSCULAR; SOFT TISSUE at 11:05

## 2019-05-26 NOTE — PATIENT INSTRUCTIONS
What Is Conjunctivitis?    Conjunctivitis is an irritation or infection. It affects the membrane that covers the white of your eye and the inside of your eyelid (conjunctiva). It can happen to one or both eyes. The membrane swells and the blood vessels enlarge (dilate). This makes your eye red. That's why conjunctivitis is sometimes called red eye or pink eye.  What are the symptoms?  If you have one or more of these symptoms, see an eye doctor:  · Redness in and around your eye  · Eyes that are puffy and sore  · Itching, burning, or stinging eyes  · Watery eyes or discharge from your eye  · Eyelids that are crusty or stuck together when you wake up in the morning  · Pink color in the whites of one or both eyes  Getting treatment quickly can help prevent damage to your eyes.  How is it diagnosed?  Conjunctivitis is usually a minor eye infection. But it can sometimes become a more serious problem. Some more serious eye diseases have symptoms that look like conjunctivitis. So it's important for an eye doctor to diagnose you. Your eye doctor will ask about your symptoms and any medicines you take. He or she will ask about any illnesses or medical conditions you may have. The doctor will also check your eyes with a hand-held light and a special microscope called a slit lamp.  Date Last Reviewed: 6/11/2015  © 7976-2598 [employer]. [address] [place] [address], [place] [address]. All rights reserved. This information is not intended as a substitute for professional medical care. Always follow your healthcare professional's instructions.        Acute Sinusitis    Acute sinusitis is irritation and swelling of the sinuses. It is usually caused by a viral infection after a common cold. Your doctor can help you find relief.  What is acute sinusitis?  Sinuses are air-filled spaces in the skull behind the face. They are kept moist and clean by a lining of mucosa. Things such as pollen, smoke, and chemical fumes can  irritate the mucosa. It can then swell up. As a response to irritation, the mucosa makes more mucus and other fluids. Tiny hairlike cilia cover the mucosa. Cilia help carry mucus toward the opening of the sinus. Too much mucus may cause the cilia to stop working. This blocks the sinus opening. A buildup of fluid in the sinuses then causes pain and pressure. It can also encourage bacteria to grow in the sinuses.  Common symptoms of acute sinusitis  You may have:  · Facial soreness pain  · Headache  · Fever  · Fluid draining in the back of the throat (postnasal drip)  · Congestion  · Drainage that is thick and colored, instead of clear  · Cough  Diagnosing acute sinusitis  Your doctor will ask about your symptoms and health history. He or she will look at your ear, nose, and throat. You usually won't need to have X-rays taken.    The doctor may take a sample of mucus to check for bacteria. If you have sinusitis that keeps coming back, you may need imaging tests such as X-rays or CAT scans. This will help your doctor check for a structural problem that may be causing the infection.  Treating acute sinusitis  Treatment is aimed at unblocking the sinus opening and helping the cilia work again. You may need to take antihistamine and decongestant medicine. These can reduce inflammation and decrease the amount of fluid your sinuses make. If you have a bacterial infection, you will need to take antibiotic medicine for 10 to 14 days. Take this medicine until it is gone, even if you feel better.  Date Last Reviewed: 10/1/2016  © 0509-2590 [employer]. [address] [place] [address], [place] [address]. All rights reserved. This information is not intended as a substitute for professional medical care. Always follow your healthcare professional's instructions.

## 2019-05-26 NOTE — PROGRESS NOTES
"Subjective:       Patient ID: Celeste Mohr is a 63 y.o. female.    Vitals:  height is 5' 4" (1.626 m) and weight is 53.1 kg (117 lb). Her temperature is 98.7 °F (37.1 °C). Her blood pressure is 90/66 and her pulse is 80. Her respiration is 18 and oxygen saturation is 97%.     Chief Complaint: Sore Throat    Pt states that she first notice her symptoms on Thursday and gradually worsening .     Sore Throat    This is a new problem. The current episode started in the past 7 days. The problem has been gradually worsening. Neither side of throat is experiencing more pain than the other. There has been no fever. The pain is at a severity of 8/10. The pain is moderate. Associated symptoms include coughing, headaches and trouble swallowing. Pertinent negatives include no congestion, ear pain, shortness of breath, stridor or vomiting. She has tried gargles for the symptoms. The treatment provided mild relief.       Constitution: Positive for fatigue. Negative for chills, sweating and fever.   HENT: Positive for sore throat, trouble swallowing and voice change. Negative for ear pain, congestion, sinus pain and sinus pressure.    Neck: Negative for painful lymph nodes.   Eyes: Positive for eye redness.   Respiratory: Positive for cough and sputum production. Negative for chest tightness, bloody sputum, COPD, shortness of breath, stridor, wheezing and asthma.    Gastrointestinal: Negative for nausea and vomiting.   Musculoskeletal: Positive for muscle ache.   Skin: Negative for rash.   Allergic/Immunologic: Negative for seasonal allergies and asthma.   Neurological: Positive for headaches.   Hematologic/Lymphatic: Negative for swollen lymph nodes.       Objective:      Physical Exam   Constitutional: She is oriented to person, place, and time. She appears well-developed and well-nourished. She is cooperative.  Non-toxic appearance. She does not appear ill. No distress.   HENT:   Head: Normocephalic and atraumatic.   Right " Ear: Hearing, tympanic membrane, external ear and ear canal normal.   Left Ear: Hearing, tympanic membrane, external ear and ear canal normal.   Nose: Mucosal edema and rhinorrhea present. No nasal deformity. No epistaxis. Right sinus exhibits maxillary sinus tenderness. Right sinus exhibits no frontal sinus tenderness. Left sinus exhibits maxillary sinus tenderness. Left sinus exhibits no frontal sinus tenderness.   Mouth/Throat: Uvula is midline and mucous membranes are normal. No trismus in the jaw. Normal dentition. No uvula swelling. Posterior oropharyngeal edema and posterior oropharyngeal erythema present.   Eyes: Lids are normal. Right eye exhibits exudate. Left eye exhibits exudate. Right conjunctiva is injected. Left conjunctiva is injected. No scleral icterus.   Sclera clear bilat   Neck: Trachea normal, full passive range of motion without pain and phonation normal. Neck supple.   Cardiovascular: Normal rate, regular rhythm, normal heart sounds, intact distal pulses and normal pulses.   Pulmonary/Chest: Effort normal and breath sounds normal. No respiratory distress.   Abdominal: Soft. Normal appearance and bowel sounds are normal. She exhibits no distension. There is no tenderness.   Musculoskeletal: Normal range of motion. She exhibits no edema or deformity.   Neurological: She is alert and oriented to person, place, and time. She exhibits normal muscle tone. Coordination normal.   Skin: Skin is warm, dry and intact. She is not diaphoretic. No pallor.   Psychiatric: She has a normal mood and affect. Her speech is normal and behavior is normal. Judgment and thought content normal. Cognition and memory are normal.   Nursing note and vitals reviewed.      Assessment:       1. Sore throat    2. Acute non-recurrent maxillary sinusitis    3. Acute bacterial conjunctivitis of both eyes        Plan:         Sore throat  -     POCT rapid strep A    Acute non-recurrent maxillary sinusitis  -     betamethasone  acetate-betamethasone sodium phosphate injection 6 mg  -     amoxicillin-clavulanate 875-125mg (AUGMENTIN) 875-125 mg per tablet; Take 1 tablet by mouth 2 (two) times daily. for 7 days  Dispense: 14 tablet; Refill: 0  -     azelastine (ASTELIN) 137 mcg (0.1 %) nasal spray; 2 sprays (274 mcg total) by Nasal route 2 (two) times daily.  Dispense: 30 mL; Refill: 0  -     benzonatate (TESSALON) 200 MG capsule; Take 1 capsule (200 mg total) by mouth 3 (three) times daily as needed for Cough.  Dispense: 21 capsule; Refill: 0  -     loratadine-pseudoephedrine  mg (CLARITIN-D 24 HOUR)  mg per 24 hr tablet; Take 1 tablet by mouth daily as needed for Allergies (congestion, stuffy nose).  Dispense: 30 tablet; Refill: 0    Acute bacterial conjunctivitis of both eyes  -     polymyxin B sulf-trimethoprim (POLYTRIM) 10,000 unit- 1 mg/mL Drop; Place 1 drop into both eyes every 4 (four) hours.  Dispense: 1 Bottle; Refill: 0      Results for orders placed or performed in visit on 05/26/19   POCT rapid strep A   Result Value Ref Range    Rapid Strep A Screen Negative Negative     Acceptable Yes        Patient Instructions     What Is Conjunctivitis?    Conjunctivitis is an irritation or infection. It affects the membrane that covers the white of your eye and the inside of your eyelid (conjunctiva). It can happen to one or both eyes. The membrane swells and the blood vessels enlarge (dilate). This makes your eye red. That's why conjunctivitis is sometimes called red eye or pink eye.  What are the symptoms?  If you have one or more of these symptoms, see an eye doctor:  · Redness in and around your eye  · Eyes that are puffy and sore  · Itching, burning, or stinging eyes  · Watery eyes or discharge from your eye  · Eyelids that are crusty or stuck together when you wake up in the morning  · Pink color in the whites of one or both eyes  Getting treatment quickly can help prevent damage to your eyes.  How is it  diagnosed?  Conjunctivitis is usually a minor eye infection. But it can sometimes become a more serious problem. Some more serious eye diseases have symptoms that look like conjunctivitis. So it's important for an eye doctor to diagnose you. Your eye doctor will ask about your symptoms and any medicines you take. He or she will ask about any illnesses or medical conditions you may have. The doctor will also check your eyes with a hand-held light and a special microscope called a slit lamp.  Date Last Reviewed: 6/11/2015 © 2000-2017 [employer]. [address] [place] [address]. All rights reserved. This information is not intended as a substitute for professional medical care. Always follow your healthcare professional's instructions.        Acute Sinusitis    Acute sinusitis is irritation and swelling of the sinuses. It is usually caused by a viral infection after a common cold. Your doctor can help you find relief.  What is acute sinusitis?  Sinuses are air-filled spaces in the skull behind the face. They are kept moist and clean by a lining of mucosa. Things such as pollen, smoke, and chemical fumes can irritate the mucosa. It can then swell up. As a response to irritation, the mucosa makes more mucus and other fluids. Tiny hairlike cilia cover the mucosa. Cilia help carry mucus toward the opening of the sinus. Too much mucus may cause the cilia to stop working. This blocks the sinus opening. A buildup of fluid in the sinuses then causes pain and pressure. It can also encourage bacteria to grow in the sinuses.  Common symptoms of acute sinusitis  You may have:  · Facial soreness pain  · Headache  · Fever  · Fluid draining in the back of the throat (postnasal drip)  · Congestion  · Drainage that is thick and colored, instead of clear  · Cough  Diagnosing acute sinusitis  Your doctor will ask about your symptoms and health history. He or she will look at your ear, nose, and throat. You  usually won't need to have X-rays taken.    The doctor may take a sample of mucus to check for bacteria. If you have sinusitis that keeps coming back, you may need imaging tests such as X-rays or CAT scans. This will help your doctor check for a structural problem that may be causing the infection.  Treating acute sinusitis  Treatment is aimed at unblocking the sinus opening and helping the cilia work again. You may need to take antihistamine and decongestant medicine. These can reduce inflammation and decrease the amount of fluid your sinuses make. If you have a bacterial infection, you will need to take antibiotic medicine for 10 to 14 days. Take this medicine until it is gone, even if you feel better.  Date Last Reviewed: 10/1/2016  © 9175-7504 [employer]. [address] [place] [address], [place] [address]. All rights reserved. This information is not intended as a substitute for professional medical care. Always follow your healthcare professional's instructions.

## 2019-05-28 ENCOUNTER — TELEPHONE (OUTPATIENT)
Dept: SLEEP MEDICINE | Facility: CLINIC | Age: 64
End: 2019-05-28

## 2019-05-28 NOTE — TELEPHONE ENCOUNTER
----- Message from Cynthia Pearson sent at 5/28/2019  4:07 PM CDT -----  Contact: self  Pt called to speak with nurse to amisha appt pt wannted a sooner appt than the one I offer.        Pt callback number [phone]

## 2019-06-04 ENCOUNTER — TELEPHONE (OUTPATIENT)
Dept: URGENT CARE | Facility: CLINIC | Age: 64
End: 2019-06-04

## 2019-06-04 ENCOUNTER — TELEPHONE (OUTPATIENT)
Dept: OPTOMETRY | Facility: CLINIC | Age: 64
End: 2019-06-04

## 2019-06-04 DIAGNOSIS — H10.33 ACUTE BACTERIAL CONJUNCTIVITIS OF BOTH EYES: Primary | ICD-10-CM

## 2019-06-04 DIAGNOSIS — J01.00 ACUTE NON-RECURRENT MAXILLARY SINUSITIS: ICD-10-CM

## 2019-06-04 RX ORDER — OFLOXACIN 3 MG/ML
1 SOLUTION/ DROPS OPHTHALMIC 4 TIMES DAILY
Qty: 10 ML | Refills: 0 | Status: SHIPPED | OUTPATIENT
Start: 2019-06-04 | End: 2019-06-11

## 2019-06-04 RX ORDER — AMOXICILLIN AND CLAVULANATE POTASSIUM 875; 125 MG/1; MG/1
1 TABLET, FILM COATED ORAL 2 TIMES DAILY
Qty: 14 TABLET | Refills: 0 | Status: SHIPPED | OUTPATIENT
Start: 2019-06-04 | End: 2019-06-11

## 2019-06-04 NOTE — PROGRESS NOTES
Pt with a recurrence of her sinus and conjunctivitis symptoms within a day of finishing the antibiotics. She called and asked for a refill as her mother is in the hospital. We will do that but she was advised to return if symptoms persist.

## 2019-06-04 NOTE — TELEPHONE ENCOUNTER
----- Message from RT Kia sent at 6/4/2019 10:29 AM CDT -----  Pt called asking for antibiotics, she said she was here Sunday 26th and she was diagnosed with conjunctivitis, antibiotics were given, she took antibiotics for 7 days and she felt better for 1 day and now symptoms are back, today her eyes were crusty when she woke up. Pt request to send antibiotics to the pharmacy

## 2019-06-04 NOTE — TELEPHONE ENCOUNTER
----- Message from Roland Oates, [place] sent at 6/4/2019  9:30 AM CDT -----  Contact: Celeste  It would be a good idea to see her. Not sure what she is using and it can be contagious. Also recommend Ocusoft lid wipes to treat eyelids that are stuck together.   ----- Message -----  From: Jenny Dueñas  Sent: 6/4/2019   9:12 AM  To: Roland Oates, OD     Patient conjunctivitis cleared up and then woke up yesterday and eyes were stuck together.  Using antibiotic drops again every 4 hrs. But wants to know if it's contagious and if she needs to be seen.  ----- Message -----  From: Alize Cisneros  Sent: 6/4/2019   8:23 AM  To: Иван Rodriguez    Ms. Mohr went to urgent care for conjunctivitis on May 28 and it has returned she would like for you contact her because it has returned. She can be reached at [phone]. She says that she has use the medication for 7 days.

## 2019-06-04 NOTE — TELEPHONE ENCOUNTER
Pt called asking for antibiotics, she was diagnosed with conjunctivitis May 26th and she got better for 1 day but her symptoms are back, today patient complains of crusty eyes. After talking to provider on duty, Dr. Teixeira is going to send antibiotics to the pharmacy and eye drops as well

## 2019-06-16 ENCOUNTER — OFFICE VISIT (OUTPATIENT)
Dept: URGENT CARE | Facility: CLINIC | Age: 64
End: 2019-06-16
Payer: COMMERCIAL

## 2019-06-16 VITALS
OXYGEN SATURATION: 98 % | RESPIRATION RATE: 18 BRPM | DIASTOLIC BLOOD PRESSURE: 62 MMHG | SYSTOLIC BLOOD PRESSURE: 92 MMHG | HEART RATE: 83 BPM | WEIGHT: 117 LBS | HEIGHT: 64 IN | TEMPERATURE: 98 F | BODY MASS INDEX: 19.97 KG/M2

## 2019-06-16 DIAGNOSIS — J30.9 ALLERGIC RHINITIS, UNSPECIFIED SEASONALITY, UNSPECIFIED TRIGGER: ICD-10-CM

## 2019-06-16 DIAGNOSIS — J06.9 VIRAL URI: Primary | ICD-10-CM

## 2019-06-16 PROCEDURE — 3008F PR BODY MASS INDEX (BMI) DOCUMENTED: ICD-10-PCS | Mod: CPTII,S$GLB,, | Performed by: FAMILY MEDICINE

## 2019-06-16 PROCEDURE — 99214 OFFICE O/P EST MOD 30 MIN: CPT | Mod: 25,S$GLB,, | Performed by: FAMILY MEDICINE

## 2019-06-16 PROCEDURE — 96372 PR INJECTION,THERAP/PROPH/DIAG2ST, IM OR SUBCUT: ICD-10-PCS | Mod: S$GLB,,, | Performed by: FAMILY MEDICINE

## 2019-06-16 PROCEDURE — 96372 THER/PROPH/DIAG INJ SC/IM: CPT | Mod: S$GLB,,, | Performed by: FAMILY MEDICINE

## 2019-06-16 PROCEDURE — 99214 PR OFFICE/OUTPT VISIT, EST, LEVL IV, 30-39 MIN: ICD-10-PCS | Mod: 25,S$GLB,, | Performed by: FAMILY MEDICINE

## 2019-06-16 PROCEDURE — 3008F BODY MASS INDEX DOCD: CPT | Mod: CPTII,S$GLB,, | Performed by: FAMILY MEDICINE

## 2019-06-16 RX ORDER — FLUTICASONE PROPIONATE 50 MCG
2 SPRAY, SUSPENSION (ML) NASAL DAILY
Qty: 1 BOTTLE | Refills: 8 | Status: SHIPPED | OUTPATIENT
Start: 2019-06-16

## 2019-06-16 RX ORDER — BETAMETHASONE SODIUM PHOSPHATE AND BETAMETHASONE ACETATE 3; 3 MG/ML; MG/ML
6 INJECTION, SUSPENSION INTRA-ARTICULAR; INTRALESIONAL; INTRAMUSCULAR; SOFT TISSUE ONCE
Status: COMPLETED | OUTPATIENT
Start: 2019-06-16 | End: 2019-06-16

## 2019-06-16 RX ORDER — BENZONATATE 200 MG/1
200 CAPSULE ORAL 3 TIMES DAILY PRN
Qty: 50 CAPSULE | Refills: 0 | Status: SHIPPED | OUTPATIENT
Start: 2019-06-16 | End: 2019-06-26

## 2019-06-16 RX ADMIN — BETAMETHASONE SODIUM PHOSPHATE AND BETAMETHASONE ACETATE 6 MG: 3; 3 INJECTION, SUSPENSION INTRA-ARTICULAR; INTRALESIONAL; INTRAMUSCULAR; SOFT TISSUE at 03:06

## 2019-06-16 NOTE — PATIENT INSTRUCTIONS
Understanding Nasal Allergies  Nasal allergies (also called allergic rhinitis) are a common health problem. They may be seasonal. This means they cause symptoms only at certain times of the year. Or they may be perennial. This means they cause symptoms all year long. Other health problems, such as asthma, often occur along with allergies as well.    What is an allergic reaction?  An allergy is a reaction to a substance called an allergen. Common allergens include:  · Wind-borne pollen  · Mold  · Dust mites  · Furry and feathered animals  · Cockroaches  Normally, allergens are harmless. But when a person has allergies, the body thinks they are harmful. The body then attacks allergens with antibodies. Antibodies are attached to special cells called mast cells. Allergens stick to the antibodies. This makes the mast cells release histamine and other chemicals. This is an allergic reaction. The chemicals irritate nearby nasal tissue. This causes nasal allergy symptoms.  Common nasal allergy symptoms  Allergies can cause nasal tissue to swell. This makes the air passages smaller. The nose may feel stuffed up. The nose may also make extra mucus, which can plug the nasal passages or drip out of the nose. Mucus can drip down the back of the throat (postnasal drip) as well. Sinus tissue can swell. This may cause pain and headache. Common allergy symptoms include:  · Runny nose with clear, watery discharge  · Stuffy nose (nasal congestion)  · Drainage down your throat (postnasal drip)  · Sneezing  · Red, watery eyes  · Itchy nose, eyes, ears, and throat  · Plugged-up ears (ear congestion)  · Sore throat  · Coughing  · Sinus pain and swelling  · Headache  It may not be allergies  Other health problems can cause symptoms like those of nasal allergies. These include:  · Nonallergic rhinitis and viruses such as colds  · Irritants and pollutants, such as strong odors or smoke  · Certain medicines  · Changes in the weather    Treatment  Your healthcare provider will evaluate you to find the cause of your symptoms then recommend treatment. If your symptoms are due to nasal allergies, your healthcare provider may prescribe nasal steroid sprays or oral antihistamines to help reduce symptoms. Avoidance of the allergen will also be suggested. You may also be referred to an allergist.   Date Last Reviewed: 10/1/2016  © 3368-5668 [employer]. [address] [place] [address], [place] [address]. All rights reserved. This information is not intended as a substitute for professional medical care. Always follow your healthcare professional's instructions.      TRY USING CLARITIN DAILY AS NEEDED.    TRY USING THE FLONASE ON A REGULAR BASIS DURING DIFFICULT TIMES TO KEEP YOU FROM HAVING TO COME IN FOR A STEROID SHOT OR PILLS OR FROM DEVELOPING A SINUSITIS OR EAR INFECTION.      Make sure that you follow up with your primary care doctor in the next 2-5 days if needed .  Return to the Urgent Care if signs or symptoms change and certainly if you have worsening symptoms go to the nearest emergency department for further evaluation.

## 2019-06-16 NOTE — PROGRESS NOTES
"Subjective:       Patient ID: Celeste Mohr is a 63 y.o. female.    Vitals:  height is 5' 4" (1.626 m) and weight is 53.1 kg (117 lb). Her tympanic temperature is 97.9 °F (36.6 °C). Her blood pressure is 92/62 and her pulse is 83. Her respiration is 18 and oxygen saturation is 98%.     Chief Complaint: Sinus Problem    64YO female seen initially 05/26/2019 for sinusitis and conjunctivitis, treated with Augmentin and Tessalon Perles and eye drops and Astelin spray and Claritin D. She took the Augmentin for 7 days, then had called back 06/04/2019 requesting refill, as her symptoms had returned as soon as she stopped the antibiotic.  She took another 7 days of Augmentin.  Nasal drainage is no longer thick and green, now clear.  Still however with pressure in her sinuses and ears and not feeling herself.  Nonsmoker.    Sinus Problem   This is a recurrent problem. The current episode started in the past 7 days. The problem has been gradually worsening since onset. There has been no fever. Her pain is at a severity of 2/10. The pain is mild. Associated symptoms include congestion, coughing, headaches and sinus pressure. Pertinent negatives include no chills, diaphoresis, ear pain, neck pain, shortness of breath, sneezing, sore throat or swollen glands. Treatments tried: claritin and hot tea. The treatment provided no relief.       Constitution: Negative for chills, sweating, fatigue and fever.   HENT: Positive for congestion, postnasal drip, sinus pain and sinus pressure. Negative for ear pain, sore throat, trouble swallowing and voice change.    Neck: Negative for neck pain and painful lymph nodes.   Eyes: Negative for eye redness.   Respiratory: Positive for cough. Negative for chest tightness, sputum production, bloody sputum, COPD, shortness of breath, stridor, wheezing and asthma.    Gastrointestinal: Negative for nausea and vomiting.   Musculoskeletal: Negative for muscle ache.   Skin: Negative for rash. "   Allergic/Immunologic: Negative for seasonal allergies, asthma and sneezing.   Neurological: Positive for headaches.   Hematologic/Lymphatic: Negative for swollen lymph nodes.       Objective:      Physical Exam   Constitutional: She is oriented to person, place, and time. She appears well-developed and well-nourished. She is cooperative.  Non-toxic appearance. She does not appear ill. No distress.   HENT:   Head: Normocephalic and atraumatic.   Right Ear: Hearing, tympanic membrane, external ear and ear canal normal.   Left Ear: Hearing, tympanic membrane, external ear and ear canal normal.   Nose: Nose normal. No mucosal edema, rhinorrhea or nasal deformity. No epistaxis. Right sinus exhibits no maxillary sinus tenderness and no frontal sinus tenderness. Left sinus exhibits no maxillary sinus tenderness and no frontal sinus tenderness.   Mouth/Throat: Uvula is midline, oropharynx is clear and moist and mucous membranes are normal. No trismus in the jaw. Normal dentition. No uvula swelling. No oropharyngeal exudate or posterior oropharyngeal erythema.   Sinuses nontender.   Eyes: Pupils are equal, round, and reactive to light. Conjunctivae, EOM and lids are normal. No scleral icterus.   Sclera clear bilat   Neck: Trachea normal, normal range of motion, full passive range of motion without pain and phonation normal. Neck supple.   Cardiovascular: Normal rate, regular rhythm, normal heart sounds, intact distal pulses and normal pulses.   Pulmonary/Chest: Effort normal and breath sounds normal. No stridor. No respiratory distress. She has no wheezes. She has no rales.   Abdominal: Normal appearance.   Musculoskeletal: Normal range of motion. She exhibits no edema or deformity.   Lymphadenopathy:     She has no cervical adenopathy.   Neurological: She is alert and oriented to person, place, and time. She exhibits normal muscle tone. Coordination normal.   Skin: Skin is warm, dry and intact. She is not diaphoretic. No  pallor.   Psychiatric: She has a normal mood and affect. Her speech is normal and behavior is normal. Judgment and thought content normal. Cognition and memory are normal.   Nursing note and vitals reviewed.      Assessment:       1. Viral URI    2. Allergic rhinitis, unspecified seasonality, unspecified trigger        Plan:         Viral URI  -     benzonatate (TESSALON) 200 MG capsule; Take 1 capsule (200 mg total) by mouth 3 (three) times daily as needed for Cough.  Dispense: 50 capsule; Refill: 0    Allergic rhinitis, unspecified seasonality, unspecified trigger  -     betamethasone acetate-betamethasone sodium phosphate injection 6 mg  -     fluticasone propionate (FLONASE) 50 mcg/actuation nasal spray; 2 sprays (100 mcg total) by Each Nare route once daily.  Dispense: 1 Bottle; Refill: 8    TRY USING CLARITIN DAILY AS NEEDED.    TRY USING THE FLONASE ON A REGULAR BASIS DURING DIFFICULT TIMES TO KEEP YOU FROM HAVING TO COME IN FOR A STEROID SHOT OR PILLS OR FROM DEVELOPING A SINUSITIS OR EAR INFECTION.      Make sure that you follow up with your primary care doctor in the next 2-5 days if needed .  Return to the Urgent Care if signs or symptoms change and certainly if you have worsening symptoms go to the nearest emergency department for further evaluation.

## 2019-06-20 ENCOUNTER — TELEPHONE (OUTPATIENT)
Dept: RHEUMATOLOGY | Facility: CLINIC | Age: 64
End: 2019-06-20

## 2019-06-20 NOTE — TELEPHONE ENCOUNTER
----- Message from Jacqui Strange MD sent at 6/20/2019 11:13 AM CDT -----  Please tell her I am going to give her break from labs and she can do labs end of July.  Dr. FAIRBANKS  ----- Message -----  From: Shruti Nair MA  Sent: 6/20/2019  10:21 AM  To: Jacqui Strange MD    This pt has missed her labs from yesterday can you please sign them so I can schedule them for her tomorrow morning, thanks Shruti

## 2019-06-20 NOTE — TELEPHONE ENCOUNTER
----- Message from Martínez Casillas sent at 6/20/2019  8:19 AM CDT -----  Contact: Self/ [phone]  Patient would like a call back to ask questions about her lab work that she missed on 6/18/19.

## 2019-07-10 ENCOUNTER — TELEPHONE (OUTPATIENT)
Dept: RHEUMATOLOGY | Facility: CLINIC | Age: 64
End: 2019-07-10

## 2019-07-10 NOTE — TELEPHONE ENCOUNTER
Telephone:  She stopped MTX in March , then slowly resumed  She has apprehensions about continuing ; says she is getting a lot of infections  She allows that it is controlling morning stiffness    I advised that there are alternatives but that will require f/u visit    She will keep scheduled lab appt    Please schedule a f/u visit with anyone next available for interval assessment of her RA

## 2019-07-10 NOTE — TELEPHONE ENCOUNTER
----- Message from Jalyn Stinson MA sent at 7/10/2019 10:28 AM CDT -----  Pt is taking 8 pills once a week, would like to wean off the MTX because she is feeling bad and feels like it is due to the MTX. She did labs at [facility] and will have the office fax over the results and will keep next lab appointment on the 31st.

## 2019-07-17 ENCOUNTER — TELEPHONE (OUTPATIENT)
Dept: RHEUMATOLOGY | Facility: CLINIC | Age: 64
End: 2019-07-17

## 2019-08-05 ENCOUNTER — TELEPHONE (OUTPATIENT)
Dept: RHEUMATOLOGY | Facility: CLINIC | Age: 64
End: 2019-08-05

## 2019-08-05 DIAGNOSIS — Z86.2 HISTORY OF LUPUS ANTICOAGULANT DISORDER: ICD-10-CM

## 2019-08-05 DIAGNOSIS — M06.9 RHEUMATOID ARTHRITIS, INVOLVING UNSPECIFIED SITE, UNSPECIFIED RHEUMATOID FACTOR PRESENCE: Primary | ICD-10-CM

## 2019-08-05 NOTE — PROGRESS NOTES
Labs ordered per Dr. Strange's last note for follow up visit.       Camila Fleming M.D.  Rheumatology, PGY 4

## 2019-08-06 ENCOUNTER — TELEPHONE (OUTPATIENT)
Dept: RHEUMATOLOGY | Facility: CLINIC | Age: 64
End: 2019-08-06

## 2019-08-21 NOTE — PROGRESS NOTES
Subjective:       Patient ID: Celeste Mohr is a 63 y.o. female.    Chief Complaint: Rheumatoid Arthritis and Joint Pain    HPI     This is a 63 year old female with PMH of hypothyroidism, pericardial effusion with negative work up, elevated APA IgM x 1 without history of clots or miscarriage, and bilateral symmetric arthritis consistent with seronegative RA. She was last seen in March 2019 by Dr. Strange and is currently on methotrexate 8 pills weekly. Currently, she complains of pain in her hands and feet without swelling, trouble wearing some of her rings, bilateral elbow pain, tingling in her fingers and one hour of morning stiffness. She feels that her pain is 50% improved on methotrexate.      Review of Systems   Constitutional: Negative for activity change, appetite change, chills, fatigue and fever.   Respiratory: Negative for apnea, cough, choking, chest tightness and shortness of breath.    Cardiovascular: Negative for chest pain, palpitations and leg swelling.   Gastrointestinal: Negative for abdominal distention, abdominal pain, anal bleeding, blood in stool, constipation, diarrhea, nausea and vomiting.   Genitourinary: Negative for dysuria and hematuria.   Musculoskeletal: Positive for arthralgias. Negative for back pain, gait problem, joint swelling, myalgias and neck pain.   Neurological: Negative for dizziness, weakness, numbness and headaches.         Objective:   BP 90/60   Pulse 72   Wt 53.3 kg (117 lb 8.1 oz)   LMP 01/15/2016   BMI 20.17 kg/m²      Physical Exam   Constitutional: She is well-developed, well-nourished, and in no distress. No distress.   HENT:   Head: Normocephalic and atraumatic.   Mouth/Throat: No oropharyngeal exudate.   Eyes: Conjunctivae are normal. Right eye exhibits no discharge. Left eye exhibits no discharge. No scleral icterus.   Neck: Normal range of motion. Neck supple. No thyromegaly present.   Cardiovascular: Normal rate, regular rhythm and normal heart  sounds.  Exam reveals no gallop and no friction rub.    No murmur heard.  Pulmonary/Chest: Effort normal and breath sounds normal. No respiratory distress. She has no wheezes. She has no rales. She exhibits no tenderness.   Abdominal: Soft. Bowel sounds are normal. She exhibits no distension and no mass. There is no tenderness. There is no rebound and no guarding.   Lymphadenopathy:     She has no cervical adenopathy.   Skin: Skin is warm and dry. No rash noted. She is not diaphoretic.     Musculoskeletal: She exhibits no edema, tenderness or deformity.   No small joint synovitis or large joint effusions        CBC (5/3/2019): Normal   ESR: 4  CMP: Normal     Results for LAUREL DELAROSA (MRN [id number]) as of 8/21/2019 10:21   Ref. Range 6/5/2018 08:39   MIMI Screen Latest Ref Range: Negative <1:160  Negative <1:160   Cytoplasmic Neutrophilic Ab Latest Ref Range: <1:20 Titer <1:20   Perinuclear (P-ANCA) Latest Ref Range: <1:20 Titer <1:20   Complement (C-3) Latest Ref Range: 50 - 180 mg/dL 84   Complement (C-4) Latest Ref Range: 11 - 44 mg/dL 19   CCP Antibodies Latest Ref Range: <5.0 U/mL <0.5   Rheumatoid Factor Latest Ref Range: 0.0 - 15.0 IU/mL <10.0     Arthritis Survey (6/5/2018)  FINDINGS:  Cervical spine lateral view in flexion: C5-6 and C6-7 show marked spondylitic change with loss of disc space height and bulky marginal osteophytes.  Otherwise vertebral body heights, alignment and disc spaces are preserved.  Prevertebral soft tissues and pre odontoid space appear unremarkable.    Both knees AP standing: There is mild spurring of intra-articular eminences.  Joint spaces and cortical margins appear preserved.  I detect no erosion or chondrocalcinosis.    Both hands PA: There is a subtle oblique lucency at the ulnar aspect of the proximal portion of the distal phalanx of the left ring finger compatible with minimally displaced fracture extending to the articular margin.  I detect no other evidence of fracture.   Joint spaces and cortical margins are otherwise preserved.  Ulnar styloids appear intact.  No erosion or chondrocalcinosis identified.    Both feet AP: There is bilateral hallux valgus with bunion formation.  Joint spaces and cortical margins appear preserved.  Lisfranc articulations appear congruent.  I detect no erosion or chondrocalcinosis.      Impression       I suspect acute or subacute nondisplaced fracture at the ulnar aspect of the proximal portion of the distal phalanx of the left ring finger extending to the articular surface.  Please correlate with physical examination.  I do not identify soft tissue swelling at this site.     DEXA (7/20/2018)  Impression       No evidence of significant bone density loss--there is a 13.4% risk of a major osteoporotic fracture and a 0.8% risk of hip fracture in the next 10 years (FRAX).       Assessment:       1. Rheumatoid arthritis, involving unspecified site, unspecified rheumatoid factor presence    2. History of lupus anticoagulant disorder    3. Encounter for methotrexate monitoring          Plan:       Ms. Mohr has a history of APA IgM positive x 1 without clots or miscarriages and polyarthritis consistent with seronegative RA. She still does complain of pain in her fingers and toes which she says is partially improved with methotrexate, but she has no detectable synovitis or joint effusions on physical exam.     - Continue methotrexate 2.5mg 8 tablets weekly + folate for now. Advised to hold MTX if she develops an infection that requires antibiotics.  - Request for anti cardiolipin Igm, IgG, IgA, Beta 2 glycoprotein antibodies, DRVVT, CRP, ESR, CBC, CMP, RF, and CCP ordered  - Pre-DMARD labs done 6/5/2018       Patient seen and discussed with Dr. Stewart     RTC in 3 months to see Dr. Alexandr Fleming M.D.  Rheumatology, PGY 4

## 2019-08-22 ENCOUNTER — OFFICE VISIT (OUTPATIENT)
Dept: RHEUMATOLOGY | Facility: CLINIC | Age: 64
End: 2019-08-22
Payer: COMMERCIAL

## 2019-08-22 VITALS
BODY MASS INDEX: 20.17 KG/M2 | DIASTOLIC BLOOD PRESSURE: 60 MMHG | HEART RATE: 72 BPM | SYSTOLIC BLOOD PRESSURE: 90 MMHG | WEIGHT: 117.5 LBS

## 2019-08-22 DIAGNOSIS — M06.9 RHEUMATOID ARTHRITIS, INVOLVING UNSPECIFIED SITE, UNSPECIFIED RHEUMATOID FACTOR PRESENCE: Primary | ICD-10-CM

## 2019-08-22 DIAGNOSIS — Z51.81 ENCOUNTER FOR METHOTREXATE MONITORING: ICD-10-CM

## 2019-08-22 DIAGNOSIS — Z79.631 ENCOUNTER FOR METHOTREXATE MONITORING: ICD-10-CM

## 2019-08-22 DIAGNOSIS — Z86.2 HISTORY OF LUPUS ANTICOAGULANT DISORDER: ICD-10-CM

## 2019-08-22 PROCEDURE — 99214 OFFICE O/P EST MOD 30 MIN: CPT | Mod: S$GLB,,, | Performed by: STUDENT IN AN ORGANIZED HEALTH CARE EDUCATION/TRAINING PROGRAM

## 2019-08-22 PROCEDURE — 99214 PR OFFICE/OUTPT VISIT, EST, LEVL IV, 30-39 MIN: ICD-10-PCS | Mod: S$GLB,,, | Performed by: STUDENT IN AN ORGANIZED HEALTH CARE EDUCATION/TRAINING PROGRAM

## 2019-08-22 PROCEDURE — 3008F PR BODY MASS INDEX (BMI) DOCUMENTED: ICD-10-PCS | Mod: CPTII,S$GLB,, | Performed by: STUDENT IN AN ORGANIZED HEALTH CARE EDUCATION/TRAINING PROGRAM

## 2019-08-22 PROCEDURE — 99999 PR PBB SHADOW E&M-EST. PATIENT-LVL III: CPT | Mod: PBBFAC,,, | Performed by: STUDENT IN AN ORGANIZED HEALTH CARE EDUCATION/TRAINING PROGRAM

## 2019-08-22 PROCEDURE — 99999 PR PBB SHADOW E&M-EST. PATIENT-LVL III: ICD-10-PCS | Mod: PBBFAC,,, | Performed by: STUDENT IN AN ORGANIZED HEALTH CARE EDUCATION/TRAINING PROGRAM

## 2019-08-22 PROCEDURE — 3008F BODY MASS INDEX DOCD: CPT | Mod: CPTII,S$GLB,, | Performed by: STUDENT IN AN ORGANIZED HEALTH CARE EDUCATION/TRAINING PROGRAM

## 2019-08-22 RX ORDER — METHOTREXATE 2.5 MG/1
20 TABLET ORAL
Qty: 32 TABLET | Refills: 0 | Status: SHIPPED | OUTPATIENT
Start: 2019-08-22 | End: 2019-10-22 | Stop reason: SDUPTHER

## 2019-08-22 NOTE — PROGRESS NOTES
Patient seen and examined with fellow.  All elements of history, physical exam and medical decision making independently confirmed by me.  Patient of Dr. Strange with seronegative RA on methotrexate.  No evidence of activity on exam.  Will check labs.  Patient to follow-up with Dr. Strange.  See note for details.

## 2019-08-23 ENCOUNTER — TELEPHONE (OUTPATIENT)
Dept: RHEUMATOLOGY | Facility: CLINIC | Age: 64
End: 2019-08-23

## 2019-08-23 NOTE — TELEPHONE ENCOUNTER
I spoke to Ms. Mohr regarding her blood tests results. RF, CCP, and inflammatory markers negative. Advised to continue current methotrexate dose until follow up with Dr Strange. She has a positive antiphospholipid IgM which is persistent from previous. The patient denies a history of blood clots and miscarriages. All questions answered.       Camila Fleming M.D.  Rheumatology, PGY 4

## 2019-10-01 ENCOUNTER — PATIENT MESSAGE (OUTPATIENT)
Dept: RHEUMATOLOGY | Facility: CLINIC | Age: 64
End: 2019-10-01

## 2019-10-01 ENCOUNTER — TELEPHONE (OUTPATIENT)
Dept: RHEUMATOLOGY | Facility: CLINIC | Age: 64
End: 2019-10-01

## 2019-10-01 NOTE — TELEPHONE ENCOUNTER
----- Message from Jacqui Strange MD sent at 10/1/2019 12:27 PM CDT -----   Hi  Please ask patient to send me message on my [facility] with question.  Tell her she is saying she is on antibiotics  To you but I dont see in the chart when she saw Dr. Stewart that she was on antibiotic s. Please clarify what is her question. Tell her you will give her appt and put her on cancellation list.      Dr. Strange  ----- Message -----  From: Shruti Nair MA  Sent: 10/1/2019  10:48 AM CDT  To: Jacqui Strange MD    Patient says that she has been on antibodies since June. She said she is also taking methotrexate. She wants to know what can she do she is feeling very sick, thanks

## 2019-10-01 NOTE — TELEPHONE ENCOUNTER
----- Message from Benita Hood sent at 10/1/2019 10:17 AM CDT -----  Contact: Pt.Self   Patient Requesting Sooner Appointment.     Reason for sooner appt.:  f/u     When is the first available appointment?  11/07/19 @ 8:00am    Communication Preference:  [phone]    Additional Information:    Thank You

## 2019-10-01 NOTE — TELEPHONE ENCOUNTER
Called patient to tell her to message Dr Strange on the [facility] portal

## 2019-10-02 RX ORDER — PREDNISONE 20 MG/1
20 TABLET ORAL DAILY
Qty: 10 TABLET | Refills: 0 | Status: SHIPPED | OUTPATIENT
Start: 2019-10-02 | End: 2019-10-12

## 2019-10-03 ENCOUNTER — TELEPHONE (OUTPATIENT)
Dept: RHEUMATOLOGY | Facility: CLINIC | Age: 64
End: 2019-10-03

## 2019-10-03 NOTE — TELEPHONE ENCOUNTER
Patient wanted to know if we had a sooner appointment than her 11/7 appointment. We did have one available so I scheduled it for her

## 2019-10-14 ENCOUNTER — TELEPHONE (OUTPATIENT)
Dept: RHEUMATOLOGY | Facility: CLINIC | Age: 64
End: 2019-10-14

## 2019-10-22 ENCOUNTER — OFFICE VISIT (OUTPATIENT)
Dept: RHEUMATOLOGY | Facility: CLINIC | Age: 64
End: 2019-10-22
Payer: COMMERCIAL

## 2019-10-22 VITALS
WEIGHT: 121.94 LBS | SYSTOLIC BLOOD PRESSURE: 93 MMHG | HEIGHT: 64 IN | DIASTOLIC BLOOD PRESSURE: 61 MMHG | BODY MASS INDEX: 20.82 KG/M2 | HEART RATE: 71 BPM

## 2019-10-22 DIAGNOSIS — R22.31 LOCALIZED SWELLING OF FINGER OF RIGHT HAND: Primary | ICD-10-CM

## 2019-10-22 DIAGNOSIS — Z51.81 ENCOUNTER FOR METHOTREXATE MONITORING: ICD-10-CM

## 2019-10-22 DIAGNOSIS — Z79.631 ENCOUNTER FOR METHOTREXATE MONITORING: ICD-10-CM

## 2019-10-22 DIAGNOSIS — M06.9 RHEUMATOID ARTHRITIS FLARE: ICD-10-CM

## 2019-10-22 PROCEDURE — 99215 OFFICE O/P EST HI 40 MIN: CPT | Mod: S$GLB,,, | Performed by: INTERNAL MEDICINE

## 2019-10-22 PROCEDURE — 99999 PR PBB SHADOW E&M-EST. PATIENT-LVL IV: CPT | Mod: PBBFAC,,, | Performed by: INTERNAL MEDICINE

## 2019-10-22 PROCEDURE — 3008F BODY MASS INDEX DOCD: CPT | Mod: CPTII,S$GLB,, | Performed by: INTERNAL MEDICINE

## 2019-10-22 PROCEDURE — 99215 PR OFFICE/OUTPT VISIT, EST, LEVL V, 40-54 MIN: ICD-10-PCS | Mod: S$GLB,,, | Performed by: INTERNAL MEDICINE

## 2019-10-22 PROCEDURE — 99999 PR PBB SHADOW E&M-EST. PATIENT-LVL IV: ICD-10-PCS | Mod: PBBFAC,,, | Performed by: INTERNAL MEDICINE

## 2019-10-22 PROCEDURE — 3008F PR BODY MASS INDEX (BMI) DOCUMENTED: ICD-10-PCS | Mod: CPTII,S$GLB,, | Performed by: INTERNAL MEDICINE

## 2019-10-22 RX ORDER — METHOTREXATE 2.5 MG/1
20 TABLET ORAL
Qty: 96 TABLET | Refills: 0 | Status: SHIPPED | OUTPATIENT
Start: 2019-10-22 | End: 2020-03-02

## 2019-10-22 RX ORDER — FOLIC ACID 1 MG/1
1 TABLET ORAL DAILY
Qty: 90 TABLET | Refills: 4 | Status: SHIPPED | OUTPATIENT
Start: 2019-10-22 | End: 2020-12-17

## 2019-10-22 NOTE — PROGRESS NOTES
Progress Notes        Subjective:       Patient ID: Celeste Mohr is a 62 y.o. female.     Chief Complaint: Disease Management     HPI      62 year old female with PMH of hypothyroidism, pericardial effusion here for evaluation. She was diagnosed with pericardial effusion January 19th.  Reports that the effusion was incidentally found on the chest xray. She reports she has pain in elbows,hands, and feet. Reports that she has been having joint pain for a couple of months. Pain level can be as high as 8/10, aching, non-radiating. Reports that her hands get swollen.  Denies fevers, hair loss, or photosensitivity. Denies raynauds, blood clot or miscarriage. Reports that she has stiffness in hands, ankles,elbows and knees.  Reports she has stiffness for 1.5 to 2 hours.   She started medrol pack with improvement in pain. Reports meloxicam did not help. Reports she has pain with opening jars.Quit smoking in 30s to 40s.  Denies chest pain or SOB.        Interval history:She is taking MTX 8  pills a week.  Denies any headaches. She has pain in right third finger and right second toe. She has pain in feet in morning. Reports that prednisone improved her pain.  Reports swelling in hands. She has trouble putting on rings due to swelling. Reports she has morning stiffness for an hour.            Reports that she has pain in right eye and right temple for a month.  Reports that she has decreased vision in right eye. Sometimes she feels like her right temple is also tender. She also feels foggy.   She also reports pins and needles in her head with turning. Denies pain with chewing. Denies new shoulder or hip pain.  Pain can be as high as 8/10, aching, non-radiating.           Family hx: negative for RA  Review of Systems   Constitutional: Positive for fatigue. Negative for activity change, appetite change, chills and diaphoresis.   HENT: Negative for congestion, ear discharge, ear pain, facial swelling, mouth sores, sinus  pressure, sneezing, sore throat, tinnitus and trouble swallowing.    Eyes: Negative for photophobia, pain, discharge, redness, itching and visual disturbance.   Respiratory: Negative for apnea, chest tightness, shortness of breath, wheezing and stridor.    Cardiovascular: Negative for leg swelling.   Gastrointestinal: Negative for abdominal distention, abdominal pain, anal bleeding, blood in stool, constipation, diarrhea and nausea.   Endocrine: Negative for cold intolerance and heat intolerance.   Genitourinary: Negative for difficulty urinating and dysuria.   Musculoskeletal: Positive for arthralgias and back pain. Negative for gait problem, joint swelling, myalgias, neck pain and neck stiffness.   Skin: Negative for color change, pallor, rash and wound.   Neurological: Negative for dizziness, seizures, light-headedness and numbness.   Hematological: Negative for adenopathy. Does not bruise/bleed easily.   Psychiatric/Behavioral: Negative for sleep disturbance. The patient is not nervous/anxious.              Objective:         Physical Exam   Constitutional: She is oriented to person, place, and time.   HENT:   Head: Normocephalic and atraumatic.   Right Ear: External ear normal.   Left Ear: External ear normal.   Nose: Nose normal.   Mouth/Throat: Oropharynx is clear and moist. No oropharyngeal exudate.   Eyes: Conjunctivae and EOM are normal. Pupils are equal, round, and reactive to light. Right eye exhibits no discharge. Left eye exhibits no discharge. No scleral icterus.   Neck: Neck supple. No JVD present. No thyromegaly present.   Cardiovascular: Normal rate, regular rhythm, normal heart sounds and intact distal pulses.  Exam reveals no gallop and no friction rub.    No murmur heard.  Pulmonary/Chest: Effort normal and breath sounds normal. No respiratory distress. She has no wheezes. She has no rales. She exhibits no tenderness.   Abdominal: Soft. Bowel sounds are normal. She exhibits no distension and no  mass. There is no tenderness. There is no rebound and no guarding.   Lymphadenopathy:     She has no cervical adenopathy.   Neurological: She is alert and oriented to person, place, and time. No cranial nerve deficit. Gait normal. Coordination normal.   Skin: Skin is dry. No rash noted. No erythema. No pallor.     Psychiatric: Affect and judgment normal.   Musculoskeletal: She exhibits tenderness. She exhibits no edema or deformity.   Tenderness of elbows on palpation (resolved)  Tenderness of mcps, pips (resolved)  Tenderness of both ankles (resolved)  Feet: tenderness of mtps  (resolved)            Labs: reviewed  Kacie-negative  Ccp,rf-negative   anticardiolipin Ig M-44  Beta-2 glycoprotein Ig M-130  LAC-negative        Arthritis survey (2018): I personally reviewed;I suspect acute or subacute nondisplaced fracture at the ulnar aspect of the proximal portion of the distal phalanx of the left ring finger extending to the articular surfac     Assessment:     63 year old female with PMH of hypothyroidism, pericardial effusion here for evaluation.  She presented with a bilateral symmetric arthritis consistent with seronegative RA.    1) RA: symptoms almost resolved on prednisone and returning with weaning off prednisone.Discussed various treatment options with patient.  Doing better on MTX  8 pills a week but developed new pain in right hand so will get MRI to evaluate for synovitis.  Continue folic acid 1mg po qday  Labs  Mri right hand       2) pericardial effusion: reports history of pericardial effusion.  Work up negative.        3) abnormal APS antibodies: denies history of  clotting or miscarriage.  Can consider plaquenil given anti-thrombotic effects.     4) discuss dexa scan history at next visit

## 2019-11-04 ENCOUNTER — TELEPHONE (OUTPATIENT)
Dept: RHEUMATOLOGY | Facility: CLINIC | Age: 64
End: 2019-11-04

## 2019-11-04 NOTE — TELEPHONE ENCOUNTER
----- Message from Jacqui Strange MD sent at 11/4/2019 12:12 PM CST -----  Contact: Self   Hi  Tell her for thyroid, she has to go through primary care.    Dr. FAIRBANKS  ----- Message -----  From: Shruti Nair MA  Sent: 11/1/2019   1:28 PM CST  To: Jacqui Strange MD     Hey do you want her to have labs for her thyroids? Thanks   ----- Message -----  From: Donna Pastor  Sent: 11/1/2019   1:07 PM CDT  To: Alexandr Osman Staff    Pt would like to know if the labs she is having done check for thyroid levels as well.     Pt can be reached @ [phone]

## 2019-11-19 ENCOUNTER — LAB VISIT (OUTPATIENT)
Dept: LAB | Facility: OTHER | Age: 64
End: 2019-11-19
Attending: INTERNAL MEDICINE
Payer: COMMERCIAL

## 2019-11-19 ENCOUNTER — TELEPHONE (OUTPATIENT)
Dept: OPTOMETRY | Facility: CLINIC | Age: 64
End: 2019-11-19

## 2019-11-19 DIAGNOSIS — R22.31 LOCALIZED SWELLING OF FINGER OF RIGHT HAND: ICD-10-CM

## 2019-11-19 LAB
ALBUMIN SERPL BCP-MCNC: 3.9 G/DL (ref 3.5–5.2)
ALP SERPL-CCNC: 74 U/L (ref 55–135)
ALT SERPL W/O P-5'-P-CCNC: 23 U/L (ref 10–44)
ANION GAP SERPL CALC-SCNC: 8 MMOL/L (ref 8–16)
AST SERPL-CCNC: 19 U/L (ref 10–40)
BASOPHILS # BLD AUTO: 0.04 K/UL (ref 0–0.2)
BASOPHILS NFR BLD: 1 % (ref 0–1.9)
BILIRUB SERPL-MCNC: 0.3 MG/DL (ref 0.1–1)
BUN SERPL-MCNC: 17 MG/DL (ref 8–23)
CALCIUM SERPL-MCNC: 9.1 MG/DL (ref 8.7–10.5)
CHLORIDE SERPL-SCNC: 109 MMOL/L (ref 95–110)
CO2 SERPL-SCNC: 26 MMOL/L (ref 23–29)
CREAT SERPL-MCNC: 0.8 MG/DL (ref 0.5–1.4)
CRP SERPL-MCNC: 0.3 MG/L (ref 0–8.2)
DIFFERENTIAL METHOD: ABNORMAL
EOSINOPHIL # BLD AUTO: 0.2 K/UL (ref 0–0.5)
EOSINOPHIL NFR BLD: 4.1 % (ref 0–8)
ERYTHROCYTE [DISTWIDTH] IN BLOOD BY AUTOMATED COUNT: 12.4 % (ref 11.5–14.5)
ERYTHROCYTE [SEDIMENTATION RATE] IN BLOOD: 5 MM/HR (ref 0–20)
EST. GFR  (AFRICAN AMERICAN): >60 ML/MIN/1.73 M^2
EST. GFR  (NON AFRICAN AMERICAN): >60 ML/MIN/1.73 M^2
GLUCOSE SERPL-MCNC: 96 MG/DL (ref 70–110)
HCT VFR BLD AUTO: 37.2 % (ref 37–48.5)
HGB BLD-MCNC: 11.7 G/DL (ref 12–16)
IMM GRANULOCYTES # BLD AUTO: 0.02 K/UL (ref 0–0.04)
IMM GRANULOCYTES NFR BLD AUTO: 0.5 % (ref 0–0.5)
LYMPHOCYTES # BLD AUTO: 1.1 K/UL (ref 1–4.8)
LYMPHOCYTES NFR BLD: 26 % (ref 18–48)
MCH RBC QN AUTO: 30 PG (ref 27–31)
MCHC RBC AUTO-ENTMCNC: 31.5 G/DL (ref 32–36)
MCV RBC AUTO: 95 FL (ref 82–98)
MONOCYTES # BLD AUTO: 0.4 K/UL (ref 0.3–1)
MONOCYTES NFR BLD: 10.7 % (ref 4–15)
NEUTROPHILS # BLD AUTO: 2.4 K/UL (ref 1.8–7.7)
NEUTROPHILS NFR BLD: 57.7 % (ref 38–73)
NRBC BLD-RTO: 0 /100 WBC
PLATELET # BLD AUTO: 224 K/UL (ref 150–350)
PMV BLD AUTO: 10.5 FL (ref 9.2–12.9)
POTASSIUM SERPL-SCNC: 4.4 MMOL/L (ref 3.5–5.1)
PROT SERPL-MCNC: 6.2 G/DL (ref 6–8.4)
RBC # BLD AUTO: 3.9 M/UL (ref 4–5.4)
SODIUM SERPL-SCNC: 143 MMOL/L (ref 136–145)
WBC # BLD AUTO: 4.12 K/UL (ref 3.9–12.7)

## 2019-11-19 PROCEDURE — 85651 RBC SED RATE NONAUTOMATED: CPT

## 2019-11-19 PROCEDURE — 36415 COLL VENOUS BLD VENIPUNCTURE: CPT

## 2019-11-19 PROCEDURE — 85025 COMPLETE CBC W/AUTO DIFF WBC: CPT

## 2019-11-19 PROCEDURE — 80053 COMPREHEN METABOLIC PANEL: CPT

## 2019-11-19 PROCEDURE — 86140 C-REACTIVE PROTEIN: CPT

## 2019-11-19 PROCEDURE — 86480 TB TEST CELL IMMUN MEASURE: CPT

## 2019-11-19 NOTE — TELEPHONE ENCOUNTER
----- Message from Charley Min sent at 11/19/2019 11:29 AM CST -----  Contact: Patient  Staff Message     Caller name: Pt    Reason for call: Pt wants to know if she needs a new prescription for her glasses or if she can use her old one.        Communication Preference: [phone]    Additional Information:

## 2019-11-20 ENCOUNTER — PATIENT MESSAGE (OUTPATIENT)
Dept: RHEUMATOLOGY | Facility: CLINIC | Age: 64
End: 2019-11-20

## 2019-11-20 DIAGNOSIS — M06.9 RHEUMATOID ARTHRITIS INVOLVING MULTIPLE JOINTS: Primary | ICD-10-CM

## 2019-11-21 ENCOUNTER — TELEPHONE (OUTPATIENT)
Dept: OPTOMETRY | Facility: CLINIC | Age: 64
End: 2019-11-21

## 2019-11-21 ENCOUNTER — TELEPHONE (OUTPATIENT)
Dept: OBSTETRICS AND GYNECOLOGY | Facility: CLINIC | Age: 64
End: 2019-11-21

## 2019-11-21 LAB
GAMMA INTERFERON BACKGROUND BLD IA-ACNC: 0.04 IU/ML
M TB IFN-G CD4+ BCKGRND COR BLD-ACNC: -0.01 IU/ML
MITOGEN IGNF BCKGRD COR BLD-ACNC: 1.95 IU/ML
TB GOLD PLUS: NEGATIVE
TB2 - NIL: -0.01 IU/ML

## 2019-11-21 NOTE — TELEPHONE ENCOUNTER
----- Message from Jenny Farfan sent at 11/21/2019  9:40 AM CST -----  Contact: Celeste Mccord called to f/u on getting rx for eyeglasses updated. [phone]

## 2019-11-21 NOTE — TELEPHONE ENCOUNTER
Yuri pt, says she's been feeling overall not well. Has been feeling anxious, can't sleep, her body doesn't feel right and thinks it may be a hormone imbalance. Pt doesn't know if she needs to do lab work first or come in to see MD? Please call pt and advise, thank you.

## 2019-11-21 NOTE — TELEPHONE ENCOUNTER
Pt wants to come in to discuss HRT.  Scheduled 12/19  C/o anxiety, insomnia and doesn't feel right.

## 2019-12-16 ENCOUNTER — OFFICE VISIT (OUTPATIENT)
Dept: OPTOMETRY | Facility: CLINIC | Age: 64
End: 2019-12-16
Payer: COMMERCIAL

## 2019-12-16 DIAGNOSIS — H52.4 HYPEROPIA WITH PRESBYOPIA, BILATERAL: ICD-10-CM

## 2019-12-16 DIAGNOSIS — H04.123 BILATERAL DRY EYES: Primary | ICD-10-CM

## 2019-12-16 DIAGNOSIS — Z46.0 FITTING AND ADJUSTMENT OF SPECTACLES AND CONTACT LENSES: Primary | ICD-10-CM

## 2019-12-16 DIAGNOSIS — H52.03 HYPEROPIA WITH PRESBYOPIA, BILATERAL: ICD-10-CM

## 2019-12-16 PROCEDURE — 99999 PR PBB SHADOW E&M-EST. PATIENT-LVL II: ICD-10-PCS | Mod: PBBFAC,,, | Performed by: OPTOMETRIST

## 2019-12-16 PROCEDURE — 99999 PR PBB SHADOW E&M-EST. PATIENT-LVL I: ICD-10-PCS | Mod: PBBFAC,,, | Performed by: OPTOMETRIST

## 2019-12-16 PROCEDURE — 92015 DETERMINE REFRACTIVE STATE: CPT | Mod: S$GLB,,, | Performed by: OPTOMETRIST

## 2019-12-16 PROCEDURE — 92015 PR REFRACTION: ICD-10-PCS | Mod: S$GLB,,, | Performed by: OPTOMETRIST

## 2019-12-16 PROCEDURE — 92012 INTRM OPH EXAM EST PATIENT: CPT | Mod: S$GLB,,, | Performed by: OPTOMETRIST

## 2019-12-16 PROCEDURE — 99999 PR PBB SHADOW E&M-EST. PATIENT-LVL II: CPT | Mod: PBBFAC,,, | Performed by: OPTOMETRIST

## 2019-12-16 PROCEDURE — 92012 PR EYE EXAM, EST PATIENT,INTERMED: ICD-10-PCS | Mod: S$GLB,,, | Performed by: OPTOMETRIST

## 2019-12-16 PROCEDURE — 92310 CONTACT LENS FITTING OU: CPT | Mod: CSM,,, | Performed by: OPTOMETRIST

## 2019-12-16 PROCEDURE — 92310 PR CONTACT LENS FITTING (NO CHANGE): ICD-10-PCS | Mod: CSM,,, | Performed by: OPTOMETRIST

## 2019-12-16 PROCEDURE — 99999 PR PBB SHADOW E&M-EST. PATIENT-LVL I: CPT | Mod: PBBFAC,,, | Performed by: OPTOMETRIST

## 2019-12-16 NOTE — PROGRESS NOTES
GITA: 2/14/19     Pt is here for a refraction and contact lens rx updated.   She wears the contact for a few hours and then has a lot of dryness/discomfort.   Not using any drops at home.   (-)pain, (-) redness, (-) discharge        Assessment /Plan     For exam results, see Encounter Report.    Bilateral dry eyes    Hyperopia with presbyopia, bilateral      1. Recommend artificial tears. Use with contact lenses. 1 drop 2x per day. Chronicity of disease and treatment discussed.  2. New Spec Rx given and  Contact lens trials ordered for pt. Daily wear only advised, with education to risks of extended wear.  Discussed lens care, compliance and solutions.  RTC 2 weeks contact lens follow up. . Different lens options discussed with patient. RTC 1 year full exam.  Addend 12/28/20 extended clrx

## 2019-12-19 ENCOUNTER — OFFICE VISIT (OUTPATIENT)
Dept: OBSTETRICS AND GYNECOLOGY | Facility: CLINIC | Age: 64
End: 2019-12-19
Attending: OBSTETRICS & GYNECOLOGY
Payer: COMMERCIAL

## 2019-12-19 VITALS
HEIGHT: 64 IN | BODY MASS INDEX: 20.01 KG/M2 | WEIGHT: 117.19 LBS | DIASTOLIC BLOOD PRESSURE: 60 MMHG | SYSTOLIC BLOOD PRESSURE: 98 MMHG

## 2019-12-19 DIAGNOSIS — Z12.4 ENCOUNTER FOR SCREENING FOR CERVICAL CANCER: ICD-10-CM

## 2019-12-19 DIAGNOSIS — Z12.31 ENCOUNTER FOR SCREENING MAMMOGRAM FOR BREAST CANCER: ICD-10-CM

## 2019-12-19 DIAGNOSIS — Z11.51 ENCOUNTER FOR SCREENING FOR HUMAN PAPILLOMAVIRUS (HPV): ICD-10-CM

## 2019-12-19 DIAGNOSIS — Z01.419 ENCOUNTER FOR GYNECOLOGICAL EXAMINATION: Primary | ICD-10-CM

## 2019-12-19 PROCEDURE — 88175 CYTOPATH C/V AUTO FLUID REDO: CPT

## 2019-12-19 PROCEDURE — 99999 PR PBB SHADOW E&M-EST. PATIENT-LVL III: ICD-10-PCS | Mod: PBBFAC,,, | Performed by: OBSTETRICS & GYNECOLOGY

## 2019-12-19 PROCEDURE — 87624 HPV HI-RISK TYP POOLED RSLT: CPT

## 2019-12-19 PROCEDURE — 99999 PR PBB SHADOW E&M-EST. PATIENT-LVL III: CPT | Mod: PBBFAC,,, | Performed by: OBSTETRICS & GYNECOLOGY

## 2019-12-19 PROCEDURE — 99396 PREV VISIT EST AGE 40-64: CPT | Mod: S$GLB,,, | Performed by: OBSTETRICS & GYNECOLOGY

## 2019-12-19 PROCEDURE — 99396 PR PREVENTIVE VISIT,EST,40-64: ICD-10-PCS | Mod: S$GLB,,, | Performed by: OBSTETRICS & GYNECOLOGY

## 2019-12-19 RX ORDER — AZITHROMYCIN 250 MG/1
TABLET, FILM COATED ORAL
Refills: 0 | COMMUNITY
Start: 2019-12-10 | End: 2020-06-01

## 2019-12-19 RX ORDER — ARIPIPRAZOLE 2 MG/1
TABLET ORAL
Refills: 11 | COMMUNITY
Start: 2019-11-01 | End: 2020-06-01

## 2019-12-19 RX ORDER — LITHIUM CARBONATE 150 MG/1
CAPSULE ORAL
Refills: 11 | COMMUNITY
Start: 2019-11-15 | End: 2020-06-01

## 2019-12-24 NOTE — PROGRESS NOTES
"CC: Well woman exam    Celeste Mohr is a 64 y.o. female  presents for a well woman exam.     Is interested in hormone replacement therapy.  Patient states that her friends around the [place] her aging well and have so much more energy and she does.    Past Medical History:   Diagnosis Date    Genital herpes     Hypothyroid     Lichen sclerosus     Rheumatoid arthritis     Seasonal allergies        Past Surgical History:   Procedure Laterality Date    breast reduction      FACIAL COSMETIC SURGERY         OB History    Para Term  AB Living   3 2 2   1 2   SAB TAB Ectopic Multiple Live Births                  # Outcome Date GA Lbr Antonio/2nd Weight Sex Delivery Anes PTL Lv   3 Term  40w0d   M Vag-Spont      2 Term  40w0d   F Vag-Spont      1 AB               Obstetric Comments   Menopause 60       Family History   Problem Relation Age of Onset    Colon cancer Mother     No Known Problems Father     Ovarian cancer Sister     No Known Problems Brother     No Known Problems Maternal Aunt     No Known Problems Maternal Uncle     No Known Problems Paternal Aunt     No Known Problems Paternal Uncle     No Known Problems Maternal Grandmother     No Known Problems Maternal Grandfather     No Known Problems Paternal Grandmother     No Known Problems Paternal Grandfather     Blindness Neg Hx     Cataracts Neg Hx     Diabetes Neg Hx     Glaucoma Neg Hx     Amblyopia Neg Hx     Hypertension Neg Hx     Macular degeneration Neg Hx     Retinal detachment Neg Hx     Strabismus Neg Hx     Stroke Neg Hx     Thyroid disease Neg Hx     Breast cancer Neg Hx        Social History     Tobacco Use    Smoking status: Former Smoker    Smokeless tobacco: Never Used   Substance Use Topics    Alcohol use: No    Drug use: No       BP 98/60   Ht 5' 4" (1.626 m)   Wt 53.1 kg (117 lb 2.8 oz)   LMP 01/15/2016   BMI 20.11 kg/m²     ROS:  GENERAL: Denies weight gain or weight loss. " Feeling well overall.   SKIN: Denies rash or lesions.   HEAD: Denies head injury or headache.   NODES: Denies enlarged lymph nodes.   CHEST: Denies chest pain or shortness of breath.   CARDIOVASCULAR: Denies palpitations or left sided chest pain.   ABDOMEN: No abdominal pain, constipation, diarrhea, nausea, vomiting or rectal bleeding.   URINARY: No frequency, dysuria, hematuria, or burning on urination.  REPRODUCTIVE: See HPI.   BREASTS: The patient performs breast self-examination and denies pain, lumps, or nipple discharge.   HEMATOLOGIC: No easy bruisability or excessive bleeding.  MUSCULOSKELETAL: Denies joint pain or swelling.   NEUROLOGIC: Denies syncope or weakness.   PSYCHIATRIC: Denies depression, anxiety or mood swings.    Physical Exam:    APPEARANCE: Well nourished, well developed, in no acute distress.  AFFECT: WNL, alert and oriented x 3  SKIN: No acne or hirsutism  NECK: Neck symmetric without masses or thyromegaly  NODES: No inguinal, cervical, axillary, or femoral lymph node enlargement  CHEST: Good respiratory effect  ABDOMEN: Soft.  No tenderness or masses.  No hepatosplenomegaly.  No hernias.  BREASTS: Symmetrical, no skin changes or visible lesions.  No palpable masses, nipple discharge bilaterally.  PELVIC: Normal external genitalia without lesions.  Normal hair distribution.  Adequate perineal body, normal urethral meatus.  Vagina moist and well rugated without lesions or discharge.  Cervix pink, without lesions, discharge or tenderness.  No significant cystocele or rectocele.  Bimanual exam shows uterus to be normal size, regular, mobile and nontender.  Adnexa without masses or tenderness.    EXTREMITIES: No edema.    ASSESSMENT AND PLAN  1. Encounter for gynecological examination     2. Encounter for screening mammogram for breast cancer  Mammo Digital Screening Bilat w/ Chase    Mammo Digital Screening Bilat w/ Chase   3. Encounter for screening for cervical cancer   Liquid-Based Pap Smear,  Screening   4. Encounter for screening for human papillomavirus (HPV)  HPV High Risk Genotypes, PCR       Patient was counseled today on A.C.S. Pap guidelines and recommendations for yearly pelvic exams, mammograms and monthly self breast exams; to see her PCP for other health maintenance.     Hormone consultation with Dr. Ordaz    Follow up in about 1 year (around 12/19/2020).

## 2019-12-30 LAB
HPV HR 12 DNA SPEC QL NAA+PROBE: NEGATIVE
HPV16 AG SPEC QL: NEGATIVE
HPV18 DNA SPEC QL NAA+PROBE: NEGATIVE

## 2020-01-09 LAB
FINAL PATHOLOGIC DIAGNOSIS: NORMAL
Lab: NORMAL

## 2020-01-15 DIAGNOSIS — A60.00 GENITAL HERPES SIMPLEX, UNSPECIFIED SITE: ICD-10-CM

## 2020-01-16 RX ORDER — VALACYCLOVIR HYDROCHLORIDE 500 MG/1
TABLET, FILM COATED ORAL
Qty: 60 TABLET | Refills: 2 | Status: SHIPPED | OUTPATIENT
Start: 2020-01-16 | End: 2021-05-25 | Stop reason: SDUPTHER

## 2020-01-24 ENCOUNTER — TELEPHONE (OUTPATIENT)
Dept: RHEUMATOLOGY | Facility: CLINIC | Age: 65
End: 2020-01-24

## 2020-02-11 ENCOUNTER — TELEPHONE (OUTPATIENT)
Dept: OBSTETRICS AND GYNECOLOGY | Facility: CLINIC | Age: 65
End: 2020-02-11

## 2020-02-11 NOTE — TELEPHONE ENCOUNTER
Dr Welch pt calling, pt has herpes and they are not going away at all is there something else she can take. Pt # [phone]

## 2020-02-11 NOTE — TELEPHONE ENCOUNTER
Pt states she rarely has a HSV outbreak but she has been taking Valtrex BID for ~10 days (wasn't very good about taking it twice a day).  There has been no improvement, its been staying in the blister stage.  Recommended she increase valtrex to 1gm BIDx3 days.  Scheduled with Amanda Friday incase there is no improvement.

## 2020-02-17 ENCOUNTER — OFFICE VISIT (OUTPATIENT)
Dept: OBSTETRICS AND GYNECOLOGY | Facility: CLINIC | Age: 65
End: 2020-02-17
Payer: COMMERCIAL

## 2020-02-17 VITALS
WEIGHT: 117.5 LBS | HEIGHT: 64 IN | BODY MASS INDEX: 20.06 KG/M2 | SYSTOLIC BLOOD PRESSURE: 92 MMHG | DIASTOLIC BLOOD PRESSURE: 70 MMHG

## 2020-02-17 DIAGNOSIS — B96.89 BV (BACTERIAL VAGINOSIS): ICD-10-CM

## 2020-02-17 DIAGNOSIS — N76.0 BV (BACTERIAL VAGINOSIS): ICD-10-CM

## 2020-02-17 DIAGNOSIS — N89.8 VAGINAL DISCHARGE: Primary | ICD-10-CM

## 2020-02-17 PROCEDURE — 99999 PR PBB SHADOW E&M-EST. PATIENT-LVL IV: CPT | Mod: PBBFAC,,, | Performed by: NURSE PRACTITIONER

## 2020-02-17 PROCEDURE — 99214 OFFICE O/P EST MOD 30 MIN: CPT | Mod: S$GLB,,, | Performed by: NURSE PRACTITIONER

## 2020-02-17 PROCEDURE — 99214 PR OFFICE/OUTPT VISIT, EST, LEVL IV, 30-39 MIN: ICD-10-PCS | Mod: S$GLB,,, | Performed by: NURSE PRACTITIONER

## 2020-02-17 PROCEDURE — 99999 PR PBB SHADOW E&M-EST. PATIENT-LVL IV: ICD-10-PCS | Mod: PBBFAC,,, | Performed by: NURSE PRACTITIONER

## 2020-02-17 PROCEDURE — 3008F BODY MASS INDEX DOCD: CPT | Mod: CPTII,S$GLB,, | Performed by: NURSE PRACTITIONER

## 2020-02-17 PROCEDURE — 3008F PR BODY MASS INDEX (BMI) DOCUMENTED: ICD-10-PCS | Mod: CPTII,S$GLB,, | Performed by: NURSE PRACTITIONER

## 2020-02-17 RX ORDER — METRONIDAZOLE 7.5 MG/G
GEL VAGINAL
Qty: 70 G | Refills: 0 | Status: SHIPPED | OUTPATIENT
Start: 2020-02-17 | End: 2020-12-23 | Stop reason: SDUPTHER

## 2020-02-17 NOTE — PROGRESS NOTES
Chief Complaint: Problem:     Chief Complaint   Patient presents with    Lesion     wax; pt c/o of hsv lesions on buttocks since  that has not resolved after taking valtrex        (Dr. Welch patient)    Last Pap:  2016      HPI:     Celeste Mohr is a 64 y.o. female  presents with complaints of possible HSV outbreak to crease of buttocks that has been there for 3 weeks per patient.  She has been taking Valtrex 500mg QD, and on 20, she began taking Valtrex 1gm BID.  She also c/o white milky vaginal discharge and odor.  She is not sexually active.    She declines STD screening today.    LMP:  Patient's last menstrual period was 01/15/2016.  She is currently using no method, abstinence for contraception.    Past Medical History:   Diagnosis Date    Genital herpes     Hypothyroid     Lichen sclerosus     Rheumatoid arthritis     Seasonal allergies        Past Surgical History:   Procedure Laterality Date    breast reduction      FACIAL COSMETIC SURGERY         OB History    Para Term  AB Living   3 2 2   1 2   SAB TAB Ectopic Multiple Live Births                  # Outcome Date GA Lbr Antonio/2nd Weight Sex Delivery Anes PTL Lv   3 Term  40w0d   M Vag-Spont      2 Term  40w0d   F Vag-Spont      1 AB               Obstetric Comments   Menopause 60       ROS:     GENERAL: Denies unintentional weight gain or weight loss. Feeling well overall.   SKIN: Denies rash or lesions. Reports lesion/blister to crease of buttock.  HEENT: Denies headaches, or vision changes.   CARDIOVASCULAR: Denies palpitations or chest pain.   RESPIRATORY: Denies shortness of breath or dyspnea on exertion.  BREASTS: Denies pain, lumps, or nipple discharge.   ABDOMEN: Denies abdominal pain, constipation, diarrhea, nausea, vomiting, change in appetite.  URINARY: Denies frequency, dysuria, hematuria.  NEUROLOGIC: Denies syncope or weakness.   PSYCHIATRIC: Denies depression, anxiety or mood  "swings.  VULVAR: Denies pain, lesions, or itching.  VAGINAL: See HPI.  Denies pain, itching, abnormal bleeding, or lesions.    Physical Exam:      BP 92/70   Ht 5' 4" (1.626 m)   Wt 53.3 kg (117 lb 8.1 oz)   LMP 01/15/2016   BMI 20.17 kg/m²   Body mass index is 20.17 kg/m².     APPEARANCE: Well nourished, well developed, in no acute distress.  PSYCH: Appropriate mood and affect.  SKIN: No acne or hirsutism. No blister or lesions noted to crease between buttocks; possible healed scar noted, but no ulcers or lesions noted that appear to be c/w HSV.  CHEST: Normal respiratory effort.  ABDOMEN: Soft.  No tenderness, masses, or guarding.  (--) suprapubic tenderness.    PELVIC: Normal external genitalia without lesions.  Normal hair distribution.  Adequate perineal body, normal urethral meatus.  Vagina moist and well rugated without lesions; sm/mod amount malodorous, white, homogenous discharge - KOH/WetPrep collected.  Cervix pink, without lesions, discharge or tenderness.  No significant cystocele or rectocele.  Bimanual exam shows uterus to be normal size, regular, mobile and nontender.  Adnexa without masses or tenderness.    .  Results:     KOH/Wet Prep results:  BV:   POS,  Candida:  neg,  Trichomonas:   neg       (See full results under LABS in Epic)    Assessment/Plan:   Vaginal discharge    BV (bacterial vaginosis)  -     metroNIDAZOLE (METROGEL VAGINAL) 0.75 % vaginal gel; One applicatorful vaginally at bedtime x 5 nights; use once weekly thereafter until tube empty.  Dispense: 70 g; Refill: 0      Counseling:     * Use of the [employer] Patient Portal discussed and encouraged during today's visit.     Follow-Up:  Follow up if symptoms worsen or fail to improve.  "

## 2020-02-18 ENCOUNTER — LAB VISIT (OUTPATIENT)
Dept: LAB | Facility: HOSPITAL | Age: 65
End: 2020-02-18
Attending: INTERNAL MEDICINE
Payer: COMMERCIAL

## 2020-02-18 DIAGNOSIS — M06.9 RHEUMATOID ARTHRITIS INVOLVING MULTIPLE JOINTS: ICD-10-CM

## 2020-02-18 LAB
ALBUMIN SERPL BCP-MCNC: 3.9 G/DL (ref 3.5–5.2)
ALP SERPL-CCNC: 100 U/L (ref 55–135)
ALT SERPL W/O P-5'-P-CCNC: 22 U/L (ref 10–44)
ANION GAP SERPL CALC-SCNC: 6 MMOL/L (ref 8–16)
AST SERPL-CCNC: 22 U/L (ref 10–40)
BASOPHILS # BLD AUTO: 0.04 K/UL (ref 0–0.2)
BASOPHILS NFR BLD: 0.8 % (ref 0–1.9)
BILIRUB SERPL-MCNC: 0.4 MG/DL (ref 0.1–1)
BUN SERPL-MCNC: 18 MG/DL (ref 8–23)
CALCIUM SERPL-MCNC: 9.2 MG/DL (ref 8.7–10.5)
CHLORIDE SERPL-SCNC: 107 MMOL/L (ref 95–110)
CO2 SERPL-SCNC: 28 MMOL/L (ref 23–29)
CREAT SERPL-MCNC: 0.8 MG/DL (ref 0.5–1.4)
CRP SERPL-MCNC: 0.5 MG/L (ref 0–8.2)
DIFFERENTIAL METHOD: ABNORMAL
EOSINOPHIL # BLD AUTO: 0.1 K/UL (ref 0–0.5)
EOSINOPHIL NFR BLD: 2.9 % (ref 0–8)
ERYTHROCYTE [DISTWIDTH] IN BLOOD BY AUTOMATED COUNT: 13.5 % (ref 11.5–14.5)
ERYTHROCYTE [SEDIMENTATION RATE] IN BLOOD BY WESTERGREN METHOD: <2 MM/HR (ref 0–36)
EST. GFR  (AFRICAN AMERICAN): >60 ML/MIN/1.73 M^2
EST. GFR  (NON AFRICAN AMERICAN): >60 ML/MIN/1.73 M^2
GLUCOSE SERPL-MCNC: 113 MG/DL (ref 70–110)
HCT VFR BLD AUTO: 38.8 % (ref 37–48.5)
HGB BLD-MCNC: 12.1 G/DL (ref 12–16)
IMM GRANULOCYTES # BLD AUTO: 0.02 K/UL (ref 0–0.04)
IMM GRANULOCYTES NFR BLD AUTO: 0.4 % (ref 0–0.5)
LYMPHOCYTES # BLD AUTO: 1.3 K/UL (ref 1–4.8)
LYMPHOCYTES NFR BLD: 25.9 % (ref 18–48)
MCH RBC QN AUTO: 30.6 PG (ref 27–31)
MCHC RBC AUTO-ENTMCNC: 31.2 G/DL (ref 32–36)
MCV RBC AUTO: 98 FL (ref 82–98)
MONOCYTES # BLD AUTO: 0.4 K/UL (ref 0.3–1)
MONOCYTES NFR BLD: 8.3 % (ref 4–15)
NEUTROPHILS # BLD AUTO: 3 K/UL (ref 1.8–7.7)
NEUTROPHILS NFR BLD: 61.7 % (ref 38–73)
NRBC BLD-RTO: 0 /100 WBC
PLATELET # BLD AUTO: 202 K/UL (ref 150–350)
PMV BLD AUTO: 10.4 FL (ref 9.2–12.9)
POTASSIUM SERPL-SCNC: 4.1 MMOL/L (ref 3.5–5.1)
PROT SERPL-MCNC: 6.4 G/DL (ref 6–8.4)
RBC # BLD AUTO: 3.95 M/UL (ref 4–5.4)
SODIUM SERPL-SCNC: 141 MMOL/L (ref 136–145)
WBC # BLD AUTO: 4.83 K/UL (ref 3.9–12.7)

## 2020-02-18 PROCEDURE — 85025 COMPLETE CBC W/AUTO DIFF WBC: CPT

## 2020-02-18 PROCEDURE — 86704 HEP B CORE ANTIBODY TOTAL: CPT

## 2020-02-18 PROCEDURE — 80053 COMPREHEN METABOLIC PANEL: CPT

## 2020-02-18 PROCEDURE — 85652 RBC SED RATE AUTOMATED: CPT

## 2020-02-18 PROCEDURE — 36415 COLL VENOUS BLD VENIPUNCTURE: CPT

## 2020-02-18 PROCEDURE — 86140 C-REACTIVE PROTEIN: CPT

## 2020-02-19 LAB — HBV CORE AB SERPL QL IA: NEGATIVE

## 2020-02-24 ENCOUNTER — PATIENT MESSAGE (OUTPATIENT)
Dept: RHEUMATOLOGY | Facility: CLINIC | Age: 65
End: 2020-02-24

## 2020-02-28 DIAGNOSIS — Z79.631 ENCOUNTER FOR METHOTREXATE MONITORING: ICD-10-CM

## 2020-02-28 DIAGNOSIS — Z51.81 ENCOUNTER FOR METHOTREXATE MONITORING: ICD-10-CM

## 2020-02-28 DIAGNOSIS — M06.9 RHEUMATOID ARTHRITIS FLARE: ICD-10-CM

## 2020-02-28 NOTE — TELEPHONE ENCOUNTER
----- Message from Jacqui Strange MD sent at 2/28/2020 12:47 PM CST -----  Contact: self    Hi  Tell her only way to find out if shes going to be billed is for her to call her insurance company to see if they will pay for her visit.    Dr. Strange  ----- Message -----  From: Shruti Nair MA  Sent: 2/28/2020  11:53 AM CST  To: Jacqui Strange MD    Please advise, thanks   ----- Message -----  From: Vidal Lafleur  Sent: 2/28/2020  10:54 AM CST  To: Alexandr Osman Staff    Pt states that her new insurance starts on 4/1 and her appt is on 3/31 she's asking if someone will still see her and bill her the next day she's asking for a callback       Contact info

## 2020-03-02 ENCOUNTER — OFFICE VISIT (OUTPATIENT)
Dept: OBSTETRICS AND GYNECOLOGY | Facility: CLINIC | Age: 65
End: 2020-03-02
Attending: OBSTETRICS & GYNECOLOGY

## 2020-03-02 VITALS
DIASTOLIC BLOOD PRESSURE: 60 MMHG | BODY MASS INDEX: 19.96 KG/M2 | SYSTOLIC BLOOD PRESSURE: 90 MMHG | WEIGHT: 116.88 LBS | HEIGHT: 64 IN

## 2020-03-02 DIAGNOSIS — Z12.11 SCREEN FOR COLON CANCER: ICD-10-CM

## 2020-03-02 DIAGNOSIS — Z13.21 ENCOUNTER FOR VITAMIN DEFICIENCY SCREENING: ICD-10-CM

## 2020-03-02 DIAGNOSIS — Z78.0 MENOPAUSE: Primary | ICD-10-CM

## 2020-03-02 PROCEDURE — 99214 PR OFFICE/OUTPT VISIT, EST, LEVL IV, 30-39 MIN: ICD-10-PCS | Mod: S$GLB,,, | Performed by: OBSTETRICS & GYNECOLOGY

## 2020-03-02 PROCEDURE — 99214 OFFICE O/P EST MOD 30 MIN: CPT | Mod: S$GLB,,, | Performed by: OBSTETRICS & GYNECOLOGY

## 2020-03-02 PROCEDURE — 99999 PR PBB SHADOW E&M-EST. PATIENT-LVL IV: ICD-10-PCS | Mod: PBBFAC,,, | Performed by: OBSTETRICS & GYNECOLOGY

## 2020-03-02 PROCEDURE — 99999 PR PBB SHADOW E&M-EST. PATIENT-LVL IV: CPT | Mod: PBBFAC,,, | Performed by: OBSTETRICS & GYNECOLOGY

## 2020-03-02 RX ORDER — BENZONATATE 100 MG/1
CAPSULE ORAL
COMMUNITY
Start: 2020-02-28

## 2020-03-02 RX ORDER — METHOTREXATE 2.5 MG/1
20 TABLET ORAL
Qty: 96 TABLET | Refills: 0 | Status: SHIPPED | OUTPATIENT
Start: 2020-03-02 | End: 2020-06-22

## 2020-03-02 RX ORDER — ESTRADIOL 1 MG/1
1 TABLET ORAL DAILY
Qty: 30 TABLET | Refills: 11 | Status: SHIPPED | OUTPATIENT
Start: 2020-03-02 | End: 2020-04-21

## 2020-03-02 RX ORDER — PROGESTERONE 100 MG/1
CAPSULE ORAL
Qty: 30 CAPSULE | Refills: 11 | Status: SHIPPED | OUTPATIENT
Start: 2020-03-02 | End: 2020-04-21

## 2020-03-02 NOTE — PROGRESS NOTES
Subjective:      Celeste Mohr is a 64 y.o. female who presents to discuss hormone replacement therapy.  Menarche occurred at age 18 and the patient went into menopause at 60 years of age, which was 4 years ago. Patient is requesting hormone replacement therapy due to hot flashes, insomnia, moodiness, no energy, vaginal dryness, anxiety, decreased libido, depression, dry skin, memory loss, brain fog, trouble concentrating, and joint pain.  The patient has never been on hormone replacement therapy. Patient denies post-menopausal vaginal bleeding. The patient is not currently sexually active.  She denies the following contraindications to HRT:  Vaginal bleeding, history of VTE/PE, thrombophilia,  breast cancer, or active liver disease.       PCP: Dr. Christopher Jamil     Routine labs: 2/2020 Normal except glucose 113  Pap smear: 12/19/19 Normal HPV negative  Mammogram: [facility] - 1 year ago normal  DEXA: A few years ago with PCP - normal  Colonoscopy: At 52 - normal    Lab Visit on 02/18/2020   Component Date Value Ref Range Status    Sodium 02/18/2020 141  136 - 145 mmol/L Final    Potassium 02/18/2020 4.1  3.5 - 5.1 mmol/L Final    Chloride 02/18/2020 107  95 - 110 mmol/L Final    CO2 02/18/2020 28  23 - 29 mmol/L Final    Glucose 02/18/2020 113* 70 - 110 mg/dL Final    BUN, Bld 02/18/2020 18  8 - 23 mg/dL Final    Creatinine 02/18/2020 0.8  0.5 - 1.4 mg/dL Final    Calcium 02/18/2020 9.2  8.7 - 10.5 mg/dL Final    Total Protein 02/18/2020 6.4  6.0 - 8.4 g/dL Final    Albumin 02/18/2020 3.9  3.5 - 5.2 g/dL Final    Total Bilirubin 02/18/2020 0.4  0.1 - 1.0 mg/dL Final    Alkaline Phosphatase 02/18/2020 100  55 - 135 U/L Final    AST 02/18/2020 22  10 - 40 U/L Final    ALT 02/18/2020 22  10 - 44 U/L Final    Anion Gap 02/18/2020 6* 8 - 16 mmol/L Final    eGFR if African American 02/18/2020 >60.0  >60 mL/min/1.73 m^2 Final    eGFR if non African American 02/18/2020 >60.0  >60 mL/min/1.73 m^2 Final     CRP 02/18/2020 0.5  0.0 - 8.2 mg/L Final    WBC 02/18/2020 4.83  3.90 - 12.70 K/uL Final    RBC 02/18/2020 3.95* 4.00 - 5.40 M/uL Final    Hemoglobin 02/18/2020 12.1  12.0 - 16.0 g/dL Final    Hematocrit 02/18/2020 38.8  37.0 - 48.5 % Final    Mean Corpuscular Volume 02/18/2020 98  82 - 98 fL Final    Mean Corpuscular Hemoglobin 02/18/2020 30.6  27.0 - 31.0 pg Final    Mean Corpuscular Hemoglobin Conc 02/18/2020 31.2* 32.0 - 36.0 g/dL Final    RDW 02/18/2020 13.5  11.5 - 14.5 % Final    Platelets 02/18/2020 202  150 - 350 K/uL Final    MPV 02/18/2020 10.4  9.2 - 12.9 fL Final    Immature Granulocytes 02/18/2020 0.4  0.0 - 0.5 % Final    Gran # (ANC) 02/18/2020 3.0  1.8 - 7.7 K/uL Final    Immature Grans (Abs) 02/18/2020 0.02  0.00 - 0.04 K/uL Final    Lymph # 02/18/2020 1.3  1.0 - 4.8 K/uL Final    Mono # 02/18/2020 0.4  0.3 - 1.0 K/uL Final    Eos # 02/18/2020 0.1  0.0 - 0.5 K/uL Final    Baso # 02/18/2020 0.04  0.00 - 0.20 K/uL Final    nRBC 02/18/2020 0  0 /100 WBC Final    Gran% 02/18/2020 61.7  38.0 - 73.0 % Final    Lymph% 02/18/2020 25.9  18.0 - 48.0 % Final    Mono% 02/18/2020 8.3  4.0 - 15.0 % Final    Eosinophil% 02/18/2020 2.9  0.0 - 8.0 % Final    Basophil% 02/18/2020 0.8  0.0 - 1.9 % Final    Differential Method 02/18/2020 Automated   Final    Sed Rate 02/18/2020 <2  0 - 36 mm/Hr Final    Hep B Core Total Ab 02/18/2020 Negative   Final   Office Visit on 12/19/2019   Component Date Value Ref Range Status    HPV other High Risk types, PCR 12/19/2019 Negative  Negative Final    HPV High Risk type 16, PCR 12/19/2019 Negative  Negative Final    HPV High Risk type 18, PCR 12/19/2019 Negative  Negative Final    Final Pathologic Diagnosis 12/19/2019    Final                    Value:Specimen Adequacy  Satisfactory for interpretation.    [place] Category  Negative for intraepithelial lesion or malignancy.  Atrophic smear.  Inflammation present.  Sparsely cellular specimen.       Disclaimer 12/19/2019    Final                    Value:The Pap smear is a screening test that aids in the detection of cervical  cancer and cancer precursors. Both false positive and false negative results  can occur. The test should be used at regular intervals, and positive results  should be confirmed before definitive therapy.  This liquid based specimen is processed using the  or  Thin PrepPAP  System. This specimen has been analyzed by the ThinPrep Imaging System  (Adaptly), an automated imaging and review system which assists  the laboratory in evaluating cells on ThinPrep PAP tests. Following automated  imaging, selected fields from every slide are reviewed by a cytotechnologist  and/or pathologist.         Past Medical History:   Diagnosis Date    Genital herpes     Hypothyroid     Lichen sclerosus     Menopause 2016    Rheumatoid arthritis     Seasonal allergies      Past Surgical History:   Procedure Laterality Date    FACIAL COSMETIC SURGERY      REDUCTION OF BOTH BREASTS  1988    VAGINAL DELIVERY  1992, 1995     Social History     Tobacco Use    Smoking status: Former Smoker    Smokeless tobacco: Never Used    Tobacco comment: in high school /college    Substance Use Topics    Alcohol use: Yes     Comment: Social     Drug use: No     Family History   Problem Relation Age of Onset    Colon cancer Mother     Alzheimer's disease Father     Ovarian cancer Sister     No Known Problems Brother     No Known Problems Maternal Aunt     No Known Problems Maternal Uncle     No Known Problems Paternal Aunt     No Known Problems Paternal Uncle     No Known Problems Maternal Grandmother     No Known Problems Maternal Grandfather     No Known Problems Paternal Grandmother     No Known Problems Paternal Grandfather     Blindness Neg Hx     Cataracts Neg Hx     Diabetes Neg Hx     Glaucoma Neg Hx     Amblyopia Neg Hx     Hypertension Neg Hx     Macular degeneration  Neg Hx     Retinal detachment Neg Hx     Strabismus Neg Hx     Stroke Neg Hx     Thyroid disease Neg Hx     Breast cancer Neg Hx      OB History    Para Term  AB Living   3 2 2   1 2   SAB TAB Ectopic Multiple Live Births     1     2      # Outcome Date GA Lbr Antonio/2nd Weight Sex Delivery Anes PTL Lv   3 Term  40w0d  3.629 kg (8 lb) M Vag-Spont   MAYLIN   2 Term  40w0d  3.175 kg (7 lb) F Vag-Spont   MAYLIN   1 TAB      TAB         Obstetric Comments   Menopause ~ 60   Menarche ~ 18       Current Outpatient Medications:     AMITIZA 24 mcg Cap, TAKE ONE CAPSULE BY MOUTH once OR twice day, Disp: , Rfl: 0    azelastine (ASTELIN) 137 mcg (0.1 %) nasal spray, 2 sprays (274 mcg total) by Nasal route 2 (two) times daily., Disp: 30 mL, Rfl: 0    azithromycin (Z-DARSHAN) 250 MG tablet, TAKE 2 TABLETS BY MOUTH TODAY THEN 1 TABLET DAILY FOR 4 DAYS, Disp: , Rfl: 0    fluticasone propionate (FLONASE) 50 mcg/actuation nasal spray, 2 sprays (100 mcg total) by Each Nare route once daily., Disp: 1 Bottle, Rfl: 8    folic acid (FOLVITE) 1 MG tablet, Take 1 tablet (1 mg total) by mouth once daily., Disp: 90 tablet, Rfl: 4    LORazepam (ATIVAN) 0.5 MG tablet, Take 0.5 mg by mouth 2 (two) times daily., Disp: , Rfl: 5    methotrexate 2.5 MG Tab, Take 8 tablets (20 mg total) by mouth every 7 days., Disp: 96 tablet, Rfl: 0    sertraline (ZOLOFT) 50 MG tablet, Take 50 mg by mouth once daily., Disp: , Rfl: 11    SYNTHROID 125 mcg tablet, TAKE ONE TABLET BY MOUTH once DAILY EVERY MORNING on empty stomach, Disp: , Rfl: 0    valACYclovir (VALTREX) 500 MG tablet, At onset of outbreak, take 1 tablet (1,000 mg) by mouth twice daily for 7-10 days., Disp: 60 tablet, Rfl: 2    zolpidem (AMBIEN) 10 mg Tab, Take 10 mg by mouth nightly., Disp: , Rfl: 5    ARIPiprazole (ABILIFY) 2 MG Tab, TAKE ONE-HALF TABLET BY MOUTH EVERY OTHER DAY FOR 7 days then ONE-HALF TABLET DAILY as-necessary/tolerated, Disp: , Rfl: 11    benzonatate  "(TESSALON) 100 MG capsule, , Disp: , Rfl:     butalbital-aspirin-caffeine -40 mg (FIORINAL) -40 mg Cap, Take 1 capsule by mouth every 4 (four) hours as needed., Disp: , Rfl:     estradiol (ESTRACE) 1 MG tablet, Take 1 tablet (1 mg total) by mouth once daily. Every morning, Disp: 30 tablet, Rfl: 11    ferrous sulfate 325 (65 FE) MG EC tablet, Take 325 mg by mouth 3 (three) times daily with meals., Disp: , Rfl:     lithium carbonate 150 MG capsule, TAKE TWO CAPSULE BY MOUTH AT BEDTIME as-neccessary /as-tolerated, Disp: , Rfl: 11    metroNIDAZOLE (METROGEL VAGINAL) 0.75 % vaginal gel, One applicatorful vaginally at bedtime x 5 nights; use once weekly thereafter until tube empty., Disp: 70 g, Rfl: 0    progesterone (PROMETRIUM) 100 MG capsule, Take 1 capsule by mouth 30-60 minutes before bed, Disp: 30 capsule, Rfl: 11    Review of Systems:  General: No fever, chills, or weight loss.  Chest: No chest pain, shortness of breath, or palpitations.  Breast: No pain, masses, or nipple discharge.  Vulva: No pain, lesions, or itching.  Vagina: No relaxation, itching, discharge, or lesions.  Abdomen: No pain, nausea, vomiting, diarrhea, or constipation.  Urinary: No incontinence, nocturia, frequency, or dysuria.  Extremities:  No leg cramps, edema, or calf pain.  Neurologic: No headaches, dizziness, or visual changes.    Vitals:    03/02/20 1527   BP: 90/60   Weight: 53 kg (116 lb 13.5 oz)   Height: 5' 4" (1.626 m)   PainSc: 0-No pain     Body mass index is 20.06 kg/m².    Assessment:    Menopause  -     DXA Bone Density Spine And Hip; Future; Expected date: 03/02/2020  -     Estradiol; Future; Expected date: 03/02/2020  -     Progesterone; Future; Expected date: 03/02/2020  -     Testosterone; Future; Expected date: 03/02/2020  -     Testosterone, free; Future; Expected date: 03/02/2020  -     estradiol (ESTRACE) 1 MG tablet; Take 1 tablet (1 mg total) by mouth once daily. Every morning  Dispense: 30 tablet; " Refill: 11  -     progesterone (PROMETRIUM) 100 MG capsule; Take 1 capsule by mouth 30-60 minutes before bed  Dispense: 30 capsule; Refill: 11    Screen for colon cancer  -     Ambulatory referral/consult to Gastroenterology; Future; Expected date: 03/09/2020    Encounter for vitamin deficiency screening  -     Vitamin B12; Future; Expected date: 03/02/2020  -     Vitamin D; Future; Expected date: 03/02/2020        Plan:   Risks and benefits of hormone replacement therapy were discussed.  Hormone replacement therapy options, including bioidentical versus non-bioidentical hormones, as well as alternatives discussed.    Start:   Estradiol 2 mg orally QAM.   Progesterone 200 mg orally QPM   Testosterone cream 2%.  Apply one pump to the labia minora (which are the lips on the vulva without hair around the vaginal opening)  every evening before bed.  Do not wear underwear to avoid removal.  Avoid any direct skin to skin contact in that area for at least  4 hours after application to avoid transfer to another person.  Also, please wash hands thoroughly after application to avoid transfer to  pets or children.  FDA warning for MI, stroke, and DVT reviewed.  Patient is aware this is off-label use.   Nature Made Calcium orally 600 mg QD   Vitamin D3 2000 IU daily     Discussed:    Vitamin D and Vitamin B12 recommendations after lab results  Will draw labs after insurance change.  Follow up in 3 months  Will recheck labs once on typical optimal dose or if having side effects.  Instructed patient to call if she experiences any side effects or has any questions.  I spent 30 minutes with this patient today, >50% counseling.

## 2020-03-03 ENCOUNTER — TELEPHONE (OUTPATIENT)
Dept: OBSTETRICS AND GYNECOLOGY | Facility: CLINIC | Age: 65
End: 2020-03-03

## 2020-03-03 NOTE — TELEPHONE ENCOUNTER
Left voice message for patient to schedule appointment from referral to Gastroenterology.  Everton SAEED  [phone]

## 2020-03-03 NOTE — TELEPHONE ENCOUNTER
Spoke with pt and informed her to take estradiol in the morning and her progesterone at night.  Pt verbalized understanding.

## 2020-03-03 NOTE — TELEPHONE ENCOUNTER
Dr. Welch pt called asking if she needs to take Estrdiol in the morning or the evening. Please advise. Thank you.

## 2020-03-04 ENCOUNTER — TELEPHONE (OUTPATIENT)
Dept: ENDOSCOPY | Facility: HOSPITAL | Age: 65
End: 2020-03-04

## 2020-03-04 DIAGNOSIS — Z12.11 SPECIAL SCREENING FOR MALIGNANT NEOPLASMS, COLON: Primary | ICD-10-CM

## 2020-03-04 NOTE — TELEPHONE ENCOUNTER
In basket message received regarding ambulatory referral for screening colonoscopy. Patient scheduled on 4/8/20 at 8:30am.        Everton SALDAÑA [facility]             Good Afternoon,     Patient wants to schedule a colonoscopy.  Please call the patient and see how you can help her.     Thanks,   Everton SAEED

## 2020-03-16 ENCOUNTER — TELEPHONE (OUTPATIENT)
Dept: OBSTETRICS AND GYNECOLOGY | Facility: CLINIC | Age: 65
End: 2020-03-16

## 2020-03-17 ENCOUNTER — PATIENT MESSAGE (OUTPATIENT)
Dept: RHEUMATOLOGY | Facility: CLINIC | Age: 65
End: 2020-03-17

## 2020-03-17 NOTE — TELEPHONE ENCOUNTER
Informed pt she will apply testosterone cream to inner labia.  She has been apply it to labia majora.  Verbalized understanding.

## 2020-03-18 ENCOUNTER — TELEPHONE (OUTPATIENT)
Dept: RHEUMATOLOGY | Facility: CLINIC | Age: 65
End: 2020-03-18

## 2020-03-20 ENCOUNTER — TELEPHONE (OUTPATIENT)
Dept: RHEUMATOLOGY | Facility: CLINIC | Age: 65
End: 2020-03-20

## 2020-03-23 ENCOUNTER — PATIENT MESSAGE (OUTPATIENT)
Dept: RHEUMATOLOGY | Facility: CLINIC | Age: 65
End: 2020-03-23

## 2020-03-23 ENCOUNTER — TELEPHONE (OUTPATIENT)
Dept: RHEUMATOLOGY | Facility: CLINIC | Age: 65
End: 2020-03-23

## 2020-03-23 NOTE — TELEPHONE ENCOUNTER
----- Message from Henrry Murillo sent at 3/23/2020  1:29 PM CDT -----  Contact: self  Mg   Pt called last week  No return call as of today she has  concerns about  taking her Methotrexate she's stop taking it and would like Dr or nurse to give her a call to make sure that's the right thing to do     Contact

## 2020-03-23 NOTE — TELEPHONE ENCOUNTER
----- Message from Jacqui Strange MD sent at 3/20/2020  1:47 PM CDT -----  Tell her unless instructed to stop by me, she needs to continue her medications.  If she has any questions, she can schedule a telemedicine visit.  ----- Message -----  From: Shruti Nair MA  Sent: 3/20/2020  11:28 AM CDT  To: Jacqui Strange MD    Hey, patient wants to know should she still be taking her MTX since her immune system is low. She takes care of her mother?

## 2020-03-30 ENCOUNTER — TELEPHONE (OUTPATIENT)
Dept: RHEUMATOLOGY | Facility: CLINIC | Age: 65
End: 2020-03-30

## 2020-03-30 NOTE — TELEPHONE ENCOUNTER
----- Message from Sandi Hall sent at 3/30/2020 10:58 AM CDT -----  Contact: pt  Pt would like to receive a call back regarding her upcoming virtual appt. Please contact the pt to advise.    Contact info [phone]

## 2020-03-31 ENCOUNTER — TELEPHONE (OUTPATIENT)
Dept: RHEUMATOLOGY | Facility: CLINIC | Age: 65
End: 2020-03-31

## 2020-03-31 ENCOUNTER — OFFICE VISIT (OUTPATIENT)
Dept: RHEUMATOLOGY | Facility: CLINIC | Age: 65
End: 2020-03-31
Payer: COMMERCIAL

## 2020-03-31 DIAGNOSIS — Z79.631 ENCOUNTER FOR METHOTREXATE MONITORING: ICD-10-CM

## 2020-03-31 DIAGNOSIS — M06.9 RHEUMATOID ARTHRITIS FLARE: Primary | ICD-10-CM

## 2020-03-31 DIAGNOSIS — Z51.81 ENCOUNTER FOR METHOTREXATE MONITORING: ICD-10-CM

## 2020-03-31 DIAGNOSIS — Z12.11 SPECIAL SCREENING FOR MALIGNANT NEOPLASMS, COLON: Primary | ICD-10-CM

## 2020-03-31 PROCEDURE — 99214 PR OFFICE/OUTPT VISIT, EST, LEVL IV, 30-39 MIN: ICD-10-PCS | Mod: 95,,, | Performed by: INTERNAL MEDICINE

## 2020-03-31 PROCEDURE — 99214 OFFICE O/P EST MOD 30 MIN: CPT | Mod: 95,,, | Performed by: INTERNAL MEDICINE

## 2020-03-31 RX ORDER — PREDNISONE 20 MG/1
20 TABLET ORAL DAILY
Qty: 10 TABLET | Refills: 0 | Status: SHIPPED | OUTPATIENT
Start: 2020-03-31 | End: 2020-06-01

## 2020-03-31 RX ORDER — POLYETHYLENE GLYCOL 3350, SODIUM SULFATE ANHYDROUS, SODIUM BICARBONATE, SODIUM CHLORIDE, POTASSIUM CHLORIDE 236; 22.74; 6.74; 5.86; 2.97 G/4L; G/4L; G/4L; G/4L; G/4L
4 POWDER, FOR SOLUTION ORAL ONCE
Qty: 4000 ML | Refills: 0 | Status: SHIPPED | OUTPATIENT
Start: 2020-03-31 | End: 2020-03-31

## 2020-03-31 NOTE — PROGRESS NOTES
Progress Notes        Subjective:       Patient ID: Celeste Mohr is a 62 y.o. female.     Chief Complaint: Disease Management     HPI      62 year old female with PMH of hypothyroidism, pericardial effusion here for evaluation. She was diagnosed with pericardial effusion January 19th.  Reports that the effusion was incidentally found on the chest xray. She reports she has pain in elbows,hands, and feet. Reports that she has been having joint pain for a couple of months. Pain level can be as high as 8/10, aching, non-radiating. Reports that her hands get swollen.  Denies fevers, hair loss, or photosensitivity. Denies raynauds, blood clot or miscarriage. Reports that she has stiffness in hands, ankles,elbows and knees.  Reports she has stiffness for 1.5 to 2 hours.   She started medrol pack with improvement in pain. Reports meloxicam did not help. Reports she has pain with opening jars.Quit smoking in 30s to 40s.  Denies chest pain or SOB.        Interval history:she stopped MTX first 2 weeks in march because she wast taking antibiotics. She has not taking it in several weeks.She took one dose of MTX on Sunday.   She just started hormones.  For last 4 or 5 days, she feels increased pain and stiffness in hands and right elbow.  Denies any swelling. She reports morning stiffness.          Family hx: negative for RA  Review of Systems   Constitutional: Positive for fatigue. Negative for activity change, appetite change, chills and diaphoresis.   HENT: Negative for congestion, ear discharge, ear pain, facial swelling, mouth sores, sinus pressure, sneezing, sore throat, tinnitus and trouble swallowing.    Eyes: Negative for photophobia, pain, discharge, redness, itching and visual disturbance.   Respiratory: Negative for apnea, chest tightness, shortness of breath, wheezing and stridor.    Cardiovascular: Negative for leg swelling.   Gastrointestinal: Negative for abdominal distention, abdominal pain, anal bleeding,  blood in stool, constipation, diarrhea and nausea.   Endocrine: Negative for cold intolerance and heat intolerance.   Genitourinary: Negative for difficulty urinating and dysuria.   Musculoskeletal: Positive for arthralgias and back pain. Negative for gait problem, joint swelling, myalgias, neck pain and neck stiffness.   Skin: Negative for color change, pallor, rash and wound.   Neurological: Negative for dizziness, seizures, light-headedness and numbness.   Hematological: Negative for adenopathy. Does not bruise/bleed easily.   Psychiatric/Behavioral: Negative for sleep disturbance. The patient is not nervous/anxious.              Objective:         Physical Exam   Constitutional: She is oriented to person, place, and time.   HENT:   Head: Normocephalic and atraumatic.   Right Ear: External ear normal.   Left Ear: External ear normal.   Nose: Nose normal.   Mouth/Throat: Oropharynx is clear and moist. No oropharyngeal exudate.   Eyes: Conjunctivae and EOM are normal. Pupils are equal, round, and reactive to light. Right eye exhibits no discharge. Left eye exhibits no discharge. No scleral icterus.   Neck: Neck supple. No JVD present. No thyromegaly present.   Cardiovascular: Normal rate, regular rhythm, normal heart sounds and intact distal pulses.  Exam reveals no gallop and no friction rub.    No murmur heard.  Pulmonary/Chest: Effort normal and breath sounds normal. No respiratory distress. She has no wheezes. She has no rales. She exhibits no tenderness.   Abdominal: Soft. Bowel sounds are normal. She exhibits no distension and no mass. There is no tenderness. There is no rebound and no guarding.   Lymphadenopathy:     She has no cervical adenopathy.   Neurological: She is alert and oriented to person, place, and time. No cranial nerve deficit. Gait normal. Coordination normal.   Skin: Skin is dry. No rash noted. No erythema. No pallor.     Psychiatric: Affect and judgment normal.   Musculoskeletal: She  exhibits tenderness. She exhibits no edema or deformity.   Tenderness of elbows on palpation (resolved)  Tenderness of mcps, pips (resolved)  Tenderness of both ankles (resolved)  Feet: tenderness of mtps  (resolved)            Labs: reviewed  Kacie-negative  Ccp,rf-negative   anticardiolipin Ig M-44  Beta-2 glycoprotein Ig M-130  LAC-negative        Arthritis survey (2018): I personally reviewed;I suspect acute or subacute nondisplaced fracture at the ulnar aspect of the proximal portion of the distal phalanx of the left ring finger extending to the articular surfac     Assessment:     64 year old female with PMH of hypothyroidism, pericardial effusion here for evaluation.  She presented with a bilateral symmetric arthritis consistent with seronegative RA.    1) RA: symptoms almost resolved on prednisone and returning with weaning off prednisone. Patient was doing well on MTX  8 pills a week but recently stopped it. Told patient if she stops it she has to communicate with me so I have plan to titrate the mtx.    Start mtx 4 pills a week for the first 2 weeks and then increase to 6 pills once a week\  Start  folic acid 1mg po qday  Labsin a month        2) pericardial effusion: reports history of pericardial effusion.  Work up negative.        3) abnormal APS antibodies: denies history of  clotting or miscarriage.  Can consider plaquenil given anti-thrombotic effects.     4) discuss dexa scan history at next visit.        Educated patient on Covid virus and prevention strategies.  The patient location is: home  The chief complaint leading to consultation is: joint pain  Visit type: Virtual visit with synchronous audio and video  Total time spent with patient: 20 minutes  Each patient to whom he or she provides medical services by telemedicine is:  (1) informed of the relationship between the physician and patient and the respective role of any other health care provider with respect to management of the patient; and  (2) notified that he or she may decline to receive medical services by telemedicine and may withdraw from such care at any time.

## 2020-04-03 ENCOUNTER — TELEPHONE (OUTPATIENT)
Dept: OBSTETRICS AND GYNECOLOGY | Facility: CLINIC | Age: 65
End: 2020-04-03

## 2020-04-06 ENCOUNTER — TELEPHONE (OUTPATIENT)
Dept: OBSTETRICS AND GYNECOLOGY | Facility: CLINIC | Age: 65
End: 2020-04-06

## 2020-04-06 NOTE — TELEPHONE ENCOUNTER
Pt was called and lmor for pt to return my call     Pt states that she has an appt for labs at [facility] for 4 PM today. Pt is unsure of if she wants to go into the hospital for labs. Pt offered [place] location, but states she does not feel comfortable going to either location. Pt would like to know if the labs are emergent or if they can wait. Pt also states that her rheumatologist states that she may need to stop taking her hormones due to her previous lab levels. Pt would like to discuss.

## 2020-04-06 NOTE — TELEPHONE ENCOUNTER
I spoke with the patient.  I explained that not one RCT study shows an increased risk of blood clot with estradiol or progesterone.  There is an increased risk if you take synthetic hormones such as Premarin or Prempro.  We also discussed the FDA has the black box warning about blood clots with testosterone use in men.  However if you look at randomized controlled trials there is no evidence that it increases the risk of blood clots.  The FDA placed that warrning based on a few observational studies that were not well done.  The latest guidelines of the American urological Society stated there was no increased risk of blood clots with testosterone therapy.

## 2020-04-15 ENCOUNTER — LAB VISIT (OUTPATIENT)
Dept: LAB | Facility: OTHER | Age: 65
End: 2020-04-15
Attending: OBSTETRICS & GYNECOLOGY
Payer: COMMERCIAL

## 2020-04-15 DIAGNOSIS — Z78.0 MENOPAUSE: ICD-10-CM

## 2020-04-15 DIAGNOSIS — Z13.21 ENCOUNTER FOR VITAMIN DEFICIENCY SCREENING: ICD-10-CM

## 2020-04-15 LAB
25(OH)D3+25(OH)D2 SERPL-MCNC: 32 NG/ML (ref 30–96)
ESTRADIOL SERPL-MCNC: 63 PG/ML
PROGEST SERPL-MCNC: 2.8 NG/ML
TESTOST SERPL-MCNC: 23 NG/DL (ref 5–73)
VIT B12 SERPL-MCNC: 386 PG/ML (ref 210–950)

## 2020-04-15 PROCEDURE — 82670 ASSAY OF TOTAL ESTRADIOL: CPT

## 2020-04-15 PROCEDURE — 82607 VITAMIN B-12: CPT

## 2020-04-15 PROCEDURE — 84144 ASSAY OF PROGESTERONE: CPT

## 2020-04-15 PROCEDURE — 82306 VITAMIN D 25 HYDROXY: CPT

## 2020-04-15 PROCEDURE — 84402 ASSAY OF FREE TESTOSTERONE: CPT

## 2020-04-15 PROCEDURE — 84403 ASSAY OF TOTAL TESTOSTERONE: CPT

## 2020-04-15 PROCEDURE — 36415 COLL VENOUS BLD VENIPUNCTURE: CPT

## 2020-04-17 ENCOUNTER — TELEPHONE (OUTPATIENT)
Dept: OBSTETRICS AND GYNECOLOGY | Facility: CLINIC | Age: 65
End: 2020-04-17

## 2020-04-17 ENCOUNTER — PATIENT MESSAGE (OUTPATIENT)
Dept: OBSTETRICS AND GYNECOLOGY | Facility: CLINIC | Age: 65
End: 2020-04-17

## 2020-04-20 LAB — TESTOST FREE SERPL-MCNC: 0.7 PG/ML

## 2020-04-21 ENCOUNTER — PATIENT MESSAGE (OUTPATIENT)
Dept: OBSTETRICS AND GYNECOLOGY | Facility: CLINIC | Age: 65
End: 2020-04-21

## 2020-04-21 DIAGNOSIS — Z78.0 MENOPAUSE: Primary | ICD-10-CM

## 2020-04-21 RX ORDER — PROGESTERONE 200 MG/1
CAPSULE ORAL
Qty: 90 CAPSULE | Refills: 3 | Status: SHIPPED | OUTPATIENT
Start: 2020-04-21 | End: 2020-12-07 | Stop reason: SDUPTHER

## 2020-04-21 RX ORDER — ESTRADIOL 2 MG/1
2 TABLET ORAL DAILY
Qty: 90 TABLET | Refills: 3 | Status: SHIPPED | OUTPATIENT
Start: 2020-04-21 | End: 2020-12-07 | Stop reason: SDUPTHER

## 2020-04-27 ENCOUNTER — LAB VISIT (OUTPATIENT)
Dept: LAB | Facility: OTHER | Age: 65
End: 2020-04-27
Attending: INTERNAL MEDICINE
Payer: COMMERCIAL

## 2020-04-27 DIAGNOSIS — M06.9 RHEUMATOID ARTHRITIS FLARE: ICD-10-CM

## 2020-04-27 LAB
ALBUMIN SERPL BCP-MCNC: 3.9 G/DL (ref 3.5–5.2)
ALP SERPL-CCNC: 88 U/L (ref 55–135)
ALT SERPL W/O P-5'-P-CCNC: 17 U/L (ref 10–44)
ANION GAP SERPL CALC-SCNC: 8 MMOL/L (ref 8–16)
AST SERPL-CCNC: 16 U/L (ref 10–40)
BASOPHILS # BLD AUTO: 0.04 K/UL (ref 0–0.2)
BASOPHILS NFR BLD: 0.6 % (ref 0–1.9)
BILIRUB SERPL-MCNC: 0.3 MG/DL (ref 0.1–1)
BUN SERPL-MCNC: 16 MG/DL (ref 8–23)
CALCIUM SERPL-MCNC: 8.7 MG/DL (ref 8.7–10.5)
CHLORIDE SERPL-SCNC: 109 MMOL/L (ref 95–110)
CO2 SERPL-SCNC: 25 MMOL/L (ref 23–29)
CREAT SERPL-MCNC: 0.8 MG/DL (ref 0.5–1.4)
CRP SERPL-MCNC: 0.6 MG/L (ref 0–8.2)
DIFFERENTIAL METHOD: ABNORMAL
EOSINOPHIL # BLD AUTO: 0.1 K/UL (ref 0–0.5)
EOSINOPHIL NFR BLD: 2 % (ref 0–8)
ERYTHROCYTE [DISTWIDTH] IN BLOOD BY AUTOMATED COUNT: 13 % (ref 11.5–14.5)
ERYTHROCYTE [SEDIMENTATION RATE] IN BLOOD: 12 MM/HR (ref 0–20)
EST. GFR  (AFRICAN AMERICAN): >60 ML/MIN/1.73 M^2
EST. GFR  (NON AFRICAN AMERICAN): >60 ML/MIN/1.73 M^2
GLUCOSE SERPL-MCNC: 69 MG/DL (ref 70–110)
HCT VFR BLD AUTO: 39.5 % (ref 37–48.5)
HGB BLD-MCNC: 12.4 G/DL (ref 12–16)
IMM GRANULOCYTES # BLD AUTO: 0.05 K/UL (ref 0–0.04)
IMM GRANULOCYTES NFR BLD AUTO: 0.7 % (ref 0–0.5)
LYMPHOCYTES # BLD AUTO: 1.8 K/UL (ref 1–4.8)
LYMPHOCYTES NFR BLD: 25.7 % (ref 18–48)
MCH RBC QN AUTO: 30.8 PG (ref 27–31)
MCHC RBC AUTO-ENTMCNC: 31.4 G/DL (ref 32–36)
MCV RBC AUTO: 98 FL (ref 82–98)
MONOCYTES # BLD AUTO: 0.6 K/UL (ref 0.3–1)
MONOCYTES NFR BLD: 8.2 % (ref 4–15)
NEUTROPHILS # BLD AUTO: 4.4 K/UL (ref 1.8–7.7)
NEUTROPHILS NFR BLD: 62.8 % (ref 38–73)
NRBC BLD-RTO: 0 /100 WBC
PLATELET # BLD AUTO: 265 K/UL (ref 150–350)
PMV BLD AUTO: 9.8 FL (ref 9.2–12.9)
POTASSIUM SERPL-SCNC: 4 MMOL/L (ref 3.5–5.1)
PROT SERPL-MCNC: 6.4 G/DL (ref 6–8.4)
RBC # BLD AUTO: 4.03 M/UL (ref 4–5.4)
SODIUM SERPL-SCNC: 142 MMOL/L (ref 136–145)
WBC # BLD AUTO: 7.04 K/UL (ref 3.9–12.7)

## 2020-04-27 PROCEDURE — 80053 COMPREHEN METABOLIC PANEL: CPT

## 2020-04-27 PROCEDURE — 86140 C-REACTIVE PROTEIN: CPT

## 2020-04-27 PROCEDURE — 85613 RUSSELL VIPER VENOM DILUTED: CPT

## 2020-04-27 PROCEDURE — 86147 CARDIOLIPIN ANTIBODY EA IG: CPT

## 2020-04-27 PROCEDURE — 36415 COLL VENOUS BLD VENIPUNCTURE: CPT

## 2020-04-27 PROCEDURE — 86146 BETA-2 GLYCOPROTEIN ANTIBODY: CPT

## 2020-04-27 PROCEDURE — 85025 COMPLETE CBC W/AUTO DIFF WBC: CPT

## 2020-04-27 PROCEDURE — 86038 ANTINUCLEAR ANTIBODIES: CPT

## 2020-04-27 PROCEDURE — 85651 RBC SED RATE NONAUTOMATED: CPT

## 2020-04-28 ENCOUNTER — TELEPHONE (OUTPATIENT)
Dept: RHEUMATOLOGY | Facility: CLINIC | Age: 65
End: 2020-04-28

## 2020-04-28 DIAGNOSIS — M06.9 RHEUMATOID ARTHRITIS INVOLVING MULTIPLE JOINTS: Primary | ICD-10-CM

## 2020-04-28 LAB
ANA SER QL IF: NORMAL
LA PPP-IMP: NEGATIVE

## 2020-04-28 NOTE — TELEPHONE ENCOUNTER
----- Message from Jacqui Strange MD sent at 4/28/2020  2:08 PM CDT -----  Please call her and tell her that her labs look good so far and she can increase the methotrexate to 8 pills once a week and do labs in a month.  Dr. FAIRBANKS

## 2020-05-01 LAB
B2 GLYCOPROT1 IGA SER QL: <9 SAU
B2 GLYCOPROT1 IGG SER QL: <9 SGU
B2 GLYCOPROT1 IGM SER QL: 138 SMU
CARDIOLIPIN IGG SER IA-ACNC: <9.4 GPL (ref 0–14.99)
CARDIOLIPIN IGM SER IA-ACNC: 86.1 MPL (ref 0–12.49)

## 2020-05-05 ENCOUNTER — PATIENT MESSAGE (OUTPATIENT)
Dept: RHEUMATOLOGY | Facility: CLINIC | Age: 65
End: 2020-05-05

## 2020-05-07 ENCOUNTER — TELEPHONE (OUTPATIENT)
Dept: OBSTETRICS AND GYNECOLOGY | Facility: CLINIC | Age: 65
End: 2020-05-07

## 2020-05-11 ENCOUNTER — PATIENT MESSAGE (OUTPATIENT)
Dept: RHEUMATOLOGY | Facility: CLINIC | Age: 65
End: 2020-05-11

## 2020-05-11 ENCOUNTER — TELEPHONE (OUTPATIENT)
Dept: OBSTETRICS AND GYNECOLOGY | Facility: CLINIC | Age: 65
End: 2020-05-11

## 2020-05-11 ENCOUNTER — PATIENT MESSAGE (OUTPATIENT)
Dept: OBSTETRICS AND GYNECOLOGY | Facility: CLINIC | Age: 65
End: 2020-05-11

## 2020-05-11 NOTE — TELEPHONE ENCOUNTER
Pt states when she first started taking Progesterone 200mg she was taking TWO 100mg capsules but when she picked up the new prescription she didn't realize they were 200mg instead of 100mg so she has been taking 400mg for the past 3 weeks.  She reports headaches, dizziness, fatigue, and sensitivity to sun.  She has been washing her hands/arms frequently and her hands/arms are dry, so she isn't sure if the sun sensitivity is from the over dosing of progesterone or increase hand washing.  She is applying lotion but it hasn't helped.    She does not like applying testosterone cream to labia and no longer wants to apply it there.  Requesting a testosterone cream to apply it on her thigh like her friends do.  Does not want injection.

## 2020-05-12 ENCOUNTER — TELEPHONE (OUTPATIENT)
Dept: DERMATOLOGY | Facility: CLINIC | Age: 65
End: 2020-05-12

## 2020-05-12 DIAGNOSIS — L85.3 DRY SKIN: Primary | ICD-10-CM

## 2020-05-12 NOTE — TELEPHONE ENCOUNTER
Called PT and explained that our next face to face clinic day would be 6/19. She chose to keep her current appointment time

## 2020-05-12 NOTE — TELEPHONE ENCOUNTER
----- Message from Jennifer Basurto sent at 5/12/2020  1:28 PM CDT -----  Contact: Pt   Reason:Pt would like to know if she can come to appt on tomorrow instead of 5/14    Communication: [phone]

## 2020-05-14 ENCOUNTER — TELEPHONE (OUTPATIENT)
Dept: PRIMARY CARE CLINIC | Facility: CLINIC | Age: 65
End: 2020-05-14

## 2020-05-14 ENCOUNTER — OFFICE VISIT (OUTPATIENT)
Dept: DERMATOLOGY | Facility: CLINIC | Age: 65
End: 2020-05-14
Payer: COMMERCIAL

## 2020-05-14 DIAGNOSIS — R20.2 PARESTHESIA: Primary | ICD-10-CM

## 2020-05-14 DIAGNOSIS — L85.3 DRY SKIN: ICD-10-CM

## 2020-05-14 PROCEDURE — 99999 PR PBB SHADOW E&M-EST. PATIENT-LVL III: ICD-10-PCS | Mod: PBBFAC,,, | Performed by: DERMATOLOGY

## 2020-05-14 PROCEDURE — 99202 PR OFFICE/OUTPT VISIT, NEW, LEVL II, 15-29 MIN: ICD-10-PCS | Mod: S$GLB,,, | Performed by: DERMATOLOGY

## 2020-05-14 PROCEDURE — 99999 PR PBB SHADOW E&M-EST. PATIENT-LVL III: CPT | Mod: PBBFAC,,, | Performed by: DERMATOLOGY

## 2020-05-14 PROCEDURE — 99202 OFFICE O/P NEW SF 15 MIN: CPT | Mod: S$GLB,,, | Performed by: DERMATOLOGY

## 2020-05-14 NOTE — PROGRESS NOTES
"  Subjective:       Patient ID:  Celeste Mohr is a 64 y.o. female who presents for   Chief Complaint   Patient presents with    Rash     hands, arms, burning feeling, 10 days      Rash  - Initial  Affected locations: right arm, left arm, left hand and right hand  Duration: 10 days  Signs / symptoms: burning ("on fire"; tingling, pins&needles; "ants crawling"; previously felt very sensitive to sunlight)  Severity: mild (has improved since yesterday)  Timing: constant  Aggravated by: heat and sunlight (used clorox wipes on her skin)  Relieving factors/Treatments tried: nothing      Review of Systems   Musculoskeletal:        Denies current neck pain   Skin: Positive for sun sensitivity. Negative for itching and rash.        Objective:    Physical Exam   Constitutional: She appears well-developed and well-nourished. No distress.   Eyes: Lids are normal.  No conjunctival no injection.   Musculoskeletal:        Cervical back: Normal. She exhibits no tenderness, no bony tenderness, no deformity, no pain and no spasm.   Neurological: She is alert and oriented to person, place, and time. She is not disoriented.   Psychiatric: She has a normal mood and affect.   Skin:   Areas Examined (abnormalities noted in diagram):   Head / Face Inspection Performed  Neck Inspection Performed  Chest / Axilla Inspection Performed  RUE Inspected  LUE Inspection Performed  Nails and Digits Inspection Performed             Diagram Legend     Erythematous scaling macule/papule c/w actinic keratosis       Vascular papule c/w angioma      Pigmented verrucoid papule/plaque c/w seborrheic keratosis      Yellow umbilicated papule c/w sebaceous hyperplasia      Irregularly shaped tan macule c/w lentigo     1-2 mm smooth white papules consistent with Milia      Movable subcutaneous cyst with punctum c/w epidermal inclusion cyst      Subcutaneous movable cyst c/w pilar cyst      Firm pink to brown papule c/w dermatofibroma      Pedunculated " fleshy papule(s) c/w skin tag(s)      Evenly pigmented macule c/w junctional nevus     Mildly variegated pigmented, slightly irregular-bordered macule c/w mildly atypical nevus      Flesh colored to evenly pigmented papule c/w intradermal nevus       Pink pearly papule/plaque c/w basal cell carcinoma      Erythematous hyperkeratotic cursted plaque c/w SCC      Surgical scar with no sign of skin cancer recurrence      Open and closed comedones      Inflammatory papules and pustules      Verrucoid papule consistent consistent with wart     Erythematous eczematous patches and plaques     Dystrophic onycholytic nail with subungual debris c/w onychomycosis     Umbilicated papule    Erythematous-base heme-crusted tan verrucoid plaque consistent with inflamed seborrheic keratosis     Erythematous Silvery Scaling Plaque c/w Psoriasis     See annotation      Assessment / Plan:        Paresthesia  -     Ambulatory referral/consult to Internal Medicine; Future; Expected date: 05/21/2020  Patient's symptoms appear to be most consistent with a neuropathy, as there are no epidermal changes. Rec'd that she see her PCP for further advise and/or workup. She needs a new PCP, so referral was placed.    Follow up if symptoms worsen or fail to improve.

## 2020-05-14 NOTE — LETTER
May 14, 2020      Jacqui Strange MD  [address]  [address]  [place] [address]           [facility]  [address]  [place] [address]  Phone: [phone]  Fax: [phone]          Patient: Celeste Mohr   MR Number: [id number]   YOB: 1955   Date of Visit: 5/14/2020       Dear Dr. Jacqui Strange:    Thank you for referring Celeste Mohr to me for evaluation. Attached you will find relevant portions of my assessment and plan of care.    If you have questions, please do not hesitate to call me. I look forward to following Celeste Mohr along with you.    Sincerely,    Rocio Castillo MD    Enclosure  CC:  No Recipients    If you would like to receive this communication electronically, please contact [email or web address] or [phone] to request more information on [employer] Link access.    For providers and/or their staff who would like to refer a patient to [facility], please contact us through our one-stop-shop provider referral line, [facility], at [phone].    If you feel you have received this communication in error or would no longer like to receive these types of communications, please e-mail [email or web address]

## 2020-05-14 NOTE — TELEPHONE ENCOUNTER
----- Message from Clark Hughes MA sent at 5/14/2020  3:55 PM CDT -----  Please help schedule patient. She seems to have some type of nerve issue. Rheum referred her to us but she has no rash.     Thank you!

## 2020-05-19 ENCOUNTER — TELEPHONE (OUTPATIENT)
Dept: RHEUMATOLOGY | Facility: CLINIC | Age: 65
End: 2020-05-19

## 2020-05-19 ENCOUNTER — PATIENT MESSAGE (OUTPATIENT)
Dept: RHEUMATOLOGY | Facility: CLINIC | Age: 65
End: 2020-05-19

## 2020-05-19 ENCOUNTER — TELEPHONE (OUTPATIENT)
Dept: OBSTETRICS AND GYNECOLOGY | Facility: CLINIC | Age: 65
End: 2020-05-19

## 2020-05-19 NOTE — TELEPHONE ENCOUNTER
Spoke to patient on telephone and sent her a message via portal with Dr Strange's response      ----- Message from Jacqui Strange MD sent at 5/19/2020 12:26 PM CDT -----  Contact: pt  Tell her if she feels lightheaded she needs to go to urgent care.  Tell her also to make appointment with pcp asap for this.    Dr. Strange  ----- Message -----  From: Beth Stevens RN  Sent: 5/19/2020  12:02 PM CDT  To: Jacqui Strange MD    Do you want to order?      ----- Message -----  From: Sandi Hall  Sent: 5/19/2020  11:50 AM CDT  To: Alexandr Osman Staff    Pt would like to receive a call back regarding if she can also get a thyroid test. Please contact the pt to advise.  Pt states she has been feeling lightheaded.     Contact info

## 2020-05-19 NOTE — TELEPHONE ENCOUNTER
"Pt c/o Tingling in skin, Lightheaded, emotional and feeling "off".  She had been taking Progesterone 400mg because she was used to taking 2 100mg capsules so when prescription was increased she continued to do so.  She decrease dose to 200mg as prescribed about a week ago which is when symptoms started.  Recommended she continue taking 200mg until hormone f/u on 6/1.  Declined sooner appt. Recommended she call PCP or establish care with PCP.  Verbalized understanding.    "

## 2020-05-21 ENCOUNTER — TELEPHONE (OUTPATIENT)
Dept: OBSTETRICS AND GYNECOLOGY | Facility: CLINIC | Age: 65
End: 2020-05-21

## 2020-05-22 NOTE — TELEPHONE ENCOUNTER
Spoke with pt and she thinks the hormones are causing her tingling and lightheadedness.  Pt is getting labs done which was ordered by her rheumatologist.  Hormone follow up scheduled Tuesday.

## 2020-05-28 ENCOUNTER — LAB VISIT (OUTPATIENT)
Dept: LAB | Facility: HOSPITAL | Age: 65
End: 2020-05-28
Attending: INTERNAL MEDICINE
Payer: COMMERCIAL

## 2020-05-28 ENCOUNTER — PATIENT MESSAGE (OUTPATIENT)
Dept: RHEUMATOLOGY | Facility: CLINIC | Age: 65
End: 2020-05-28

## 2020-05-28 DIAGNOSIS — M06.9 RHEUMATOID ARTHRITIS INVOLVING MULTIPLE JOINTS: ICD-10-CM

## 2020-05-28 LAB
ALBUMIN SERPL BCP-MCNC: 3.8 G/DL (ref 3.5–5.2)
ALBUMIN SERPL BCP-MCNC: 3.8 G/DL (ref 3.5–5.2)
ALP SERPL-CCNC: 84 U/L (ref 55–135)
ALP SERPL-CCNC: 84 U/L (ref 55–135)
ALT SERPL W/O P-5'-P-CCNC: 14 U/L (ref 10–44)
ALT SERPL W/O P-5'-P-CCNC: 14 U/L (ref 10–44)
ANION GAP SERPL CALC-SCNC: 7 MMOL/L (ref 8–16)
ANION GAP SERPL CALC-SCNC: 7 MMOL/L (ref 8–16)
AST SERPL-CCNC: 20 U/L (ref 10–40)
AST SERPL-CCNC: 20 U/L (ref 10–40)
BASOPHILS # BLD AUTO: 0.07 K/UL (ref 0–0.2)
BASOPHILS # BLD AUTO: 0.07 K/UL (ref 0–0.2)
BASOPHILS NFR BLD: 1 % (ref 0–1.9)
BASOPHILS NFR BLD: 1 % (ref 0–1.9)
BILIRUB SERPL-MCNC: 0.3 MG/DL (ref 0.1–1)
BILIRUB SERPL-MCNC: 0.3 MG/DL (ref 0.1–1)
BUN SERPL-MCNC: 16 MG/DL (ref 8–23)
BUN SERPL-MCNC: 16 MG/DL (ref 8–23)
CALCIUM SERPL-MCNC: 9 MG/DL (ref 8.7–10.5)
CALCIUM SERPL-MCNC: 9 MG/DL (ref 8.7–10.5)
CHLORIDE SERPL-SCNC: 109 MMOL/L (ref 95–110)
CHLORIDE SERPL-SCNC: 109 MMOL/L (ref 95–110)
CO2 SERPL-SCNC: 25 MMOL/L (ref 23–29)
CO2 SERPL-SCNC: 25 MMOL/L (ref 23–29)
CREAT SERPL-MCNC: 0.8 MG/DL (ref 0.5–1.4)
CREAT SERPL-MCNC: 0.8 MG/DL (ref 0.5–1.4)
CRP SERPL-MCNC: 1.1 MG/L (ref 0–8.2)
CRP SERPL-MCNC: 1.1 MG/L (ref 0–8.2)
DIFFERENTIAL METHOD: ABNORMAL
DIFFERENTIAL METHOD: ABNORMAL
EOSINOPHIL # BLD AUTO: 0.1 K/UL (ref 0–0.5)
EOSINOPHIL # BLD AUTO: 0.1 K/UL (ref 0–0.5)
EOSINOPHIL NFR BLD: 1.6 % (ref 0–8)
EOSINOPHIL NFR BLD: 1.6 % (ref 0–8)
ERYTHROCYTE [DISTWIDTH] IN BLOOD BY AUTOMATED COUNT: 12.6 % (ref 11.5–14.5)
ERYTHROCYTE [DISTWIDTH] IN BLOOD BY AUTOMATED COUNT: 12.6 % (ref 11.5–14.5)
ERYTHROCYTE [SEDIMENTATION RATE] IN BLOOD BY WESTERGREN METHOD: <2 MM/HR (ref 0–36)
ERYTHROCYTE [SEDIMENTATION RATE] IN BLOOD BY WESTERGREN METHOD: <2 MM/HR (ref 0–36)
EST. GFR  (AFRICAN AMERICAN): >60 ML/MIN/1.73 M^2
EST. GFR  (AFRICAN AMERICAN): >60 ML/MIN/1.73 M^2
EST. GFR  (NON AFRICAN AMERICAN): >60 ML/MIN/1.73 M^2
EST. GFR  (NON AFRICAN AMERICAN): >60 ML/MIN/1.73 M^2
GLUCOSE SERPL-MCNC: 81 MG/DL (ref 70–110)
GLUCOSE SERPL-MCNC: 81 MG/DL (ref 70–110)
HCT VFR BLD AUTO: 36.9 % (ref 37–48.5)
HCT VFR BLD AUTO: 36.9 % (ref 37–48.5)
HGB BLD-MCNC: 11.8 G/DL (ref 12–16)
HGB BLD-MCNC: 11.8 G/DL (ref 12–16)
IMM GRANULOCYTES # BLD AUTO: 0.02 K/UL (ref 0–0.04)
IMM GRANULOCYTES # BLD AUTO: 0.02 K/UL (ref 0–0.04)
IMM GRANULOCYTES NFR BLD AUTO: 0.3 % (ref 0–0.5)
IMM GRANULOCYTES NFR BLD AUTO: 0.3 % (ref 0–0.5)
LYMPHOCYTES # BLD AUTO: 1.9 K/UL (ref 1–4.8)
LYMPHOCYTES # BLD AUTO: 1.9 K/UL (ref 1–4.8)
LYMPHOCYTES NFR BLD: 27.8 % (ref 18–48)
LYMPHOCYTES NFR BLD: 27.8 % (ref 18–48)
MCH RBC QN AUTO: 31.6 PG (ref 27–31)
MCH RBC QN AUTO: 31.6 PG (ref 27–31)
MCHC RBC AUTO-ENTMCNC: 32 G/DL (ref 32–36)
MCHC RBC AUTO-ENTMCNC: 32 G/DL (ref 32–36)
MCV RBC AUTO: 99 FL (ref 82–98)
MCV RBC AUTO: 99 FL (ref 82–98)
MONOCYTES # BLD AUTO: 0.5 K/UL (ref 0.3–1)
MONOCYTES # BLD AUTO: 0.5 K/UL (ref 0.3–1)
MONOCYTES NFR BLD: 7.3 % (ref 4–15)
MONOCYTES NFR BLD: 7.3 % (ref 4–15)
NEUTROPHILS # BLD AUTO: 4.2 K/UL (ref 1.8–7.7)
NEUTROPHILS # BLD AUTO: 4.2 K/UL (ref 1.8–7.7)
NEUTROPHILS NFR BLD: 62 % (ref 38–73)
NEUTROPHILS NFR BLD: 62 % (ref 38–73)
NRBC BLD-RTO: 0 /100 WBC
NRBC BLD-RTO: 0 /100 WBC
PLATELET # BLD AUTO: 254 K/UL (ref 150–350)
PLATELET # BLD AUTO: 254 K/UL (ref 150–350)
PMV BLD AUTO: 10.8 FL (ref 9.2–12.9)
PMV BLD AUTO: 10.8 FL (ref 9.2–12.9)
POTASSIUM SERPL-SCNC: 4 MMOL/L (ref 3.5–5.1)
POTASSIUM SERPL-SCNC: 4 MMOL/L (ref 3.5–5.1)
PROT SERPL-MCNC: 6.3 G/DL (ref 6–8.4)
PROT SERPL-MCNC: 6.3 G/DL (ref 6–8.4)
RBC # BLD AUTO: 3.73 M/UL (ref 4–5.4)
RBC # BLD AUTO: 3.73 M/UL (ref 4–5.4)
SODIUM SERPL-SCNC: 141 MMOL/L (ref 136–145)
SODIUM SERPL-SCNC: 141 MMOL/L (ref 136–145)
WBC # BLD AUTO: 6.81 K/UL (ref 3.9–12.7)
WBC # BLD AUTO: 6.81 K/UL (ref 3.9–12.7)

## 2020-05-28 PROCEDURE — 85652 RBC SED RATE AUTOMATED: CPT

## 2020-05-28 PROCEDURE — 86140 C-REACTIVE PROTEIN: CPT

## 2020-05-28 PROCEDURE — 80053 COMPREHEN METABOLIC PANEL: CPT

## 2020-05-28 PROCEDURE — 85025 COMPLETE CBC W/AUTO DIFF WBC: CPT

## 2020-05-28 PROCEDURE — 36415 COLL VENOUS BLD VENIPUNCTURE: CPT

## 2020-05-29 ENCOUNTER — TELEPHONE (OUTPATIENT)
Dept: OBSTETRICS AND GYNECOLOGY | Facility: CLINIC | Age: 65
End: 2020-05-29

## 2020-05-29 NOTE — TELEPHONE ENCOUNTER
Spoke with pt and she has been having a slight discharge for a few weeks. She is scheduled to see Dr. Ordaz on Monday.     Informed pt to mention it at her appt. Monday.  Pt verbalized understanding.

## 2020-05-31 NOTE — PROGRESS NOTES
Subjective:      Celeste Mohr is a 64 y.o. female who is here for follow-up of hormone replacement therapy.  At her last visit on 3/2/2020, she complained hot flashes, insomnia, moodiness, no energy, vaginal dryness, anxiety, decreased libido, depression, dry skin, memory loss, brain fog, trouble concentrating, and joint pain.  The patient had never been on hormone replacement therapy. Patient denied post-menopausal vaginal bleeding. The patient is not currently sexually active.  She denied the following contraindications to HRT:  Vaginal bleeding, history of VTE/PE, thrombophilia,  breast cancer, or active liver disease.       PLAN on 3/2/2020:  Start:              Estradiol 2 mg orally QAM.              Progesterone 200 mg orally QPM              Testosterone cream 2%.   Discussed:                          Vitamin D and Vitamin B12 recommendations after lab results  Will draw labs after insurance change.    She started the [employer] Vitamin D3 5,000 IU daily with dinner and [employer] Vitamin B12 1000 mcg daily.   She complains of a vaginal discharge that is gray-white with no odor x a few weeks.  It is not itchy.  She is not currently sexually active. The patient states the following symptoms have improved: hot flashes, insomnia, moodiness and vaginal dryness.  She had the following side effects: none now that taking correct dose.  Patient denies post-menopausal vaginal bleeding.  Denies rectal bleeding.    Lab Visit on 05/28/2020   Component Date Value Ref Range Status    WBC 05/28/2020 6.81  3.90 - 12.70 K/uL Final    RBC 05/28/2020 3.73* 4.00 - 5.40 M/uL Final    Hemoglobin 05/28/2020 11.8* 12.0 - 16.0 g/dL Final    Hematocrit 05/28/2020 36.9* 37.0 - 48.5 % Final    Mean Corpuscular Volume 05/28/2020 99* 82 - 98 fL Final    Mean Corpuscular Hemoglobin 05/28/2020 31.6* 27.0 - 31.0 pg Final    Mean Corpuscular Hemoglobin Conc 05/28/2020 32.0  32.0 - 36.0 g/dL Final    RDW 05/28/2020 12.6  11.5 -  14.5 % Final    Platelets 05/28/2020 254  150 - 350 K/uL Final    MPV 05/28/2020 10.8  9.2 - 12.9 fL Final    Immature Granulocytes 05/28/2020 0.3  0.0 - 0.5 % Final    Gran # (ANC) 05/28/2020 4.2  1.8 - 7.7 K/uL Final    Immature Grans (Abs) 05/28/2020 0.02  0.00 - 0.04 K/uL Final    Lymph # 05/28/2020 1.9  1.0 - 4.8 K/uL Final    Mono # 05/28/2020 0.5  0.3 - 1.0 K/uL Final    Eos # 05/28/2020 0.1  0.0 - 0.5 K/uL Final    Baso # 05/28/2020 0.07  0.00 - 0.20 K/uL Final    nRBC 05/28/2020 0  0 /100 WBC Final    Gran% 05/28/2020 62.0  38.0 - 73.0 % Final    Lymph% 05/28/2020 27.8  18.0 - 48.0 % Final    Mono% 05/28/2020 7.3  4.0 - 15.0 % Final    Eosinophil% 05/28/2020 1.6  0.0 - 8.0 % Final    Basophil% 05/28/2020 1.0  0.0 - 1.9 % Final    Differential Method 05/28/2020 Automated   Final    Sodium 05/28/2020 141  136 - 145 mmol/L Final    Potassium 05/28/2020 4.0  3.5 - 5.1 mmol/L Final    Chloride 05/28/2020 109  95 - 110 mmol/L Final    CO2 05/28/2020 25  23 - 29 mmol/L Final    Glucose 05/28/2020 81  70 - 110 mg/dL Final    BUN, Bld 05/28/2020 16  8 - 23 mg/dL Final    Creatinine 05/28/2020 0.8  0.5 - 1.4 mg/dL Final    Calcium 05/28/2020 9.0  8.7 - 10.5 mg/dL Final    Total Protein 05/28/2020 6.3  6.0 - 8.4 g/dL Final    Albumin 05/28/2020 3.8  3.5 - 5.2 g/dL Final    Total Bilirubin 05/28/2020 0.3  0.1 - 1.0 mg/dL Final    Alkaline Phosphatase 05/28/2020 84  55 - 135 U/L Final    AST 05/28/2020 20  10 - 40 U/L Final    ALT 05/28/2020 14  10 - 44 U/L Final    Anion Gap 05/28/2020 7* 8 - 16 mmol/L Final    eGFR if African American 05/28/2020 >60.0  >60 mL/min/1.73 m^2 Final    eGFR if non African American 05/28/2020 >60.0  >60 mL/min/1.73 m^2 Final    CRP 05/28/2020 1.1  0.0 - 8.2 mg/L Final    Sed Rate 05/28/2020 <2  0 - 36 mm/Hr Final    WBC 05/28/2020 6.81  3.90 - 12.70 K/uL Final    RBC 05/28/2020 3.73* 4.00 - 5.40 M/uL Final    Hemoglobin 05/28/2020 11.8* 12.0 - 16.0 g/dL  Final    Hematocrit 05/28/2020 36.9* 37.0 - 48.5 % Final    Mean Corpuscular Volume 05/28/2020 99* 82 - 98 fL Final    Mean Corpuscular Hemoglobin 05/28/2020 31.6* 27.0 - 31.0 pg Final    Mean Corpuscular Hemoglobin Conc 05/28/2020 32.0  32.0 - 36.0 g/dL Final    RDW 05/28/2020 12.6  11.5 - 14.5 % Final    Platelets 05/28/2020 254  150 - 350 K/uL Final    MPV 05/28/2020 10.8  9.2 - 12.9 fL Final    Immature Granulocytes 05/28/2020 0.3  0.0 - 0.5 % Final    Gran # (ANC) 05/28/2020 4.2  1.8 - 7.7 K/uL Final    Immature Grans (Abs) 05/28/2020 0.02  0.00 - 0.04 K/uL Final    Lymph # 05/28/2020 1.9  1.0 - 4.8 K/uL Final    Mono # 05/28/2020 0.5  0.3 - 1.0 K/uL Final    Eos # 05/28/2020 0.1  0.0 - 0.5 K/uL Final    Baso # 05/28/2020 0.07  0.00 - 0.20 K/uL Final    nRBC 05/28/2020 0  0 /100 WBC Final    Gran% 05/28/2020 62.0  38.0 - 73.0 % Final    Lymph% 05/28/2020 27.8  18.0 - 48.0 % Final    Mono% 05/28/2020 7.3  4.0 - 15.0 % Final    Eosinophil% 05/28/2020 1.6  0.0 - 8.0 % Final    Basophil% 05/28/2020 1.0  0.0 - 1.9 % Final    Differential Method 05/28/2020 Automated   Final    Sodium 05/28/2020 141  136 - 145 mmol/L Final    Potassium 05/28/2020 4.0  3.5 - 5.1 mmol/L Final    Chloride 05/28/2020 109  95 - 110 mmol/L Final    CO2 05/28/2020 25  23 - 29 mmol/L Final    Glucose 05/28/2020 81  70 - 110 mg/dL Final    BUN, Bld 05/28/2020 16  8 - 23 mg/dL Final    Creatinine 05/28/2020 0.8  0.5 - 1.4 mg/dL Final    Calcium 05/28/2020 9.0  8.7 - 10.5 mg/dL Final    Total Protein 05/28/2020 6.3  6.0 - 8.4 g/dL Final    Albumin 05/28/2020 3.8  3.5 - 5.2 g/dL Final    Total Bilirubin 05/28/2020 0.3  0.1 - 1.0 mg/dL Final    Alkaline Phosphatase 05/28/2020 84  55 - 135 U/L Final    AST 05/28/2020 20  10 - 40 U/L Final    ALT 05/28/2020 14  10 - 44 U/L Final    Anion Gap 05/28/2020 7* 8 - 16 mmol/L Final    eGFR if African American 05/28/2020 >60.0  >60 mL/min/1.73 m^2 Final    eGFR if non   05/28/2020 >60.0  >60 mL/min/1.73 m^2 Final    CRP 05/28/2020 1.1  0.0 - 8.2 mg/L Final    Sed Rate 05/28/2020 <2  0 - 36 mm/Hr Final   Lab Visit on 04/27/2020   Component Date Value Ref Range Status    WBC 04/27/2020 7.04  3.90 - 12.70 K/uL Final    RBC 04/27/2020 4.03  4.00 - 5.40 M/uL Final    Hemoglobin 04/27/2020 12.4  12.0 - 16.0 g/dL Final    Hematocrit 04/27/2020 39.5  37.0 - 48.5 % Final    Mean Corpuscular Volume 04/27/2020 98  82 - 98 fL Final    Mean Corpuscular Hemoglobin 04/27/2020 30.8  27.0 - 31.0 pg Final    Mean Corpuscular Hemoglobin Conc 04/27/2020 31.4* 32.0 - 36.0 g/dL Final    RDW 04/27/2020 13.0  11.5 - 14.5 % Final    Platelets 04/27/2020 265  150 - 350 K/uL Final    MPV 04/27/2020 9.8  9.2 - 12.9 fL Final    Immature Granulocytes 04/27/2020 0.7* 0.0 - 0.5 % Final    Gran # (ANC) 04/27/2020 4.4  1.8 - 7.7 K/uL Final    Immature Grans (Abs) 04/27/2020 0.05* 0.00 - 0.04 K/uL Final    Lymph # 04/27/2020 1.8  1.0 - 4.8 K/uL Final    Mono # 04/27/2020 0.6  0.3 - 1.0 K/uL Final    Eos # 04/27/2020 0.1  0.0 - 0.5 K/uL Final    Baso # 04/27/2020 0.04  0.00 - 0.20 K/uL Final    nRBC 04/27/2020 0  0 /100 WBC Final    Gran% 04/27/2020 62.8  38.0 - 73.0 % Final    Lymph% 04/27/2020 25.7  18.0 - 48.0 % Final    Mono% 04/27/2020 8.2  4.0 - 15.0 % Final    Eosinophil% 04/27/2020 2.0  0.0 - 8.0 % Final    Basophil% 04/27/2020 0.6  0.0 - 1.9 % Final    Differential Method 04/27/2020 Automated   Final    Sodium 04/27/2020 142  136 - 145 mmol/L Final    Potassium 04/27/2020 4.0  3.5 - 5.1 mmol/L Final    Chloride 04/27/2020 109  95 - 110 mmol/L Final    CO2 04/27/2020 25  23 - 29 mmol/L Final    Glucose 04/27/2020 69* 70 - 110 mg/dL Final    BUN, Bld 04/27/2020 16  8 - 23 mg/dL Final    Creatinine 04/27/2020 0.8  0.5 - 1.4 mg/dL Final    Calcium 04/27/2020 8.7  8.7 - 10.5 mg/dL Final    Total Protein 04/27/2020 6.4  6.0 - 8.4 g/dL Final    Albumin 04/27/2020  3.9  3.5 - 5.2 g/dL Final    Total Bilirubin 04/27/2020 0.3  0.1 - 1.0 mg/dL Final    Alkaline Phosphatase 04/27/2020 88  55 - 135 U/L Final    AST 04/27/2020 16  10 - 40 U/L Final    ALT 04/27/2020 17  10 - 44 U/L Final    Anion Gap 04/27/2020 8  8 - 16 mmol/L Final    eGFR if African American 04/27/2020 >60  >60 mL/min/1.73 m^2 Final    eGFR if non African American 04/27/2020 >60  >60 mL/min/1.73 m^2 Final    CRP 04/27/2020 0.6  0.0 - 8.2 mg/L Final    Sed Rate 04/27/2020 12  0 - 20 mm/Hr Final    Beta-2 Glyco 1 IgG 04/27/2020 <9  <=20 SGU Final    Beta-2 Glyco 1 IgM 04/27/2020 138* <=20 SMU Final    Beta-2 Glyco 1 IgA 04/27/2020 <9  <=20 VALENCIA Final    DRVVT, Lupus Anticoagulant 04/27/2020 Negative  Negative Final    APA Isotype IgG 04/27/2020 <9.40  0.00 - 14.99 GPL Corrected    APA Isotype IgM 04/27/2020 86.10* 0.00 - 12.49 MPL Corrected    MIMI Screen 04/27/2020 Negative <1:80  Negative <1:80 Final   Lab Visit on 04/15/2020   Component Date Value Ref Range Status    Estradiol 04/15/2020 63  See Text pg/mL Final    Progesterone 04/15/2020 2.8  See Text ng/mL Final    Testosterone, Total 04/15/2020 23  5 - 73 ng/dL Final    Testosterone, Free 04/15/2020 0.7  pg/mL Final    Vitamin B-12 04/15/2020 386  210 - 950 pg/mL Final    Vit D, 25-Hydroxy 04/15/2020 32  30 - 96 ng/mL Final       Past Medical History:   Diagnosis Date    Genital herpes     Hormone replacement therapy (HRT)     Hypothyroid     Lichen sclerosus     Menopause 2016    Rheumatoid arthritis     Seasonal allergies      Past Surgical History:   Procedure Laterality Date    FACIAL COSMETIC SURGERY      REDUCTION OF BOTH BREASTS  1988    VAGINAL DELIVERY  1992, 1995     Social History     Tobacco Use    Smoking status: Former Smoker    Smokeless tobacco: Never Used    Tobacco comment: in high school /college    Substance Use Topics    Alcohol use: Yes     Comment: Social     Drug use: No     Family History   Problem  Relation Age of Onset    Colon cancer Mother     Alzheimer's disease Father     Ovarian cancer Sister     No Known Problems Brother     No Known Problems Maternal Aunt     No Known Problems Maternal Uncle     No Known Problems Paternal Aunt     No Known Problems Paternal Uncle     No Known Problems Maternal Grandmother     No Known Problems Maternal Grandfather     No Known Problems Paternal Grandmother     No Known Problems Paternal Grandfather     Blindness Neg Hx     Cataracts Neg Hx     Diabetes Neg Hx     Glaucoma Neg Hx     Amblyopia Neg Hx     Hypertension Neg Hx     Macular degeneration Neg Hx     Retinal detachment Neg Hx     Strabismus Neg Hx     Stroke Neg Hx     Thyroid disease Neg Hx     Breast cancer Neg Hx     Melanoma Neg Hx      OB History    Para Term  AB Living   3 2 2   1 2   SAB TAB Ectopic Multiple Live Births     1     2      # Outcome Date GA Lbr Antonio/2nd Weight Sex Delivery Anes PTL Lv   3 Term  40w0d  3.629 kg (8 lb) M Vag-Spont   MAYLIN   2 Term  40w0d  3.175 kg (7 lb) F Vag-Spont   MAYLIN   1 TAB      TAB         Obstetric Comments   Menopause ~ 60   Menarche ~ 18       Current Outpatient Medications:     AMITIZA 24 mcg Cap, TAKE ONE CAPSULE BY MOUTH once OR twice day, Disp: , Rfl: 0    benzonatate (TESSALON) 100 MG capsule, , Disp: , Rfl:     butalbital-aspirin-caffeine -40 mg (FIORINAL) -40 mg Cap, Take 1 capsule by mouth every 4 (four) hours as needed., Disp: , Rfl:     cyanocobalamin (VITAMIN B-12) 1000 MCG tablet, Take 100 mcg by mouth., Disp: , Rfl:     ergocalciferol (ERGOCALCIFEROL) 50,000 unit Cap, Take 50,000 Units by mouth., Disp: , Rfl:     estradioL (ESTRACE) 2 MG tablet, Take 1 tablet (2 mg total) by mouth once daily., Disp: 90 tablet, Rfl: 3    fluticasone propionate (FLONASE) 50 mcg/actuation nasal spray, 2 sprays (100 mcg total) by Each Nare route once daily., Disp: 1 Bottle, Rfl: 8    folic acid (FOLVITE) 1 MG  "tablet, Take 1 tablet (1 mg total) by mouth once daily., Disp: 90 tablet, Rfl: 4    levothyroxine (SYNTHROID) 150 MCG tablet, , Disp: , Rfl:     LORazepam (ATIVAN) 0.5 MG tablet, Take 0.5 mg by mouth 2 (two) times daily., Disp: , Rfl: 5    methotrexate 2.5 MG Tab, Take 8 tablets (20 mg total) by mouth every 7 days., Disp: 96 tablet, Rfl: 0    metroNIDAZOLE (METROGEL VAGINAL) 0.75 % vaginal gel, One applicatorful vaginally at bedtime x 5 nights; use once weekly thereafter until tube empty., Disp: 70 g, Rfl: 0    progesterone (PROMETRIUM) 200 MG capsule, Take 1 capsule by mouth 30-60 minutes before bed every night, Disp: 90 capsule, Rfl: 3    sertraline (ZOLOFT) 50 MG tablet, Take 50 mg by mouth once daily., Disp: , Rfl: 11    testosterone (ANDROGEL) 20.25 mg/1.25 gram (1.62 %) GlPm, Place 20.25 mg onto the skin., Disp: , Rfl:     valACYclovir (VALTREX) 500 MG tablet, At onset of outbreak, take 1 tablet (1,000 mg) by mouth twice daily for 7-10 days., Disp: 60 tablet, Rfl: 2    zolpidem (AMBIEN) 10 mg Tab, Take 10 mg by mouth nightly., Disp: , Rfl: 5    azelastine (ASTELIN) 137 mcg (0.1 %) nasal spray, 2 sprays (274 mcg total) by Nasal route 2 (two) times daily. (Patient not taking: Reported on 5/14/2020), Disp: 30 mL, Rfl: 0    Review of Systems:  General: No fever, chills, or weight loss.  Chest: No chest pain, shortness of breath, or palpitations.  Breast: No pain, masses, or nipple discharge.  Vulva: No pain, lesions, or itching.  Vagina: No relaxation, itching, discharge, or lesions.  Abdomen: No pain, nausea, vomiting, diarrhea, or constipation.  Urinary: No incontinence, nocturia, frequency, or dysuria.  Extremities:  No leg cramps, edema, or calf pain.  Neurologic: No headaches, dizziness, or visual changes.    Vitals:    06/01/20 0900   BP: 114/74   Weight: 54.5 kg (120 lb 2.4 oz)   Height: 5' 4" (1.626 m)   PainSc: 0-No pain     Body mass index is 20.62 kg/m².    External genitalia:  No lesions, " erythema, rashes, or other abnormalities.  Urethra:  No lesions or discharge.  Vagina:  No lesions or tenderness.  Minimal whit discharge    KOH and Wet Prep negative    Assessment:    Menopause  -     Progesterone; Future; Expected date: 06/01/2020    Vaginal discharge    - physiologic  Plan:   Risks and benefits of hormone replacement therapy were discussed.  Hormone replacement therapy options, including bioidentical versus non-bioidentical hormones, as well as alternatives discussed.    Continue  Estradiol 2 mg orally QAM.  Progesterone 200 mg orally QPM  Testosterone cream 2%.  Patient is aware this is off-label use in women, and FDA black box warnings were reviewed.  KOH and Wet prep done  Recommend f/u with PCP for anemia.  Colonoscopy discussed.  Recheck progesterone 5 hours after taking in AM  Follow up in 6 months.  Instructed patient to call if she experiences any side effects or has any questions.  I spent 25 minutes with [facility] patient today, >50% counseling.

## 2020-06-01 ENCOUNTER — OFFICE VISIT (OUTPATIENT)
Dept: OBSTETRICS AND GYNECOLOGY | Facility: CLINIC | Age: 65
End: 2020-06-01
Attending: OBSTETRICS & GYNECOLOGY
Payer: COMMERCIAL

## 2020-06-01 VITALS
BODY MASS INDEX: 20.51 KG/M2 | HEIGHT: 64 IN | SYSTOLIC BLOOD PRESSURE: 114 MMHG | WEIGHT: 120.13 LBS | DIASTOLIC BLOOD PRESSURE: 74 MMHG

## 2020-06-01 DIAGNOSIS — N89.8 VAGINAL DISCHARGE: ICD-10-CM

## 2020-06-01 DIAGNOSIS — Z78.0 MENOPAUSE: Primary | ICD-10-CM

## 2020-06-01 PROCEDURE — 3008F BODY MASS INDEX DOCD: CPT | Mod: CPTII,S$GLB,, | Performed by: OBSTETRICS & GYNECOLOGY

## 2020-06-01 PROCEDURE — 99999 PR PBB SHADOW E&M-EST. PATIENT-LVL III: CPT | Mod: PBBFAC,,, | Performed by: OBSTETRICS & GYNECOLOGY

## 2020-06-01 PROCEDURE — 99999 PR PBB SHADOW E&M-EST. PATIENT-LVL III: ICD-10-PCS | Mod: PBBFAC,,, | Performed by: OBSTETRICS & GYNECOLOGY

## 2020-06-01 PROCEDURE — 3008F PR BODY MASS INDEX (BMI) DOCUMENTED: ICD-10-PCS | Mod: CPTII,S$GLB,, | Performed by: OBSTETRICS & GYNECOLOGY

## 2020-06-01 PROCEDURE — 99214 PR OFFICE/OUTPT VISIT, EST, LEVL IV, 30-39 MIN: ICD-10-PCS | Mod: S$GLB,,, | Performed by: OBSTETRICS & GYNECOLOGY

## 2020-06-01 PROCEDURE — 99214 OFFICE O/P EST MOD 30 MIN: CPT | Mod: S$GLB,,, | Performed by: OBSTETRICS & GYNECOLOGY

## 2020-06-01 RX ORDER — ERGOCALCIFEROL 1.25 MG/1
50000 CAPSULE ORAL
COMMUNITY

## 2020-06-01 RX ORDER — LANOLIN ALCOHOL/MO/W.PET/CERES
100 CREAM (GRAM) TOPICAL
COMMUNITY

## 2020-06-01 RX ORDER — TESTOSTERONE 20.25 MG/1.25G
20.25 GEL TOPICAL
COMMUNITY

## 2020-06-01 RX ORDER — LEVOTHYROXINE SODIUM 150 UG/1
TABLET ORAL
COMMUNITY
Start: 2020-05-26

## 2020-06-03 ENCOUNTER — LAB VISIT (OUTPATIENT)
Dept: LAB | Facility: OTHER | Age: 65
End: 2020-06-03
Attending: OBSTETRICS & GYNECOLOGY
Payer: COMMERCIAL

## 2020-06-03 DIAGNOSIS — Z78.0 MENOPAUSE: ICD-10-CM

## 2020-06-03 LAB — PROGEST SERPL-MCNC: 20.1 NG/ML

## 2020-06-03 PROCEDURE — 36415 COLL VENOUS BLD VENIPUNCTURE: CPT

## 2020-06-03 PROCEDURE — 84144 ASSAY OF PROGESTERONE: CPT

## 2020-06-24 DIAGNOSIS — Z12.11 SPECIAL SCREENING FOR MALIGNANT NEOPLASMS, COLON: Primary | ICD-10-CM

## 2020-06-24 RX ORDER — SODIUM, POTASSIUM,MAG SULFATES 17.5-3.13G
1 SOLUTION, RECONSTITUTED, ORAL ORAL DAILY
Qty: 1 KIT | Refills: 0 | Status: SHIPPED | OUTPATIENT
Start: 2020-06-24 | End: 2020-06-26

## 2020-07-20 ENCOUNTER — LAB VISIT (OUTPATIENT)
Dept: SURGERY | Facility: CLINIC | Age: 65
End: 2020-07-20
Payer: COMMERCIAL

## 2020-07-20 LAB — SARS-COV-2 RNA RESP QL NAA+PROBE: NOT DETECTED

## 2020-07-20 PROCEDURE — U0003 INFECTIOUS AGENT DETECTION BY NUCLEIC ACID (DNA OR RNA); SEVERE ACUTE RESPIRATORY SYNDROME CORONAVIRUS 2 (SARS-COV-2) (CORONAVIRUS DISEASE [COVID-19]), AMPLIFIED PROBE TECHNIQUE, MAKING USE OF HIGH THROUGHPUT TECHNOLOGIES AS DESCRIBED BY CMS-2020-01-R: HCPCS

## 2020-07-22 ENCOUNTER — ANESTHESIA EVENT (OUTPATIENT)
Dept: ENDOSCOPY | Facility: HOSPITAL | Age: 65
End: 2020-07-22
Payer: COMMERCIAL

## 2020-07-23 ENCOUNTER — HOSPITAL ENCOUNTER (OUTPATIENT)
Facility: HOSPITAL | Age: 65
Discharge: HOME | End: 2020-07-23
Attending: INTERNAL MEDICINE | Admitting: INTERNAL MEDICINE
Payer: COMMERCIAL

## 2020-07-23 ENCOUNTER — ANESTHESIA (OUTPATIENT)
Dept: ENDOSCOPY | Facility: HOSPITAL | Age: 65
End: 2020-07-23
Payer: COMMERCIAL

## 2020-07-23 VITALS
HEART RATE: 54 BPM | DIASTOLIC BLOOD PRESSURE: 59 MMHG | HEIGHT: 64 IN | WEIGHT: 117 LBS | BODY MASS INDEX: 19.97 KG/M2 | SYSTOLIC BLOOD PRESSURE: 94 MMHG | TEMPERATURE: 98 F | RESPIRATION RATE: 20 BRPM | OXYGEN SATURATION: 100 %

## 2020-07-23 DIAGNOSIS — Z12.11 SCREENING FOR COLON CANCER: ICD-10-CM

## 2020-07-23 PROCEDURE — 25000003 PHARM REV CODE 250: Performed by: INTERNAL MEDICINE

## 2020-07-23 PROCEDURE — 45385 COLONOSCOPY W/LESION REMOVAL: CPT | Performed by: INTERNAL MEDICINE

## 2020-07-23 PROCEDURE — 37000009 HC ANESTHESIA EA ADD 15 MINS: Performed by: INTERNAL MEDICINE

## 2020-07-23 PROCEDURE — 88305 TISSUE EXAM BY PATHOLOGIST: CPT | Mod: 26,,, | Performed by: PATHOLOGY

## 2020-07-23 PROCEDURE — 63600175 PHARM REV CODE 636 W HCPCS: Performed by: NURSE ANESTHETIST, CERTIFIED REGISTERED

## 2020-07-23 PROCEDURE — 45385 COLONOSCOPY W/LESION REMOVAL: CPT | Mod: 33,,, | Performed by: INTERNAL MEDICINE

## 2020-07-23 PROCEDURE — 88305 TISSUE EXAM BY PATHOLOGIST: ICD-10-PCS | Mod: 26,,, | Performed by: PATHOLOGY

## 2020-07-23 PROCEDURE — 45385 PR COLONOSCOPY,REMV LESN,SNARE: ICD-10-PCS | Mod: 33,,, | Performed by: INTERNAL MEDICINE

## 2020-07-23 PROCEDURE — 37000008 HC ANESTHESIA 1ST 15 MINUTES: Performed by: INTERNAL MEDICINE

## 2020-07-23 PROCEDURE — 27201089 HC SNARE, DISP (ANY): Performed by: INTERNAL MEDICINE

## 2020-07-23 PROCEDURE — E9220 PRA ENDO ANESTHESIA: ICD-10-PCS | Mod: 33,,, | Performed by: NURSE ANESTHETIST, CERTIFIED REGISTERED

## 2020-07-23 PROCEDURE — E9220 PRA ENDO ANESTHESIA: HCPCS | Mod: 33,,, | Performed by: NURSE ANESTHETIST, CERTIFIED REGISTERED

## 2020-07-23 PROCEDURE — 88305 TISSUE EXAM BY PATHOLOGIST: CPT | Performed by: PATHOLOGY

## 2020-07-23 RX ORDER — PHENYLEPHRINE HYDROCHLORIDE 10 MG/ML
INJECTION INTRAVENOUS
Status: DISCONTINUED | OUTPATIENT
Start: 2020-07-23 | End: 2020-07-23

## 2020-07-23 RX ORDER — PROPOFOL 10 MG/ML
VIAL (ML) INTRAVENOUS
Status: DISCONTINUED | OUTPATIENT
Start: 2020-07-23 | End: 2020-07-23

## 2020-07-23 RX ORDER — SODIUM CHLORIDE 0.9 % (FLUSH) 0.9 %
10 SYRINGE (ML) INJECTION
Status: CANCELLED | OUTPATIENT
Start: 2020-07-23

## 2020-07-23 RX ORDER — PROPOFOL 10 MG/ML
VIAL (ML) INTRAVENOUS CONTINUOUS PRN
Status: DISCONTINUED | OUTPATIENT
Start: 2020-07-23 | End: 2020-07-23

## 2020-07-23 RX ORDER — LIDOCAINE HYDROCHLORIDE 20 MG/ML
INJECTION INTRAVENOUS
Status: DISCONTINUED | OUTPATIENT
Start: 2020-07-23 | End: 2020-07-23

## 2020-07-23 RX ORDER — SODIUM CHLORIDE 9 MG/ML
INJECTION, SOLUTION INTRAVENOUS CONTINUOUS
Status: DISCONTINUED | OUTPATIENT
Start: 2020-07-23 | End: 2020-07-23 | Stop reason: HOSPADM

## 2020-07-23 RX ADMIN — PHENYLEPHRINE HYDROCHLORIDE 100 MCG: 10 INJECTION INTRAVENOUS at 07:07

## 2020-07-23 RX ADMIN — SODIUM CHLORIDE: 0.9 INJECTION, SOLUTION INTRAVENOUS at 07:07

## 2020-07-23 RX ADMIN — PROPOFOL 60 MG: 10 INJECTION, EMULSION INTRAVENOUS at 07:07

## 2020-07-23 RX ADMIN — PHENYLEPHRINE HYDROCHLORIDE 50 MCG: 10 INJECTION INTRAVENOUS at 07:07

## 2020-07-23 RX ADMIN — LIDOCAINE HYDROCHLORIDE 50 MG: 20 INJECTION, SOLUTION INTRAVENOUS at 07:07

## 2020-07-23 RX ADMIN — PROPOFOL 150 MCG/KG/MIN: 10 INJECTION, EMULSION INTRAVENOUS at 07:07

## 2020-07-23 NOTE — DISCHARGE INSTRUCTIONS
Colonoscopy     A camera attached to a flexible tube with a viewing lens is used to take video pictures.     Colonoscopy is a test to view the inside of your lower digestive tract (colon and rectum). Sometimes it can show the last part of the small intestine (ileum). During the test, small pieces of tissue may be removed for testing. This is called a biopsy. Small growths, such as polyps, may also be removed.   Why is colonoscopy done?  The test is done to help look for colon cancer. And it can help find the source of abdominal pain, bleeding, and changes in bowel habits. It may be needed once a year, depending on factors such as your:  · Age  · Health history  · Family health history  · Symptoms  · Results from any prior colonoscopy  Risks and possible complications  These include:  · Bleeding               · A puncture or tear in the colon   · Risks of anesthesia  · A cancer lesion not being seen  Getting ready   To prepare for the test:  · Talk with your healthcare provider about the risks of the test (see below). Also ask your healthcare provider about alternatives to the test.  · Tell your healthcare provider about any medicines you take. Also tell him or her about any health conditions you may have.  · Make sure your rectum and colon are empty for the test. Follow the diet and bowel prep instructions exactly. If you dont, the test may need to be rescheduled.  · Plan for a friend or family member to drive you home after the test.     Colonoscopy provides an inside view of the entire colon.     You may discuss the results with your doctor right away or at a future visit.  During the test   The test is usually done in the hospital on an outpatient basis. This means you go home the same day. The procedure takes about 30 minutes. During that time:  · You are given relaxing (sedating) medicine through an IV line. You may be drowsy, or fully asleep.  · The healthcare provider will first give you a physical exam to  check for anal and rectal problems.  · Then the anus is lubricated and the scope inserted.  · If you are awake, you may have a feeling similar to needing to have a bowel movement. You may also feel pressure as air is pumped into the colon. Its OK to pass gas during the procedure.  · Biopsy, polyp removal, or other treatments may be done during the test.  After the test   You may have gas right after the test. It can help to try to pass it to help prevent later bloating. Your healthcare provider may discuss the results with you right away. Or you may need to schedule a follow-up visit to talk about the results. After the test, you can go back to your normal eating and other activities. You may be tired from the sedation and need to rest for a few hours.  Date Last Reviewed: 11/1/2016 © 2000-2017 [employer]. [address] [place] [address], [place] [address]. All rights reserved. This information is not intended as a substitute for professional medical care. Always follow your healthcare professional's instructions.

## 2020-07-23 NOTE — ANESTHESIA PREPROCEDURE EVALUATION
07/23/2020  Celeste Mohr is a 64 y.o., female.    Anesthesia Evaluation    I have reviewed the Patient Summary Reports.    I have reviewed the Nursing Notes. I have reviewed the NPO Status.   I have reviewed the Medications.     Review of Systems  Anesthesia Hx:  No problems with previous Anesthesia   Denies Personal Hx of Anesthesia complications.   Cardiovascular:   Denies Hypertension.  Denies MI.  Denies CAD.    Denies Dysrhythmias.     Pulmonary:   Denies COPD.  Denies Asthma.  Denies Shortness of breath.  Denies Sleep Apnea.    Renal/:   Denies Chronic Renal Disease.     Hepatic/GI:   Denies Liver Disease.    Neurological:   Denies TIA. Denies CVA. Denies Seizures.    Endocrine:   Denies Diabetes. Hypothyroidism Denies Hyperthyroidism.        Physical Exam  General:  Well nourished    Airway/Jaw/Neck:  Airway Findings: Mouth Opening: Normal Tongue: Normal  General Airway Assessment: Adult  Mallampati: II  TM Distance: Normal, at least 6 cm     Eyes/Ears/Nose:  EYES/EARS/NOSE FINDINGS: Normal   Dental:  Dental Findings: In tact        Mental Status:  Mental Status Findings: Normal        Anesthesia Plan  Type of Anesthesia, risks & benefits discussed:  Anesthesia Type:  general  Patient's Preference:   Intra-op Monitoring Plan: standard ASA monitors  Intra-op Monitoring Plan Comments:   Post Op Pain Control Plan: multimodal analgesia and IV/PO Opioids PRN  Post Op Pain Control Plan Comments:   Induction:   IV  Beta Blocker:  Patient is not currently on a Beta-Blocker (No further documentation required).       Informed Consent: Patient understands risks and agrees with Anesthesia plan.  Questions answered. Anesthesia consent signed with patient.  ASA Score: 2     Day of Surgery Review of History & Physical:            Ready For Surgery From Anesthesia Perspective.

## 2020-07-23 NOTE — TRANSFER OF CARE
"Anesthesia Transfer of Care Note    Patient: Celeste Mohr    Procedure(s) Performed: Procedure(s) (LRB):  COLONOSCOPY (N/A)    Patient location: GI    Anesthesia Type: general    Transport from OR: Transported from OR on room air with adequate spontaneous ventilation    Post pain: adequate analgesia    Post assessment: tolerated procedure well and no apparent anesthetic complications    Post vital signs: stable    Level of consciousness: responds to stimulation    Nausea/Vomiting: no nausea/vomiting    Complications: none    Transfer of care protocol was followed      Last vitals:   Visit Vitals  BP (!) 85/53 (BP Location: Left arm, Patient Position: Lying)   Pulse 69   Temp 36.6 °C (97.9 °F) (Temporal)   Resp 20   Ht 5' 4" (1.626 m)   Wt 53.1 kg (117 lb)   LMP 01/15/2016   SpO2 99%   Breastfeeding No   BMI 20.08 kg/m²     "

## 2020-07-23 NOTE — ANESTHESIA POSTPROCEDURE EVALUATION
Anesthesia Post Evaluation    Patient: Celeste Mohr    Procedure(s) Performed: Procedure(s) (LRB):  COLONOSCOPY (N/A)    Final Anesthesia Type: general    Patient location during evaluation: PACU  Patient participation: Yes- Able to Participate  Level of consciousness: awake and alert  Post-procedure vital signs: reviewed and stable  Pain management: adequate  Airway patency: patent    PONV status at discharge: No PONV  Anesthetic complications: no      Cardiovascular status: blood pressure returned to baseline  Respiratory status: unassisted and spontaneous ventilation  Hydration status: euvolemic  Follow-up not needed.          Vitals Value Taken Time   BP 94/59 07/23/20 0830   Temp 36.6 °C (97.9 °F) 07/23/20 0800   Pulse 54 07/23/20 0830   Resp 20 07/23/20 0830   SpO2 100 % 07/23/20 0830         Event Time   Out of Recovery 08:31:01         Pain/Jaspal Score: Jaspal Score: 10 (7/23/2020  8:30 AM)

## 2020-07-23 NOTE — H&P
Short Stay Endoscopy History and Physical    PCP - Christopher Jamil MD    Procedure - Colonoscopy  ASA - 2  Mallampati - per anesthesia  History of Anesthesia problems - no  Family history Anesthesia problems -  no     HPI:  This is a 64 y.o. female here for evaluation of :     Average Risk Screening: No  High risk screening: yes - fam hx of CRC  History of polyps: No  Anemia: No  Blood in stools: No  Diarrhea: No  Abdominal Pain: No    Review of Systems:  CONSTITUTIONAL: Denies weight change,  fatigue, fevers, chills, night sweats.  CARDIOVASCULAR: Denies chest pain, shortness of breath, orthopnea and edema.  RESPIRATORY: Denies cough, hemoptysis, dyspnea, and wheezing.  GI: See HPI.    Medical History:  Past Medical History:   Diagnosis Date    Genital herpes     Hormone replacement therapy (HRT)     Hypothyroid     Lichen sclerosus     Menopause 2016    Rheumatoid arthritis     Seasonal allergies        Surgical History:   Past Surgical History:   Procedure Laterality Date    FACIAL COSMETIC SURGERY      REDUCTION OF BOTH BREASTS  1988    VAGINAL DELIVERY  1992, 1995       Family History:   Family History   Problem Relation Age of Onset    Colon cancer Mother 60    Alzheimer's disease Father     Ovarian cancer Sister     No Known Problems Brother     No Known Problems Maternal Aunt     No Known Problems Maternal Uncle     No Known Problems Paternal Aunt     No Known Problems Paternal Uncle     No Known Problems Maternal Grandmother     No Known Problems Maternal Grandfather     No Known Problems Paternal Grandmother     No Known Problems Paternal Grandfather     Blindness Neg Hx     Cataracts Neg Hx     Diabetes Neg Hx     Glaucoma Neg Hx     Amblyopia Neg Hx     Hypertension Neg Hx     Macular degeneration Neg Hx     Retinal detachment Neg Hx     Strabismus Neg Hx     Stroke Neg Hx     Thyroid disease Neg Hx     Breast cancer Neg Hx     Melanoma Neg Hx        Social  History:   Social History     Tobacco Use    Smoking status: Former Smoker    Smokeless tobacco: Never Used    Tobacco comment: in high school /college    Substance Use Topics    Alcohol use: Yes     Comment: Social     Drug use: No       Allergies: Reviewed.    Medications:  No current facility-administered medications on file prior to encounter.      Current Outpatient Medications on File Prior to Encounter   Medication Sig Dispense Refill    AMITIZA 24 mcg Cap TAKE ONE CAPSULE BY MOUTH once OR twice day  0    azelastine (ASTELIN) 137 mcg (0.1 %) nasal spray 2 sprays (274 mcg total) by Nasal route 2 (two) times daily. 30 mL 0    folic acid (FOLVITE) 1 MG tablet Take 1 tablet (1 mg total) by mouth once daily. 90 tablet 4    Lactobacillus rhamnosus GG (CULTURELLE) 10 billion cell capsule Take 2 capsules by mouth once daily.      LORazepam (ATIVAN) 0.5 MG tablet Take 0.5 mg by mouth 2 (two) times daily.  5    sertraline (ZOLOFT) 50 MG tablet Take 50 mg by mouth once daily.  11    valACYclovir (VALTREX) 500 MG tablet At onset of outbreak, take 1 tablet (1,000 mg) by mouth twice daily for 7-10 days. 60 tablet 2    zolpidem (AMBIEN) 10 mg Tab Take 10 mg by mouth nightly.  5    benzonatate (TESSALON) 100 MG capsule       butalbital-aspirin-caffeine -40 mg (FIORINAL) -40 mg Cap Take 1 capsule by mouth every 4 (four) hours as needed.      fluticasone propionate (FLONASE) 50 mcg/actuation nasal spray 2 sprays (100 mcg total) by Each Nare route once daily. 1 Bottle 8    metroNIDAZOLE (METROGEL VAGINAL) 0.75 % vaginal gel One applicatorful vaginally at bedtime x 5 nights; use once weekly thereafter until tube empty. 70 g 0       Physical Exam:  Vital Signs:   Vitals:    07/23/20 0717   BP: 104/64   Pulse: 72   Resp: 16   Temp: 98.2 °F (36.8 °C)     General Appearance: Well appearing in no acute distress  ENT: OP clear  Chest: CTA B  CV: RRR, no m/r/g  Abd: s/nt/nd/nabs  Ext: no  edema    Labs:  Reviewed    Impression: Glen hx of CRC    Plan:  I have explained the risks and benefits of colonoscopy to the patient including but not limited to bleeding, perforation, infection, and death. The patient wishes to proceed with colonoscopy.

## 2020-07-23 NOTE — [employer] PATIENT INSTRUCTIONS
Discharge Summary/Instructions after an Endoscopic Procedure  Patient Name: Celeste Mohr  Patient MRN: [id number]  Patient YOB: 1955 Thursday, July 23, 2020  Edson Irby MD  RESTRICTIONS:  During your procedure today, you received medications for sedation.  These   medications may affect your judgment, balance and coordination.  Therefore,   for 24 hours, you have the following restrictions:   - DO NOT drive a car, operate machinery, make legal/financial decisions,   sign important papers or drink alcohol.    ACTIVITY:  Today: no heavy lifting, straining or running due to procedural   sedation/anesthesia.  The following day: return to full activity including work.  DIET:  Eat and drink normally unless instructed otherwise.     TREATMENT FOR COMMON SIDE EFFECTS:  - Mild abdominal pain, nausea, belching, bloating or excessive gas:  rest,   eat lightly and use a heating pad.  - Sore Throat: treat with throat lozenges and/or gargle with warm salt   water.  - Because air was used during the procedure, expelling large amounts of air   from your rectum or belching is normal.  - If a bowel prep was taken, you may not have a bowel movement for 1-3 days.    This is normal.  SYMPTOMS TO WATCH FOR AND REPORT TO YOUR PHYSICIAN:  1. Abdominal pain or bloating, other than gas cramps.  2. Chest pain.  3. Back pain.  4. Signs of infection such as: chills or fever occurring within 24 hours   after the procedure.  5. Rectal bleeding, which would show as bright red, maroon, or black stools.   (A tablespoon of blood from the rectum is not serious, especially if   hemorrhoids are present.)  6. Vomiting.  7. Weakness or dizziness.  GO DIRECTLY TO THE NEAREST EMERGENCY ROOM IF YOU HAVE ANY OF THE FOLLOWING:      Difficulty breathing              Chills and/or fever over 101 F   Persistent vomiting and/or vomiting blood   Severe abdominal pain   Severe chest pain   Black, tarry stools   Bleeding- more than one  tablespoon   Any other symptom or condition that you feel may need urgent attention  Your doctor recommends these additional instructions:  If any biopsies were taken, your doctors clinic will contact you in 1 to 2   weeks with any results.  - Discharge patient to home.   - Resume previous diet today.   - Continue present medications.   - Await pathology results.   - Repeat colonoscopy in 5 years for surveillance.   - Return to referring physician as previously scheduled.   - Patient has a contact number available for emergencies.  The signs and   symptoms of potential delayed complications were discussed with the   patient.  Return to normal activities tomorrow.  Written discharge   instructions were provided to the patient.  For questions, problems or results please call your physician - Edson Irby MD at Work:  [phone].  [facility], EMERGENCY ROOM PHONE NUMBER: [phone]  IF A COMPLICATION OR EMERGENCY SITUATION ARISES AND YOU ARE UNABLE TO REACH   YOUR PHYSICIAN - GO DIRECTLY TO THE EMERGENCY ROOM.  Edson Irby MD  7/23/2020 7:56:04 AM  This report has been verified and signed electronically.  [employer]

## 2020-07-27 LAB
FINAL PATHOLOGIC DIAGNOSIS: NORMAL
GROSS: NORMAL

## 2020-09-03 ENCOUNTER — TELEPHONE (OUTPATIENT)
Dept: SLEEP MEDICINE | Facility: CLINIC | Age: 65
End: 2020-09-03

## 2020-09-03 RX ORDER — BUTALBITAL, ASPIRIN, AND CAFFEINE 325; 50; 40 MG/1; MG/1; MG/1
1 CAPSULE ORAL EVERY 4 HOURS PRN
Qty: 30 CAPSULE | Refills: 0 | Status: SHIPPED | OUTPATIENT
Start: 2020-09-03 | End: 2020-12-07

## 2020-09-03 NOTE — TELEPHONE ENCOUNTER
----- Message from Estefany Hughes sent at 9/3/2020 11:30 AM CDT -----  Regarding: med refill  Contact: Celeste  Type: RX Refill Request    Who Called: Celeste Connorill or New Rx:Refill    RX Name and Strength:butalbital-aspirin-caffeine -40 mg (FIORINAL) -40 mg Cap    Preferred Pharmacy with phone number:[facility] - [address] [phone] (Phone)  [phone] (Fax)    Local or Mail Order:Local    Ordering Provider:Darian    Would the patient rather a call back or a response via My [employer]? Call    Best Call Back Number:[phone]    Additional Information: Medication refill

## 2020-09-04 ENCOUNTER — OFFICE VISIT (OUTPATIENT)
Dept: SLEEP MEDICINE | Facility: CLINIC | Age: 65
End: 2020-09-04
Payer: COMMERCIAL

## 2020-09-04 DIAGNOSIS — G43.009 MIGRAINE WITHOUT AURA AND WITHOUT STATUS MIGRAINOSUS, NOT INTRACTABLE: Primary | ICD-10-CM

## 2020-09-04 PROCEDURE — 99213 PR OFFICE/OUTPT VISIT, EST, LEVL III, 20-29 MIN: ICD-10-PCS | Mod: 95,,, | Performed by: NURSE PRACTITIONER

## 2020-09-04 PROCEDURE — 99213 OFFICE O/P EST LOW 20 MIN: CPT | Mod: 95,,, | Performed by: NURSE PRACTITIONER

## 2020-09-04 RX ORDER — RIMEGEPANT SULFATE 75 MG/75MG
75 TABLET, ORALLY DISINTEGRATING ORAL ONCE AS NEEDED
Qty: 8 TABLET | Refills: 2 | Status: SHIPPED | OUTPATIENT
Start: 2020-09-04 | End: 2020-09-04

## 2020-09-04 RX ORDER — NORTRIPTYLINE HYDROCHLORIDE 10 MG/1
10-20 CAPSULE ORAL NIGHTLY
Qty: 60 CAPSULE | Refills: 1 | Status: SHIPPED | OUTPATIENT
Start: 2020-09-04 | End: 2020-12-07

## 2020-09-04 NOTE — PROGRESS NOTES
"The patient location is: home  The chief complaint leading to consultation is: headache    Visit type: TELE AUDIOVISUAL:96257    Face to Face time with patient: 15 minutes of total time spent on the encounter, which includes face to face time and non-face to face time preparing to see the patient (eg, review of tests), Obtaining and/or reviewing separately obtained history, Documenting clinical information in the electronic or other health record, Independently interpreting results (not separately reported) and communicating results to the patient/family/caregiver, or Care coordination (not separately reported).   Each patient to whom he or she provides medical services by telemedicine is:  (1) informed of the relationship between the physician and patient and the respective role of any other health care provider with respect to management of the patient; and (2) notified that he or she may decline to receive medical services by telemedicine and may withdraw from such care at any time.    Notes: Since seen she believes she began nortriptyline 10mg "for a moment" but then stopped. R sided headaches have been resolved since spring 2019 up until ~ 7d ago returned. Waking with them. She is moving out of her house to her mom's until she can move to [place]. Has +nausea and photo/phonophobia. Was in bed all day recently with pain. Fiorinal ineffective. Pain today behind her eyes/bridge of nose and back of head, otherwise typically R sided.     MRI/MRA normal 2019    HISTORY 4/2019 VB  REASON FOR CONSULT: Headaches   63/F without hx of migraines.  Began having vision blurred 2/2019 and saw optometrist and no change in  Vision, was normal exam. She stopped her methotrexate at that time but head pain has persisted. After initial vision change, she began with R sided headache and had 2 transient episodes of vision loss OD. Continues to have daily R sided head pain, dull or pressure, lasting hours, often waking her up from sleep " "same time. She takes 2 excedrin or fioricet and may return to sleep and awakens HA free. She does not feel right and has no energy/feels depleted. Entire R face and behind ear (scar plastic surgery felt same way after surgery but it got better over time) and lateral neck feels sore. HA describes as retro-orbital R and behind ear typically, but few times has awakened with band feeling around head. Denies associated nausea, vomiting, blurred vision, neck tightness, or photo/phonophobia. But does feel nauseated throughout day and has to force herself to eat. She does not drink ETOH. Denies recent head injury, recent major life stressor or fall or fever or infection.  Denies unilateral conjunctival injection or tearing or eye droop or nasal drainage. Denies pain is shocklike or electric sensation.     ESR/CRP normal  CMP and CBC normal  SH: , art gallery        ROS: Denies double vision. Leg weakness. Tightness R traps "stress", sleeping undisturbed (weaning off ambien past 30d),  Otherwise a balance review of 10-systems is negative.         PHYSICAL EXAM:   General: W/D, W/N, well groomed  LMP 01/15/2016 Comment:    2016  Neurological: Awake, alert, oriented x 3. Speech fluent, no dysarthria. Good attention span. Good fund of knowledge.       IMPRESSION:   Migraine without aura  RA    PLAN   Stop fiorinal  nurtec odt 75mg qd prn (notify of status, if not helping trial ubrelvy or 50mg imitrex +- nsaid or 2 ES tylenol  Begin to track headaches/patterns  Massage recommendedContinue fioricet or excedrin. Discussed overuse medication headaches  rtc 6-wks re-eval  "

## 2020-09-15 ENCOUNTER — LAB VISIT (OUTPATIENT)
Dept: LAB | Facility: OTHER | Age: 65
End: 2020-09-15
Attending: INTERNAL MEDICINE
Payer: COMMERCIAL

## 2020-09-15 DIAGNOSIS — M19.90 INFLAMMATORY ARTHRITIS: ICD-10-CM

## 2020-09-15 DIAGNOSIS — M19.90 INFLAMMATORY ARTHRITIS: Primary | ICD-10-CM

## 2020-09-15 LAB
ALBUMIN SERPL BCP-MCNC: 3.9 G/DL (ref 3.5–5.2)
ALP SERPL-CCNC: 67 U/L (ref 55–135)
ALT SERPL W/O P-5'-P-CCNC: 16 U/L (ref 10–44)
ANION GAP SERPL CALC-SCNC: 10 MMOL/L (ref 8–16)
AST SERPL-CCNC: 18 U/L (ref 10–40)
BASOPHILS # BLD AUTO: 0.03 K/UL (ref 0–0.2)
BASOPHILS NFR BLD: 0.6 % (ref 0–1.9)
BILIRUB SERPL-MCNC: 0.3 MG/DL (ref 0.1–1)
BUN SERPL-MCNC: 16 MG/DL (ref 8–23)
CALCIUM SERPL-MCNC: 8.9 MG/DL (ref 8.7–10.5)
CHLORIDE SERPL-SCNC: 108 MMOL/L (ref 95–110)
CO2 SERPL-SCNC: 24 MMOL/L (ref 23–29)
CREAT SERPL-MCNC: 0.8 MG/DL (ref 0.5–1.4)
CRP SERPL-MCNC: 0.8 MG/L (ref 0–8.2)
DIFFERENTIAL METHOD: ABNORMAL
EOSINOPHIL # BLD AUTO: 0.1 K/UL (ref 0–0.5)
EOSINOPHIL NFR BLD: 2.2 % (ref 0–8)
ERYTHROCYTE [DISTWIDTH] IN BLOOD BY AUTOMATED COUNT: 12.5 % (ref 11.5–14.5)
ERYTHROCYTE [SEDIMENTATION RATE] IN BLOOD: 3 MM/HR (ref 0–20)
ERYTHROCYTE [SEDIMENTATION RATE] IN BLOOD: 3 MM/HR (ref 0–20)
EST. GFR  (AFRICAN AMERICAN): >60 ML/MIN/1.73 M^2
EST. GFR  (NON AFRICAN AMERICAN): >60 ML/MIN/1.73 M^2
GLUCOSE SERPL-MCNC: 61 MG/DL (ref 70–110)
HCT VFR BLD AUTO: 38.4 % (ref 37–48.5)
HGB BLD-MCNC: 12.2 G/DL (ref 12–16)
IMM GRANULOCYTES # BLD AUTO: 0.01 K/UL (ref 0–0.04)
IMM GRANULOCYTES NFR BLD AUTO: 0.2 % (ref 0–0.5)
LYMPHOCYTES # BLD AUTO: 1.2 K/UL (ref 1–4.8)
LYMPHOCYTES NFR BLD: 23.9 % (ref 18–48)
MCH RBC QN AUTO: 30.5 PG (ref 27–31)
MCHC RBC AUTO-ENTMCNC: 31.8 G/DL (ref 32–36)
MCV RBC AUTO: 96 FL (ref 82–98)
MONOCYTES # BLD AUTO: 0.4 K/UL (ref 0.3–1)
MONOCYTES NFR BLD: 7.2 % (ref 4–15)
NEUTROPHILS # BLD AUTO: 3.3 K/UL (ref 1.8–7.7)
NEUTROPHILS NFR BLD: 65.9 % (ref 38–73)
NRBC BLD-RTO: 0 /100 WBC
PLATELET # BLD AUTO: 229 K/UL (ref 150–350)
PMV BLD AUTO: 10.8 FL (ref 9.2–12.9)
POTASSIUM SERPL-SCNC: 3.6 MMOL/L (ref 3.5–5.1)
PROT SERPL-MCNC: 6.3 G/DL (ref 6–8.4)
RBC # BLD AUTO: 4 M/UL (ref 4–5.4)
SODIUM SERPL-SCNC: 142 MMOL/L (ref 136–145)
WBC # BLD AUTO: 4.97 K/UL (ref 3.9–12.7)

## 2020-09-15 PROCEDURE — 86140 C-REACTIVE PROTEIN: CPT

## 2020-09-15 PROCEDURE — 86704 HEP B CORE ANTIBODY TOTAL: CPT

## 2020-09-15 PROCEDURE — 87340 HEPATITIS B SURFACE AG IA: CPT

## 2020-09-15 PROCEDURE — 85651 RBC SED RATE NONAUTOMATED: CPT

## 2020-09-15 PROCEDURE — 80053 COMPREHEN METABOLIC PANEL: CPT

## 2020-09-15 PROCEDURE — 36415 COLL VENOUS BLD VENIPUNCTURE: CPT

## 2020-09-15 PROCEDURE — 85025 COMPLETE CBC W/AUTO DIFF WBC: CPT

## 2020-09-16 LAB
HBV CORE AB SERPL QL IA: NEGATIVE
HBV SURFACE AG SERPL QL IA: NEGATIVE

## 2020-09-21 ENCOUNTER — TELEPHONE (OUTPATIENT)
Dept: OBSTETRICS AND GYNECOLOGY | Facility: CLINIC | Age: 65
End: 2020-09-21

## 2020-09-22 ENCOUNTER — PATIENT MESSAGE (OUTPATIENT)
Dept: RHEUMATOLOGY | Facility: CLINIC | Age: 65
End: 2020-09-22

## 2020-09-23 NOTE — TELEPHONE ENCOUNTER
Benita JASON Staff 2 days ago     Dr Ordaz pt calling, pt had a hormone follow up on 10-5-20 but will be out of town. Pt is scheduled now for 11-2-20 pt wants to make sure this is okay and does she have to do any labs. Pt # [phone]    Routing comment       Celeste Mohr Pollard [phone]  Benita Raman 2 days ago     Pt had to r/s her HRT appt to 11/2 do you need her to do any additional labs before her appt?

## 2020-09-24 ENCOUNTER — TELEPHONE (OUTPATIENT)
Dept: OBSTETRICS AND GYNECOLOGY | Facility: CLINIC | Age: 65
End: 2020-09-24

## 2020-09-24 DIAGNOSIS — Z78.0 MENOPAUSE: Primary | ICD-10-CM

## 2020-09-24 NOTE — TELEPHONE ENCOUNTER
MD Kira Mcdaniel MA   Caller: Unspecified (3 days ago,  3:35 PM)             She needs to have labs drawn after 4 PM if she places testosterone on at night.      Spoke with pt and scheduled lab appt. She applies cream at night so appt scheduled at 4

## 2020-09-25 ENCOUNTER — LAB VISIT (OUTPATIENT)
Dept: LAB | Facility: OTHER | Age: 65
End: 2020-09-25
Attending: OBSTETRICS & GYNECOLOGY
Payer: COMMERCIAL

## 2020-09-25 DIAGNOSIS — Z78.0 MENOPAUSE: ICD-10-CM

## 2020-09-25 PROCEDURE — 84403 ASSAY OF TOTAL TESTOSTERONE: CPT

## 2020-09-25 PROCEDURE — 84402 ASSAY OF FREE TESTOSTERONE: CPT

## 2020-09-25 PROCEDURE — 36415 COLL VENOUS BLD VENIPUNCTURE: CPT

## 2020-09-26 LAB — TESTOST SERPL-MCNC: 44 NG/DL (ref 5–73)

## 2020-09-28 LAB — TESTOST FREE SERPL-MCNC: 0.5 PG/ML

## 2020-12-01 ENCOUNTER — HOSPITAL ENCOUNTER (OUTPATIENT)
Dept: RADIOLOGY | Facility: HOSPITAL | Age: 65
Discharge: HOME | End: 2020-12-01
Attending: OBSTETRICS & GYNECOLOGY
Payer: COMMERCIAL

## 2020-12-01 VITALS — HEIGHT: 64 IN | WEIGHT: 116.88 LBS | BODY MASS INDEX: 19.96 KG/M2

## 2020-12-01 PROCEDURE — 77063 MAMMO DIGITAL SCREENING BILAT WITH TOMOSYNTHESIS_CAD: ICD-10-PCS | Mod: 26,,, | Performed by: RADIOLOGY

## 2020-12-01 PROCEDURE — 77067 SCR MAMMO BI INCL CAD: CPT | Mod: TC

## 2020-12-01 PROCEDURE — 77067 MAMMO DIGITAL SCREENING BILAT WITH TOMOSYNTHESIS_CAD: ICD-10-PCS | Mod: 26,,, | Performed by: RADIOLOGY

## 2020-12-01 PROCEDURE — 77063 BREAST TOMOSYNTHESIS BI: CPT | Mod: 26,,, | Performed by: RADIOLOGY

## 2020-12-01 PROCEDURE — 77067 SCR MAMMO BI INCL CAD: CPT | Mod: 26,,, | Performed by: RADIOLOGY

## 2020-12-02 ENCOUNTER — LAB VISIT (OUTPATIENT)
Dept: LAB | Facility: OTHER | Age: 65
End: 2020-12-02
Attending: INTERNAL MEDICINE
Payer: COMMERCIAL

## 2020-12-02 DIAGNOSIS — M19.90 INFLAMMATORY ARTHRITIS: ICD-10-CM

## 2020-12-02 LAB
ALBUMIN SERPL BCP-MCNC: 4.1 G/DL (ref 3.5–5.2)
ALP SERPL-CCNC: 59 U/L (ref 55–135)
ALT SERPL W/O P-5'-P-CCNC: 45 U/L (ref 10–44)
ANION GAP SERPL CALC-SCNC: 9 MMOL/L (ref 8–16)
AST SERPL-CCNC: 46 U/L (ref 10–40)
BASOPHILS # BLD AUTO: 0.04 K/UL (ref 0–0.2)
BASOPHILS NFR BLD: 0.6 % (ref 0–1.9)
BILIRUB SERPL-MCNC: 0.5 MG/DL (ref 0.1–1)
BUN SERPL-MCNC: 20 MG/DL (ref 8–23)
CALCIUM SERPL-MCNC: 8.7 MG/DL (ref 8.7–10.5)
CHLORIDE SERPL-SCNC: 106 MMOL/L (ref 95–110)
CO2 SERPL-SCNC: 24 MMOL/L (ref 23–29)
CREAT SERPL-MCNC: 0.8 MG/DL (ref 0.5–1.4)
CRP SERPL-MCNC: 1.8 MG/L (ref 0–8.2)
DIFFERENTIAL METHOD: NORMAL
EOSINOPHIL # BLD AUTO: 0.1 K/UL (ref 0–0.5)
EOSINOPHIL NFR BLD: 1.3 % (ref 0–8)
ERYTHROCYTE [DISTWIDTH] IN BLOOD BY AUTOMATED COUNT: 12 % (ref 11.5–14.5)
EST. GFR  (AFRICAN AMERICAN): >60 ML/MIN/1.73 M^2
EST. GFR  (NON AFRICAN AMERICAN): >60 ML/MIN/1.73 M^2
GLUCOSE SERPL-MCNC: 88 MG/DL (ref 70–110)
HCT VFR BLD AUTO: 39.6 % (ref 37–48.5)
HGB BLD-MCNC: 12.7 G/DL (ref 12–16)
IMM GRANULOCYTES # BLD AUTO: 0.03 K/UL (ref 0–0.04)
IMM GRANULOCYTES NFR BLD AUTO: 0.4 % (ref 0–0.5)
LYMPHOCYTES # BLD AUTO: 1.6 K/UL (ref 1–4.8)
LYMPHOCYTES NFR BLD: 23.9 % (ref 18–48)
MCH RBC QN AUTO: 30.8 PG (ref 27–31)
MCHC RBC AUTO-ENTMCNC: 32.1 G/DL (ref 32–36)
MCV RBC AUTO: 96 FL (ref 82–98)
MONOCYTES # BLD AUTO: 0.5 K/UL (ref 0.3–1)
MONOCYTES NFR BLD: 7.1 % (ref 4–15)
NEUTROPHILS # BLD AUTO: 4.5 K/UL (ref 1.8–7.7)
NEUTROPHILS NFR BLD: 66.7 % (ref 38–73)
NRBC BLD-RTO: 0 /100 WBC
PLATELET # BLD AUTO: 264 K/UL (ref 150–350)
PMV BLD AUTO: 10.3 FL (ref 9.2–12.9)
POTASSIUM SERPL-SCNC: 4.6 MMOL/L (ref 3.5–5.1)
PROT SERPL-MCNC: 6.5 G/DL (ref 6–8.4)
RBC # BLD AUTO: 4.12 M/UL (ref 4–5.4)
SODIUM SERPL-SCNC: 139 MMOL/L (ref 136–145)
WBC # BLD AUTO: 6.73 K/UL (ref 3.9–12.7)

## 2020-12-02 PROCEDURE — 85025 COMPLETE CBC W/AUTO DIFF WBC: CPT

## 2020-12-02 PROCEDURE — 86140 C-REACTIVE PROTEIN: CPT

## 2020-12-02 PROCEDURE — 80053 COMPREHEN METABOLIC PANEL: CPT

## 2020-12-02 PROCEDURE — 36415 COLL VENOUS BLD VENIPUNCTURE: CPT

## 2020-12-07 ENCOUNTER — OFFICE VISIT (OUTPATIENT)
Dept: OBSTETRICS AND GYNECOLOGY | Facility: CLINIC | Age: 65
End: 2020-12-07
Attending: OBSTETRICS & GYNECOLOGY
Payer: COMMERCIAL

## 2020-12-07 VITALS
DIASTOLIC BLOOD PRESSURE: 68 MMHG | SYSTOLIC BLOOD PRESSURE: 90 MMHG | BODY MASS INDEX: 19.96 KG/M2 | HEIGHT: 64 IN | WEIGHT: 116.88 LBS

## 2020-12-07 DIAGNOSIS — N89.8 VAGINAL IRRITATION: ICD-10-CM

## 2020-12-07 DIAGNOSIS — Z78.0 MENOPAUSE: ICD-10-CM

## 2020-12-07 DIAGNOSIS — Z01.419 ENCOUNTER FOR GYNECOLOGICAL EXAMINATION: Primary | ICD-10-CM

## 2020-12-07 PROCEDURE — 99396 PR PREVENTIVE VISIT,EST,40-64: ICD-10-PCS | Mod: S$GLB,,, | Performed by: OBSTETRICS & GYNECOLOGY

## 2020-12-07 PROCEDURE — 1126F AMNT PAIN NOTED NONE PRSNT: CPT | Mod: S$GLB,,, | Performed by: OBSTETRICS & GYNECOLOGY

## 2020-12-07 PROCEDURE — 99999 PR PBB SHADOW E&M-EST. PATIENT-LVL III: ICD-10-PCS | Mod: PBBFAC,,, | Performed by: OBSTETRICS & GYNECOLOGY

## 2020-12-07 PROCEDURE — 3008F BODY MASS INDEX DOCD: CPT | Mod: CPTII,S$GLB,, | Performed by: OBSTETRICS & GYNECOLOGY

## 2020-12-07 PROCEDURE — 99396 PREV VISIT EST AGE 40-64: CPT | Mod: S$GLB,,, | Performed by: OBSTETRICS & GYNECOLOGY

## 2020-12-07 PROCEDURE — 1126F PR PAIN SEVERITY QUANTIFIED, NO PAIN PRESENT: ICD-10-PCS | Mod: S$GLB,,, | Performed by: OBSTETRICS & GYNECOLOGY

## 2020-12-07 PROCEDURE — 87510 GARDNER VAG DNA DIR PROBE: CPT

## 2020-12-07 PROCEDURE — 87480 CANDIDA DNA DIR PROBE: CPT

## 2020-12-07 PROCEDURE — 3008F PR BODY MASS INDEX (BMI) DOCUMENTED: ICD-10-PCS | Mod: CPTII,S$GLB,, | Performed by: OBSTETRICS & GYNECOLOGY

## 2020-12-07 PROCEDURE — 99999 PR PBB SHADOW E&M-EST. PATIENT-LVL III: CPT | Mod: PBBFAC,,, | Performed by: OBSTETRICS & GYNECOLOGY

## 2020-12-07 RX ORDER — INFLUENZA A VIRUS A/NEBRASKA/14/2019 (H1N1) ANTIGEN (MDCK CELL DERIVED, PROPIOLACTONE INACTIVATED), INFLUENZA A VIRUS A/DELAWARE/39/2019 (H3N2) ANTIGEN (MDCK CELL DERIVED, PROPIOLACTONE INACTIVATED), INFLUENZA B VIRUS B/SINGAPORE/INFTT-16-0610/2016 ANTIGEN (MDCK CELL DERIVED, PROPIOLACTONE INACTIVATED), INFLUENZA B VIRUS B/DARWIN/7/2019 ANTIGEN (MDCK CELL DERIVED, PROPIOLACTONE INACTIVATED) 15; 15; 15; 15 UG/.5ML; UG/.5ML; UG/.5ML; UG/.5ML
INJECTION, SUSPENSION INTRAMUSCULAR
COMMUNITY
Start: 2020-10-22

## 2020-12-07 RX ORDER — PROGESTERONE 200 MG/1
CAPSULE ORAL
Qty: 90 CAPSULE | Refills: 3 | Status: SHIPPED | OUTPATIENT
Start: 2020-12-07 | End: 2021-03-19 | Stop reason: SDUPTHER

## 2020-12-07 RX ORDER — ESTRADIOL 2 MG/1
2 TABLET ORAL DAILY
Qty: 90 TABLET | Refills: 3 | Status: SHIPPED | OUTPATIENT
Start: 2020-12-07 | End: 2021-07-26 | Stop reason: SDUPTHER

## 2020-12-07 NOTE — PROGRESS NOTES
Subjective:       Patient ID: Celeste Mohr is a 65 y.o. female.    Chief Complaint:  Well Woman (Annual  --  pap/hpv 19  --  mmg 20, birads ( [facility])  --  dexa 18, normal  --  colon 20, polyp ( 5yrs) )      History of Present Illness.  Celeste Mohr is a 65 y.o. female.  She has no breast or urinary symptoms.  She has no postcoital bleeding, pelvic pain or vaginal discharge.  She complains of vaginal irritation and a dischargefor a week.    Current HRT Regimen:  Estradiol 2 mg  Progesterone 200 mg  Testosterone cream 2% Lipoderm (felt like sex drive was better on Versabase)    GYN & OB History  Patient's last menstrual period was 01/15/2016.   Pap: 2020 Normal HPV negative  Mammogram: 2020 [facility]  Colonoscopy: 2020 polyps  DEXA: 18 Normal  Routine Labs:   LFTs mildly elevated    OB History    Para Term  AB Living   3 2 2   1 2   SAB TAB Ectopic Multiple Live Births     1     2      # Outcome Date GA Lbr Antonio/2nd Weight Sex Delivery Anes PTL Lv   3 Term  40w0d  3.629 kg (8 lb) M Vag-Spont   MAYLIN   2 Term  40w0d  3.175 kg (7 lb) F Vag-Spont   MAYLIN   1 TAB      TAB         Obstetric Comments   Menopause ~ 60   Menarche ~ 18       Past Medical History:   Diagnosis Date    Genital herpes     Hormone replacement therapy (HRT)     Hypothyroid     Lichen sclerosus     Menopause 2016    Rheumatoid arthritis     Seasonal allergies      Past Surgical History:   Procedure Laterality Date    AUGMENTATION OF BREAST Bilateral     BREAST BIOPSY Bilateral     exc and core biopsies, benign    COLONOSCOPY N/A 2020    Procedure: COLONOSCOPY;  Surgeon: Edson Irby MD;  Location: [facility] ([address] [place] [address]);  Service: Endoscopy;  Laterality: N/A;  ambulatory referral for screening colonoscopy from   -LVM to schedule covid-order in-MS  -lvm to schedule covid-BB  [place] [place]  instructions emailed to  patient-BB    FACIAL COSMETIC SURGERY      REDUCTION OF BOTH BREASTS  1988    TOTAL REDUCTION MAMMOPLASTY      VAGINAL DELIVERY  1992, 1995     Family History   Problem Relation Age of Onset    Colon cancer Mother 60    Alzheimer's disease Father     Ovarian cancer Sister     No Known Problems Brother     No Known Problems Maternal Aunt     No Known Problems Maternal Uncle     No Known Problems Paternal Aunt     No Known Problems Paternal Uncle     No Known Problems Maternal Grandmother     No Known Problems Maternal Grandfather     No Known Problems Paternal Grandmother     No Known Problems Paternal Grandfather     Blindness Neg Hx     Cataracts Neg Hx     Diabetes Neg Hx     Glaucoma Neg Hx     Amblyopia Neg Hx     Hypertension Neg Hx     Macular degeneration Neg Hx     Retinal detachment Neg Hx     Strabismus Neg Hx     Stroke Neg Hx     Thyroid disease Neg Hx     Breast cancer Neg Hx     Melanoma Neg Hx      Social History     Tobacco Use    Smoking status: Former Smoker    Smokeless tobacco: Never Used    Tobacco comment: in high school /college    Substance Use Topics    Alcohol use: Yes     Comment: Social     Drug use: No       Current Outpatient Medications:     azelastine (ASTELIN) 137 mcg (0.1 %) nasal spray, 2 sprays (274 mcg total) by Nasal route 2 (two) times daily., Disp: 30 mL, Rfl: 0    benzonatate (TESSALON) 100 MG capsule, , Disp: , Rfl:     cyanocobalamin (VITAMIN B-12) 1000 MCG tablet, Take 100 mcg by mouth., Disp: , Rfl:     ergocalciferol (ERGOCALCIFEROL) 50,000 unit Cap, Take 50,000 Units by mouth., Disp: , Rfl:     estradioL (ESTRACE) 2 MG tablet, Take 1 tablet (2 mg total) by mouth once daily., Disp: 90 tablet, Rfl: 3    FLUCELVAX QUAD [id number], PF, 60 mcg (15 mcg x 4)/0.5 mL Syrg, , Disp: , Rfl:     fluticasone propionate (FLONASE) 50 mcg/actuation nasal spray, 2 sprays (100 mcg total) by Each Nare route once daily., Disp: 1 Bottle, Rfl: 8     folic acid (FOLVITE) 1 MG tablet, Take 1 tablet (1 mg total) by mouth once daily., Disp: 90 tablet, Rfl: 4    Lactobacillus rhamnosus GG (CULTURELLE) 10 billion cell capsule, Take 2 capsules by mouth once daily., Disp: , Rfl:     levothyroxine (SYNTHROID) 150 MCG tablet, , Disp: , Rfl:     LORazepam (ATIVAN) 0.5 MG tablet, Take 0.5 mg by mouth 2 (two) times daily., Disp: , Rfl: 5    methotrexate 2.5 MG Tab, Take 8 tablets (20 mg total) by mouth every 7 days., Disp: 96 tablet, Rfl: 0    metroNIDAZOLE (METROGEL VAGINAL) 0.75 % vaginal gel, One applicatorful vaginally at bedtime x 5 nights; use once weekly thereafter until tube empty., Disp: 70 g, Rfl: 0    progesterone (PROMETRIUM) 200 MG capsule, Take 1 capsule by mouth 30-60 minutes before bed every night, Disp: 90 capsule, Rfl: 3    sertraline (ZOLOFT) 50 MG tablet, Take 50 mg by mouth once daily., Disp: , Rfl: 11    testosterone (ANDROGEL) 20.25 mg/1.25 gram (1.62 %) GlPm, Place 20.25 mg onto the skin., Disp: , Rfl:     valACYclovir (VALTREX) 500 MG tablet, At onset of outbreak, take 1 tablet (1,000 mg) by mouth twice daily for 7-10 days., Disp: 60 tablet, Rfl: 2    zolpidem (AMBIEN) 10 mg Tab, Take 10 mg by mouth nightly., Disp: , Rfl: 5    Review of patient's allergies indicates:  No Known Allergies    Review of Systems  Review of Systems   Constitutional: Negative for fatigue.   HENT: Negative for trouble swallowing.    Eyes: Negative for visual disturbance.   Respiratory: Negative for cough and shortness of breath.    Cardiovascular: Negative for chest pain.   Gastrointestinal: Negative for abdominal distention, abdominal pain, blood in stool, nausea and vomiting.   Genitourinary: Negative for difficulty urinating, dyspareunia, dysuria, flank pain, frequency, hematuria, pelvic pain, urgency, vaginal bleeding, vaginal discharge and vaginal pain.   Musculoskeletal: Negative for arthralgias.   Skin: Negative for rash.   Neurological: Negative for  "dizziness and headaches.   Psychiatric/Behavioral: Negative for sleep disturbance. The patient is not nervous/anxious.         Objective:     Vitals:    12/07/20 1144   BP: 90/68   Weight: 53 kg (116 lb 13.5 oz)   Height: 5' 4" (1.626 m)   PainSc: 0-No pain     Body mass index is 20.06 kg/m².    Physical Exam:   Constitutional: She is oriented to person, place, and time. She appears well-developed and well-nourished.    HENT:   Head: Normocephalic.     Neck: Normal range of motion. No thyromegaly present.     Pulmonary/Chest: Right breast exhibits no mass, no nipple discharge, no skin change, no tenderness and no swelling. Left breast exhibits no mass, no nipple discharge, no skin change, no tenderness and no swelling. Breasts are symmetrical.        Abdominal: Soft. Normal appearance and bowel sounds are normal. She exhibits no distension. There is no abdominal tenderness.     Genitourinary:    Vagina and uterus normal.      Pelvic exam was performed with patient supine.   There is no rash, tenderness, lesion or injury on the right labia. There is no rash, tenderness, lesion or injury on the left labia. Cervix is normal. Right adnexum displays no mass, no tenderness and no fullness. Left adnexum displays no mass, no tenderness and no fullness. No erythema in the vagina. Cervix exhibits no motion tenderness and no discharge. negative for vaginal discharge          Musculoskeletal: Normal range of motion.      Lymphadenopathy:        Right: No supraclavicular adenopathy present.        Left: No supraclavicular adenopathy present.    Neurological: She is alert and oriented to person, place, and time.    Skin: Skin is warm and dry.    Psychiatric: She has a normal mood and affect.        Assessment/ Plan:     Encounter for gynecological examination    Menopause  -     progesterone (PROMETRIUM) 200 MG capsule; Take 1 capsule by mouth 30-60 minutes before bed every night  Dispense: 90 capsule; Refill: 3  -     estradioL " (ESTRACE) 2 MG tablet; Take 1 tablet (2 mg total) by mouth once daily.  Dispense: 90 tablet; Refill: 3    Continue:  Estradiol 2 mg  Progesterone 200 mg  Increase:  Testosterone cream 3% Lipoderm  AFFIRM sent  Routine pap smears.  Self breast exam and mammography discussed  Routine colonoscopy discussed.  Diet and exercise discussed.  Routine bone mineral density testing.  Yearly influenza vaccination discussed.  Follow-up with me in 3 months

## 2020-12-08 DIAGNOSIS — M25.50 POLYARTHRALGIA: Primary | ICD-10-CM

## 2020-12-09 ENCOUNTER — TELEPHONE (OUTPATIENT)
Dept: OBSTETRICS AND GYNECOLOGY | Facility: CLINIC | Age: 65
End: 2020-12-09

## 2020-12-09 DIAGNOSIS — N76.0 BV (BACTERIAL VAGINOSIS): Primary | ICD-10-CM

## 2020-12-09 DIAGNOSIS — B96.89 BV (BACTERIAL VAGINOSIS): Primary | ICD-10-CM

## 2020-12-09 LAB
CANDIDA RRNA VAG QL PROBE: NEGATIVE
G VAGINALIS RRNA GENITAL QL PROBE: POSITIVE
T VAGINALIS RRNA GENITAL QL PROBE: NEGATIVE

## 2020-12-09 RX ORDER — METRONIDAZOLE 500 MG/1
500 TABLET ORAL 2 TIMES DAILY
Qty: 14 TABLET | Refills: 0 | Status: SHIPPED | OUTPATIENT
Start: 2020-12-09 | End: 2020-12-16

## 2020-12-17 ENCOUNTER — OFFICE VISIT (OUTPATIENT)
Dept: RHEUMATOLOGY | Facility: CLINIC | Age: 65
End: 2020-12-17
Payer: COMMERCIAL

## 2020-12-17 VITALS
DIASTOLIC BLOOD PRESSURE: 70 MMHG | HEART RATE: 66 BPM | WEIGHT: 127 LBS | SYSTOLIC BLOOD PRESSURE: 105 MMHG | HEIGHT: 64 IN | BODY MASS INDEX: 21.68 KG/M2

## 2020-12-17 DIAGNOSIS — R74.01 TRANSAMINITIS: ICD-10-CM

## 2020-12-17 DIAGNOSIS — Z79.631 ENCOUNTER FOR METHOTREXATE MONITORING: ICD-10-CM

## 2020-12-17 DIAGNOSIS — Z51.81 ENCOUNTER FOR METHOTREXATE MONITORING: ICD-10-CM

## 2020-12-17 DIAGNOSIS — M06.9 RHEUMATOID ARTHRITIS INVOLVING MULTIPLE JOINTS: Primary | ICD-10-CM

## 2020-12-17 PROCEDURE — 99214 OFFICE O/P EST MOD 30 MIN: CPT | Mod: S$GLB,,, | Performed by: INTERNAL MEDICINE

## 2020-12-17 PROCEDURE — 99214 PR OFFICE/OUTPT VISIT, EST, LEVL IV, 30-39 MIN: ICD-10-PCS | Mod: S$GLB,,, | Performed by: INTERNAL MEDICINE

## 2020-12-17 PROCEDURE — 99999 PR PBB SHADOW E&M-EST. PATIENT-LVL III: CPT | Mod: PBBFAC,,, | Performed by: INTERNAL MEDICINE

## 2020-12-17 PROCEDURE — 3008F BODY MASS INDEX DOCD: CPT | Mod: CPTII,S$GLB,, | Performed by: INTERNAL MEDICINE

## 2020-12-17 PROCEDURE — 3008F PR BODY MASS INDEX (BMI) DOCUMENTED: ICD-10-PCS | Mod: CPTII,S$GLB,, | Performed by: INTERNAL MEDICINE

## 2020-12-17 PROCEDURE — 1126F PR PAIN SEVERITY QUANTIFIED, NO PAIN PRESENT: ICD-10-PCS | Mod: S$GLB,,, | Performed by: INTERNAL MEDICINE

## 2020-12-17 PROCEDURE — 99999 PR PBB SHADOW E&M-EST. PATIENT-LVL III: ICD-10-PCS | Mod: PBBFAC,,, | Performed by: INTERNAL MEDICINE

## 2020-12-17 PROCEDURE — 1126F AMNT PAIN NOTED NONE PRSNT: CPT | Mod: S$GLB,,, | Performed by: INTERNAL MEDICINE

## 2020-12-17 NOTE — PROGRESS NOTES
Progress Notes        Subjective:       Patient ID: Celeste Mohr is a 62 y.o. female.     Chief Complaint: Disease Management     HPI      62 year old female with PMH of hypothyroidism, pericardial effusion here for evaluation. She was diagnosed with pericardial effusion January 19th.  Reports that the effusion was incidentally found on the chest xray. She reports she has pain in elbows,hands, and feet. Reports that she has been having joint pain for a couple of months. Pain level can be as high as 8/10, aching, non-radiating. Reports that her hands get swollen.  Denies fevers, hair loss, or photosensitivity. Denies raynauds, blood clot or miscarriage. Reports that she has stiffness in hands, ankles,elbows and knees.  Reports she has stiffness for 1.5 to 2 hours.   She started medrol pack with improvement in pain. Reports meloxicam did not help. Reports she has pain with opening jars.Quit smoking in 30s to 40s.  Denies chest pain or SOB.         Interval history:She is on MTX 8 pills once a week.   She takes folic acid every day. Denies any pain or inflammation.            Family hx: negative for RA  Review of Systems   Constitutional: Positive for fatigue. Negative for activity change, appetite change, chills and diaphoresis.   HENT: Negative for congestion, ear discharge, ear pain, facial swelling, mouth sores, sinus pressure, sneezing, sore throat, tinnitus and trouble swallowing.    Eyes: Negative for photophobia, pain, discharge, redness, itching and visual disturbance.   Respiratory: Negative for apnea, chest tightness, shortness of breath, wheezing and stridor.    Cardiovascular: Negative for leg swelling.   Gastrointestinal: Negative for abdominal distention, abdominal pain, anal bleeding, blood in stool, constipation, diarrhea and nausea.   Endocrine: Negative for cold intolerance and heat intolerance.   Genitourinary: Negative for difficulty urinating and dysuria.   Musculoskeletal: Positive for  arthralgias and back pain. Negative for gait problem, joint swelling, myalgias, neck pain and neck stiffness.   Skin: Negative for color change, pallor, rash and wound.   Neurological: Negative for dizziness, seizures, light-headedness and numbness.   Hematological: Negative for adenopathy. Does not bruise/bleed easily.   Psychiatric/Behavioral: Negative for sleep disturbance. The patient is not nervous/anxious.              Objective:         Physical Exam   Constitutional: She is oriented to person, place, and time.   HENT:   Head: Normocephalic and atraumatic.   Right Ear: External ear normal.   Left Ear: External ear normal.   Nose: Nose normal.   Mouth/Throat: Oropharynx is clear and moist. No oropharyngeal exudate.   Eyes: Conjunctivae and EOM are normal. Pupils are equal, round, and reactive to light. Right eye exhibits no discharge. Left eye exhibits no discharge. No scleral icterus.   Neck: Neck supple. No JVD present. No thyromegaly present.   Cardiovascular: Normal rate, regular rhythm, normal heart sounds and intact distal pulses.  Exam reveals no gallop and no friction rub.    No murmur heard.  Pulmonary/Chest: Effort normal and breath sounds normal. No respiratory distress. She has no wheezes. She has no rales. She exhibits no tenderness.   Abdominal: Soft. Bowel sounds are normal. She exhibits no distension and no mass. There is no tenderness. There is no rebound and no guarding.   Lymphadenopathy:     She has no cervical adenopathy.   Neurological: She is alert and oriented to person, place, and time. No cranial nerve deficit. Gait normal. Coordination normal.   Skin: Skin is dry. No rash noted. No erythema. No pallor.     Psychiatric: Affect and judgment normal.   Musculoskeletal: She exhibits tenderness. She exhibits no edema or deformity.   Tenderness of elbows on palpation (resolved)  Tenderness of mcps, pips (resolved)  Tenderness of both ankles (resolved)  Feet: tenderness of mtps   (resolved)            Labs: reviewed  Kacie-negative  Ccp,rf-negative   anticardiolipin Ig M-44  Beta-2 glycoprotein Ig M-130  LAC-negative        Arthritis survey (2018): I personally reviewed;I suspect acute or subacute nondisplaced fracture at the ulnar aspect of the proximal portion of the distal phalanx of the left ring finger extending to the articular surfac     Assessment:     65 year old female with PMH of hypothyroidism, pericardial effusion here for evaluation.  She presented with a bilateral symmetric arthritis consistent with seronegative RA.    1) RA: symptoms almost resolved on prednisone and returning with weaning off prednisone. Patient has been doing well on MTX  8 pills. She had recent transaminitis so will continue to monitor it.    Continue MTX 8 pills once a week  continue  folic acid 1mg po qday  Labs in a month        2) pericardial effusion: reports history of pericardial effusion.  Work up negative.        3) abnormal APS antibodies: denies history of  clotting or miscarriage.  Can consider plaquenil given anti-thrombotic effects.     4) discuss dexa scan history at next visit.

## 2020-12-23 ENCOUNTER — TELEPHONE (OUTPATIENT)
Dept: OBSTETRICS AND GYNECOLOGY | Facility: CLINIC | Age: 65
End: 2020-12-23

## 2020-12-23 DIAGNOSIS — B96.89 BV (BACTERIAL VAGINOSIS): ICD-10-CM

## 2020-12-23 DIAGNOSIS — N76.0 BV (BACTERIAL VAGINOSIS): ICD-10-CM

## 2020-12-23 RX ORDER — METRONIDAZOLE 7.5 MG/G
GEL VAGINAL
Qty: 70 G | Refills: 0 | Status: SHIPPED | OUTPATIENT
Start: 2020-12-23

## 2020-12-23 NOTE — TELEPHONE ENCOUNTER
Pt tested positive for BV, treated with Flagyl but minimal improvement after finishing rx.  C/o discharge.  Metrogel worked well last time. advised if no improvement after taking medication call to be seen    Metrogel pended

## 2020-12-28 ENCOUNTER — TELEPHONE (OUTPATIENT)
Dept: OPTOMETRY | Facility: CLINIC | Age: 65
End: 2020-12-28

## 2020-12-28 NOTE — TELEPHONE ENCOUNTER
----- Message from Roland Oates, [place] sent at 12/28/2020  3:57 PM CST -----  Regarding: RE: Appt  Contact: Celeste gagnonx for one more month  ----- Message -----  From: Jenny Dueñas  Sent: 12/28/2020   3:37 PM CST  To: Roland Oates OD  Subject: FW: Appt                                         Her annual is scheduled in February. Can you extend here contact RX?  ----- Message -----  From: Shiv Kearney  Sent: 12/28/2020   2:55 PM CST  To: Иван Rodriguez  Subject: Appt                                             Pt is calling to try to get a contact lens follow up appt for Dec.2020.      [phone]

## 2021-01-07 ENCOUNTER — LAB VISIT (OUTPATIENT)
Dept: LAB | Facility: OTHER | Age: 66
End: 2021-01-07
Attending: INTERNAL MEDICINE
Payer: COMMERCIAL

## 2021-01-07 DIAGNOSIS — M25.50 POLYARTHRALGIA: ICD-10-CM

## 2021-01-07 LAB
ALBUMIN SERPL BCP-MCNC: 3.9 G/DL (ref 3.5–5.2)
ALP SERPL-CCNC: 66 U/L (ref 55–135)
ALT SERPL W/O P-5'-P-CCNC: 18 U/L (ref 10–44)
ANION GAP SERPL CALC-SCNC: 7 MMOL/L (ref 8–16)
AST SERPL-CCNC: 17 U/L (ref 10–40)
BASOPHILS # BLD AUTO: 0.06 K/UL (ref 0–0.2)
BASOPHILS NFR BLD: 1.1 % (ref 0–1.9)
BILIRUB SERPL-MCNC: 0.4 MG/DL (ref 0.1–1)
BUN SERPL-MCNC: 15 MG/DL (ref 8–23)
CALCIUM SERPL-MCNC: 9.1 MG/DL (ref 8.7–10.5)
CHLORIDE SERPL-SCNC: 110 MMOL/L (ref 95–110)
CO2 SERPL-SCNC: 26 MMOL/L (ref 23–29)
CREAT SERPL-MCNC: 0.8 MG/DL (ref 0.5–1.4)
CRP SERPL-MCNC: 1 MG/L (ref 0–8.2)
DIFFERENTIAL METHOD: ABNORMAL
EOSINOPHIL # BLD AUTO: 0.1 K/UL (ref 0–0.5)
EOSINOPHIL NFR BLD: 1.2 % (ref 0–8)
ERYTHROCYTE [DISTWIDTH] IN BLOOD BY AUTOMATED COUNT: 11.8 % (ref 11.5–14.5)
ERYTHROCYTE [SEDIMENTATION RATE] IN BLOOD: 2 MM/HR (ref 0–20)
EST. GFR  (AFRICAN AMERICAN): >60 ML/MIN/1.73 M^2
EST. GFR  (NON AFRICAN AMERICAN): >60 ML/MIN/1.73 M^2
GLUCOSE SERPL-MCNC: 82 MG/DL (ref 70–110)
HCT VFR BLD AUTO: 39.6 % (ref 37–48.5)
HGB BLD-MCNC: 13 G/DL (ref 12–16)
IMM GRANULOCYTES # BLD AUTO: 0.02 K/UL (ref 0–0.04)
IMM GRANULOCYTES NFR BLD AUTO: 0.4 % (ref 0–0.5)
LYMPHOCYTES # BLD AUTO: 1.3 K/UL (ref 1–4.8)
LYMPHOCYTES NFR BLD: 22.4 % (ref 18–48)
MCH RBC QN AUTO: 31.4 PG (ref 27–31)
MCHC RBC AUTO-ENTMCNC: 32.8 G/DL (ref 32–36)
MCV RBC AUTO: 96 FL (ref 82–98)
MONOCYTES # BLD AUTO: 0.5 K/UL (ref 0.3–1)
MONOCYTES NFR BLD: 8.7 % (ref 4–15)
NEUTROPHILS # BLD AUTO: 3.7 K/UL (ref 1.8–7.7)
NEUTROPHILS NFR BLD: 66.2 % (ref 38–73)
NRBC BLD-RTO: 0 /100 WBC
PLATELET # BLD AUTO: 286 K/UL (ref 150–350)
PMV BLD AUTO: 11.1 FL (ref 9.2–12.9)
POTASSIUM SERPL-SCNC: 4.2 MMOL/L (ref 3.5–5.1)
PROT SERPL-MCNC: 6.6 G/DL (ref 6–8.4)
RBC # BLD AUTO: 4.14 M/UL (ref 4–5.4)
SODIUM SERPL-SCNC: 143 MMOL/L (ref 136–145)
WBC # BLD AUTO: 5.62 K/UL (ref 3.9–12.7)

## 2021-01-07 PROCEDURE — 85025 COMPLETE CBC W/AUTO DIFF WBC: CPT

## 2021-01-07 PROCEDURE — 80053 COMPREHEN METABOLIC PANEL: CPT

## 2021-01-07 PROCEDURE — 85651 RBC SED RATE NONAUTOMATED: CPT

## 2021-01-07 PROCEDURE — 36415 COLL VENOUS BLD VENIPUNCTURE: CPT

## 2021-01-07 PROCEDURE — 86140 C-REACTIVE PROTEIN: CPT

## 2021-01-08 ENCOUNTER — PATIENT MESSAGE (OUTPATIENT)
Dept: RHEUMATOLOGY | Facility: CLINIC | Age: 66
End: 2021-01-08

## 2021-01-13 DIAGNOSIS — Z51.81 ENCOUNTER FOR METHOTREXATE MONITORING: Primary | ICD-10-CM

## 2021-01-13 DIAGNOSIS — Z79.631 ENCOUNTER FOR METHOTREXATE MONITORING: Primary | ICD-10-CM

## 2021-01-21 ENCOUNTER — TELEPHONE (OUTPATIENT)
Dept: OBSTETRICS AND GYNECOLOGY | Facility: CLINIC | Age: 66
End: 2021-01-21

## 2021-01-22 ENCOUNTER — OFFICE VISIT (OUTPATIENT)
Dept: OBSTETRICS AND GYNECOLOGY | Facility: CLINIC | Age: 66
End: 2021-01-22
Payer: COMMERCIAL

## 2021-01-22 VITALS
HEIGHT: 64 IN | SYSTOLIC BLOOD PRESSURE: 114 MMHG | DIASTOLIC BLOOD PRESSURE: 70 MMHG | WEIGHT: 119.06 LBS | BODY MASS INDEX: 20.32 KG/M2

## 2021-01-22 DIAGNOSIS — N76.0 ACUTE VAGINITIS: Primary | ICD-10-CM

## 2021-01-22 PROCEDURE — 87660 TRICHOMONAS VAGIN DIR PROBE: CPT

## 2021-01-22 PROCEDURE — 87510 GARDNER VAG DNA DIR PROBE: CPT

## 2021-01-22 PROCEDURE — 99214 OFFICE O/P EST MOD 30 MIN: CPT | Mod: S$GLB,,, | Performed by: PHYSICIAN ASSISTANT

## 2021-01-22 PROCEDURE — 99999 PR PBB SHADOW E&M-EST. PATIENT-LVL III: ICD-10-PCS | Mod: PBBFAC,,, | Performed by: PHYSICIAN ASSISTANT

## 2021-01-22 PROCEDURE — 1126F AMNT PAIN NOTED NONE PRSNT: CPT | Mod: S$GLB,,, | Performed by: PHYSICIAN ASSISTANT

## 2021-01-22 PROCEDURE — 99214 PR OFFICE/OUTPT VISIT, EST, LEVL IV, 30-39 MIN: ICD-10-PCS | Mod: S$GLB,,, | Performed by: PHYSICIAN ASSISTANT

## 2021-01-22 PROCEDURE — 1126F PR PAIN SEVERITY QUANTIFIED, NO PAIN PRESENT: ICD-10-PCS | Mod: S$GLB,,, | Performed by: PHYSICIAN ASSISTANT

## 2021-01-22 PROCEDURE — 99999 PR PBB SHADOW E&M-EST. PATIENT-LVL III: CPT | Mod: PBBFAC,,, | Performed by: PHYSICIAN ASSISTANT

## 2021-01-22 PROCEDURE — 3008F BODY MASS INDEX DOCD: CPT | Mod: CPTII,S$GLB,, | Performed by: PHYSICIAN ASSISTANT

## 2021-01-22 PROCEDURE — 3008F PR BODY MASS INDEX (BMI) DOCUMENTED: ICD-10-PCS | Mod: CPTII,S$GLB,, | Performed by: PHYSICIAN ASSISTANT

## 2021-01-22 RX ORDER — TINIDAZOLE 500 MG/1
2 TABLET ORAL DAILY
Qty: 8 TABLET | Refills: 0 | Status: SHIPPED | OUTPATIENT
Start: 2021-01-22 | End: 2021-01-24

## 2021-02-01 ENCOUNTER — TELEPHONE (OUTPATIENT)
Dept: RHEUMATOLOGY | Facility: CLINIC | Age: 66
End: 2021-02-01

## 2021-02-22 ENCOUNTER — OFFICE VISIT (OUTPATIENT)
Dept: RHEUMATOLOGY | Facility: CLINIC | Age: 66
End: 2021-02-22
Payer: COMMERCIAL

## 2021-02-22 DIAGNOSIS — M25.561 PAIN AND SWELLING OF RIGHT KNEE: ICD-10-CM

## 2021-02-22 DIAGNOSIS — M06.9 RHEUMATOID ARTHRITIS FLARE: Primary | ICD-10-CM

## 2021-02-22 DIAGNOSIS — Z79.631 ENCOUNTER FOR METHOTREXATE MONITORING: ICD-10-CM

## 2021-02-22 DIAGNOSIS — M25.461 PAIN AND SWELLING OF RIGHT KNEE: ICD-10-CM

## 2021-02-22 DIAGNOSIS — Z51.81 ENCOUNTER FOR METHOTREXATE MONITORING: ICD-10-CM

## 2021-02-22 PROCEDURE — 99214 PR OFFICE/OUTPT VISIT, EST, LEVL IV, 30-39 MIN: ICD-10-PCS | Mod: 95,,, | Performed by: INTERNAL MEDICINE

## 2021-02-22 PROCEDURE — 1125F AMNT PAIN NOTED PAIN PRSNT: CPT | Mod: ,,, | Performed by: INTERNAL MEDICINE

## 2021-02-22 PROCEDURE — 1125F PR PAIN SEVERITY QUANTIFIED, PAIN PRESENT: ICD-10-PCS | Mod: ,,, | Performed by: INTERNAL MEDICINE

## 2021-02-22 PROCEDURE — 99214 OFFICE O/P EST MOD 30 MIN: CPT | Mod: 95,,, | Performed by: INTERNAL MEDICINE

## 2021-02-22 RX ORDER — PREDNISONE 20 MG/1
40 TABLET ORAL DAILY
Qty: 60 TABLET | Refills: 0 | Status: SHIPPED | OUTPATIENT
Start: 2021-02-22 | End: 2021-03-24

## 2021-02-23 ENCOUNTER — PATIENT MESSAGE (OUTPATIENT)
Dept: RHEUMATOLOGY | Facility: CLINIC | Age: 66
End: 2021-02-23

## 2021-02-26 ENCOUNTER — TELEPHONE (OUTPATIENT)
Dept: RHEUMATOLOGY | Facility: CLINIC | Age: 66
End: 2021-02-26

## 2021-03-01 ENCOUNTER — PATIENT MESSAGE (OUTPATIENT)
Dept: RHEUMATOLOGY | Facility: CLINIC | Age: 66
End: 2021-03-01

## 2021-03-05 ENCOUNTER — PATIENT MESSAGE (OUTPATIENT)
Dept: RHEUMATOLOGY | Facility: CLINIC | Age: 66
End: 2021-03-05

## 2021-03-09 ENCOUNTER — TELEPHONE (OUTPATIENT)
Dept: RHEUMATOLOGY | Facility: CLINIC | Age: 66
End: 2021-03-09

## 2021-03-19 DIAGNOSIS — Z78.0 MENOPAUSE: ICD-10-CM

## 2021-03-20 RX ORDER — PROGESTERONE 200 MG/1
CAPSULE ORAL
Qty: 90 CAPSULE | Refills: 3 | Status: SHIPPED | OUTPATIENT
Start: 2021-03-20 | End: 2021-08-11 | Stop reason: SDUPTHER

## 2021-03-24 ENCOUNTER — TELEPHONE (OUTPATIENT)
Dept: RHEUMATOLOGY | Facility: CLINIC | Age: 66
End: 2021-03-24

## 2021-04-06 ENCOUNTER — TELEPHONE (OUTPATIENT)
Dept: OBSTETRICS AND GYNECOLOGY | Facility: CLINIC | Age: 66
End: 2021-04-06

## 2021-04-06 ENCOUNTER — PATIENT MESSAGE (OUTPATIENT)
Dept: OBSTETRICS AND GYNECOLOGY | Facility: CLINIC | Age: 66
End: 2021-04-06

## 2021-04-06 DIAGNOSIS — Z78.0 MENOPAUSE: Primary | ICD-10-CM

## 2021-04-09 ENCOUNTER — TELEPHONE (OUTPATIENT)
Dept: OBSTETRICS AND GYNECOLOGY | Facility: CLINIC | Age: 66
End: 2021-04-09

## 2021-04-16 ENCOUNTER — TELEPHONE (OUTPATIENT)
Dept: RHEUMATOLOGY | Facility: CLINIC | Age: 66
End: 2021-04-16

## 2021-04-18 ENCOUNTER — PATIENT MESSAGE (OUTPATIENT)
Dept: OBSTETRICS AND GYNECOLOGY | Facility: CLINIC | Age: 66
End: 2021-04-18

## 2021-04-19 ENCOUNTER — OFFICE VISIT (OUTPATIENT)
Dept: RHEUMATOLOGY | Facility: CLINIC | Age: 66
End: 2021-04-19
Payer: COMMERCIAL

## 2021-04-19 ENCOUNTER — LAB VISIT (OUTPATIENT)
Dept: LAB | Facility: OTHER | Age: 66
End: 2021-04-19
Attending: OBSTETRICS & GYNECOLOGY
Payer: COMMERCIAL

## 2021-04-19 VITALS
BODY MASS INDEX: 20.7 KG/M2 | DIASTOLIC BLOOD PRESSURE: 71 MMHG | WEIGHT: 120.56 LBS | SYSTOLIC BLOOD PRESSURE: 106 MMHG | HEART RATE: 74 BPM

## 2021-04-19 DIAGNOSIS — Z79.631 ENCOUNTER FOR METHOTREXATE MONITORING: ICD-10-CM

## 2021-04-19 DIAGNOSIS — Z51.81 ENCOUNTER FOR METHOTREXATE MONITORING: ICD-10-CM

## 2021-04-19 DIAGNOSIS — Z78.0 MENOPAUSE: ICD-10-CM

## 2021-04-19 DIAGNOSIS — M06.9 RHEUMATOID ARTHRITIS FLARE: Primary | ICD-10-CM

## 2021-04-19 DIAGNOSIS — D47.2 MGUS (MONOCLONAL GAMMOPATHY OF UNKNOWN SIGNIFICANCE): ICD-10-CM

## 2021-04-19 LAB
25(OH)D3+25(OH)D2 SERPL-MCNC: 42 NG/ML (ref 30–96)
ESTRADIOL SERPL-MCNC: 189 PG/ML
PROGEST SERPL-MCNC: 2.3 NG/ML
T4 FREE SERPL-MCNC: 1.28 NG/DL (ref 0.71–1.51)
TESTOST SERPL-MCNC: 67 NG/DL (ref 5–73)
TSH SERPL DL<=0.005 MIU/L-ACNC: 0.34 UIU/ML (ref 0.4–4)

## 2021-04-19 PROCEDURE — 36415 COLL VENOUS BLD VENIPUNCTURE: CPT | Performed by: OBSTETRICS & GYNECOLOGY

## 2021-04-19 PROCEDURE — 84439 ASSAY OF FREE THYROXINE: CPT | Performed by: OBSTETRICS & GYNECOLOGY

## 2021-04-19 PROCEDURE — 99999 PR PBB SHADOW E&M-EST. PATIENT-LVL III: ICD-10-PCS | Mod: PBBFAC,,, | Performed by: INTERNAL MEDICINE

## 2021-04-19 PROCEDURE — 82306 VITAMIN D 25 HYDROXY: CPT | Performed by: OBSTETRICS & GYNECOLOGY

## 2021-04-19 PROCEDURE — 84403 ASSAY OF TOTAL TESTOSTERONE: CPT | Performed by: OBSTETRICS & GYNECOLOGY

## 2021-04-19 PROCEDURE — 99215 PR OFFICE/OUTPT VISIT, EST, LEVL V, 40-54 MIN: ICD-10-PCS | Mod: S$GLB,,, | Performed by: INTERNAL MEDICINE

## 2021-04-19 PROCEDURE — 84402 ASSAY OF FREE TESTOSTERONE: CPT | Performed by: OBSTETRICS & GYNECOLOGY

## 2021-04-19 PROCEDURE — 82670 ASSAY OF TOTAL ESTRADIOL: CPT | Performed by: OBSTETRICS & GYNECOLOGY

## 2021-04-19 PROCEDURE — 84144 ASSAY OF PROGESTERONE: CPT | Performed by: OBSTETRICS & GYNECOLOGY

## 2021-04-19 PROCEDURE — 3008F PR BODY MASS INDEX (BMI) DOCUMENTED: ICD-10-PCS | Mod: CPTII,S$GLB,, | Performed by: INTERNAL MEDICINE

## 2021-04-19 PROCEDURE — 1126F AMNT PAIN NOTED NONE PRSNT: CPT | Mod: S$GLB,,, | Performed by: INTERNAL MEDICINE

## 2021-04-19 PROCEDURE — 99215 OFFICE O/P EST HI 40 MIN: CPT | Mod: S$GLB,,, | Performed by: INTERNAL MEDICINE

## 2021-04-19 PROCEDURE — 84443 ASSAY THYROID STIM HORMONE: CPT | Performed by: OBSTETRICS & GYNECOLOGY

## 2021-04-19 PROCEDURE — 99999 PR PBB SHADOW E&M-EST. PATIENT-LVL III: CPT | Mod: PBBFAC,,, | Performed by: INTERNAL MEDICINE

## 2021-04-19 PROCEDURE — 1126F PR PAIN SEVERITY QUANTIFIED, NO PAIN PRESENT: ICD-10-PCS | Mod: S$GLB,,, | Performed by: INTERNAL MEDICINE

## 2021-04-19 PROCEDURE — 3008F BODY MASS INDEX DOCD: CPT | Mod: CPTII,S$GLB,, | Performed by: INTERNAL MEDICINE

## 2021-04-19 ASSESSMENT — ROUTINE ASSESSMENT OF PATIENT INDEX DATA (RAPID3)
PSYCHOLOGICAL DISTRESS SCORE: 2.2
PATIENT GLOBAL ASSESSMENT SCORE: 4.5
FATIGUE SCORE: 4.5
PAIN SCORE: 3
TOTAL RAPID3 SCORE: 2.72
MDHAQ FUNCTION SCORE: 0.2
AM STIFFNESS SCORE: 1, YES

## 2021-04-20 ENCOUNTER — TELEPHONE (OUTPATIENT)
Dept: OBSTETRICS AND GYNECOLOGY | Facility: CLINIC | Age: 66
End: 2021-04-20
Payer: COMMERCIAL

## 2021-04-20 ENCOUNTER — LAB VISIT (OUTPATIENT)
Dept: LAB | Facility: OTHER | Age: 66
End: 2021-04-20
Attending: STUDENT IN AN ORGANIZED HEALTH CARE EDUCATION/TRAINING PROGRAM
Payer: COMMERCIAL

## 2021-04-20 ENCOUNTER — OFFICE VISIT (OUTPATIENT)
Dept: HEMATOLOGY/ONCOLOGY | Facility: CLINIC | Age: 66
End: 2021-04-20
Payer: COMMERCIAL

## 2021-04-20 VITALS
RESPIRATION RATE: 16 BRPM | DIASTOLIC BLOOD PRESSURE: 67 MMHG | TEMPERATURE: 97 F | BODY MASS INDEX: 20.14 KG/M2 | SYSTOLIC BLOOD PRESSURE: 104 MMHG | HEART RATE: 77 BPM | WEIGHT: 117.94 LBS | OXYGEN SATURATION: 97 % | HEIGHT: 64 IN

## 2021-04-20 DIAGNOSIS — Z78.0 MENOPAUSE: Primary | ICD-10-CM

## 2021-04-20 DIAGNOSIS — D47.2 MGUS (MONOCLONAL GAMMOPATHY OF UNKNOWN SIGNIFICANCE): ICD-10-CM

## 2021-04-20 DIAGNOSIS — R94.6 ABNORMAL THYROID FUNCTION TEST: ICD-10-CM

## 2021-04-20 DIAGNOSIS — D47.2 MGUS (MONOCLONAL GAMMOPATHY OF UNKNOWN SIGNIFICANCE): Primary | ICD-10-CM

## 2021-04-20 DIAGNOSIS — M06.09 RHEUMATOID ARTHRITIS OF MULTIPLE SITES WITH NEGATIVE RHEUMATOID FACTOR: ICD-10-CM

## 2021-04-20 DIAGNOSIS — E03.8 OTHER SPECIFIED HYPOTHYROIDISM: ICD-10-CM

## 2021-04-20 LAB — LDH SERPL L TO P-CCNC: 192 U/L (ref 110–260)

## 2021-04-20 PROCEDURE — 82232 ASSAY OF BETA-2 PROTEIN: CPT | Performed by: STUDENT IN AN ORGANIZED HEALTH CARE EDUCATION/TRAINING PROGRAM

## 2021-04-20 PROCEDURE — 1126F PR PAIN SEVERITY QUANTIFIED, NO PAIN PRESENT: ICD-10-PCS | Mod: S$GLB,,, | Performed by: STUDENT IN AN ORGANIZED HEALTH CARE EDUCATION/TRAINING PROGRAM

## 2021-04-20 PROCEDURE — 99205 OFFICE O/P NEW HI 60 MIN: CPT | Mod: S$GLB,,, | Performed by: STUDENT IN AN ORGANIZED HEALTH CARE EDUCATION/TRAINING PROGRAM

## 2021-04-20 PROCEDURE — 99999 PR PBB SHADOW E&M-EST. PATIENT-LVL V: CPT | Mod: PBBFAC,,, | Performed by: STUDENT IN AN ORGANIZED HEALTH CARE EDUCATION/TRAINING PROGRAM

## 2021-04-20 PROCEDURE — 84165 PROTEIN E-PHORESIS SERUM: CPT | Performed by: STUDENT IN AN ORGANIZED HEALTH CARE EDUCATION/TRAINING PROGRAM

## 2021-04-20 PROCEDURE — 83615 LACTATE (LD) (LDH) ENZYME: CPT | Performed by: STUDENT IN AN ORGANIZED HEALTH CARE EDUCATION/TRAINING PROGRAM

## 2021-04-20 PROCEDURE — 36415 COLL VENOUS BLD VENIPUNCTURE: CPT | Performed by: STUDENT IN AN ORGANIZED HEALTH CARE EDUCATION/TRAINING PROGRAM

## 2021-04-20 PROCEDURE — 86334 IMMUNOFIX E-PHORESIS SERUM: CPT | Mod: 26,,, | Performed by: PATHOLOGY

## 2021-04-20 PROCEDURE — 99999 PR PBB SHADOW E&M-EST. PATIENT-LVL V: ICD-10-PCS | Mod: PBBFAC,,, | Performed by: STUDENT IN AN ORGANIZED HEALTH CARE EDUCATION/TRAINING PROGRAM

## 2021-04-20 PROCEDURE — 82784 ASSAY IGA/IGD/IGG/IGM EACH: CPT | Mod: 59 | Performed by: STUDENT IN AN ORGANIZED HEALTH CARE EDUCATION/TRAINING PROGRAM

## 2021-04-20 PROCEDURE — 3008F PR BODY MASS INDEX (BMI) DOCUMENTED: ICD-10-PCS | Mod: CPTII,S$GLB,, | Performed by: STUDENT IN AN ORGANIZED HEALTH CARE EDUCATION/TRAINING PROGRAM

## 2021-04-20 PROCEDURE — 86334 IMMUNOFIX E-PHORESIS SERUM: CPT | Performed by: STUDENT IN AN ORGANIZED HEALTH CARE EDUCATION/TRAINING PROGRAM

## 2021-04-20 PROCEDURE — 99205 PR OFFICE/OUTPT VISIT, NEW, LEVL V, 60-74 MIN: ICD-10-PCS | Mod: S$GLB,,, | Performed by: STUDENT IN AN ORGANIZED HEALTH CARE EDUCATION/TRAINING PROGRAM

## 2021-04-20 PROCEDURE — 3008F BODY MASS INDEX DOCD: CPT | Mod: CPTII,S$GLB,, | Performed by: STUDENT IN AN ORGANIZED HEALTH CARE EDUCATION/TRAINING PROGRAM

## 2021-04-20 PROCEDURE — 86334 PATHOLOGIST INTERPRETATION IFE: ICD-10-PCS | Mod: 26,,, | Performed by: PATHOLOGY

## 2021-04-20 PROCEDURE — 84165 PROTEIN E-PHORESIS SERUM: CPT | Mod: 26,,, | Performed by: PATHOLOGY

## 2021-04-20 PROCEDURE — 83520 IMMUNOASSAY QUANT NOS NONAB: CPT | Performed by: STUDENT IN AN ORGANIZED HEALTH CARE EDUCATION/TRAINING PROGRAM

## 2021-04-20 PROCEDURE — 84165 PATHOLOGIST INTERPRETATION SPE: ICD-10-PCS | Mod: 26,,, | Performed by: PATHOLOGY

## 2021-04-20 PROCEDURE — 1126F AMNT PAIN NOTED NONE PRSNT: CPT | Mod: S$GLB,,, | Performed by: STUDENT IN AN ORGANIZED HEALTH CARE EDUCATION/TRAINING PROGRAM

## 2021-04-21 LAB
ALBUMIN SERPL ELPH-MCNC: 3.92 G/DL (ref 3.35–5.55)
ALPHA1 GLOB SERPL ELPH-MCNC: 0.27 G/DL (ref 0.17–0.41)
ALPHA2 GLOB SERPL ELPH-MCNC: 0.61 G/DL (ref 0.43–0.99)
B-GLOBULIN SERPL ELPH-MCNC: 0.57 G/DL (ref 0.5–1.1)
B2 MICROGLOB SERPL-MCNC: 1.5 UG/ML (ref 0–2.5)
GAMMA GLOB SERPL ELPH-MCNC: 0.53 G/DL (ref 0.67–1.58)
IGA SERPL-MCNC: 82 MG/DL (ref 40–350)
IGG SERPL-MCNC: 376 MG/DL (ref 650–1600)
IGM SERPL-MCNC: 330 MG/DL (ref 50–300)
INTERPRETATION SERPL IFE-IMP: NORMAL
KAPPA LC SER QL IA: 1.03 MG/DL (ref 0.33–1.94)
KAPPA LC/LAMBDA SER IA: 0.75 (ref 0.26–1.65)
LAMBDA LC SER QL IA: 1.38 MG/DL (ref 0.57–2.63)
PROT SERPL-MCNC: 5.9 G/DL (ref 6–8.4)

## 2021-04-22 LAB
PATHOLOGIST INTERPRETATION IFE: NORMAL
PATHOLOGIST INTERPRETATION SPE: NORMAL
TESTOST FREE SERPL-MCNC: 0.4 PG/ML

## 2021-04-26 ENCOUNTER — TELEPHONE (OUTPATIENT)
Dept: OBSTETRICS AND GYNECOLOGY | Facility: CLINIC | Age: 66
End: 2021-04-26

## 2021-04-28 ENCOUNTER — TELEPHONE (OUTPATIENT)
Dept: RHEUMATOLOGY | Facility: CLINIC | Age: 66
End: 2021-04-28

## 2021-04-29 ENCOUNTER — PATIENT MESSAGE (OUTPATIENT)
Dept: RHEUMATOLOGY | Facility: CLINIC | Age: 66
End: 2021-04-29

## 2021-05-25 DIAGNOSIS — A60.00 GENITAL HERPES SIMPLEX, UNSPECIFIED SITE: ICD-10-CM

## 2021-05-26 RX ORDER — VALACYCLOVIR HYDROCHLORIDE 500 MG/1
500 TABLET, FILM COATED ORAL DAILY
Qty: 60 TABLET | Refills: 2 | Status: SHIPPED | OUTPATIENT
Start: 2021-05-26 | End: 2021-07-19

## 2021-06-17 DIAGNOSIS — M06.9 RHEUMATOID ARTHRITIS FLARE: ICD-10-CM

## 2021-06-17 DIAGNOSIS — Z51.81 ENCOUNTER FOR METHOTREXATE MONITORING: ICD-10-CM

## 2021-06-17 DIAGNOSIS — Z79.631 ENCOUNTER FOR METHOTREXATE MONITORING: ICD-10-CM

## 2021-06-17 RX ORDER — METHOTREXATE 2.5 MG/1
20 TABLET ORAL
Qty: 96 TABLET | Refills: 0 | Status: SHIPPED | OUTPATIENT
Start: 2021-06-17

## 2021-06-18 ENCOUNTER — TELEPHONE (OUTPATIENT)
Dept: OBSTETRICS AND GYNECOLOGY | Facility: CLINIC | Age: 66
End: 2021-06-18
Payer: COMMERCIAL

## 2021-06-21 ENCOUNTER — OFFICE VISIT (OUTPATIENT)
Dept: OBSTETRICS AND GYNECOLOGY | Facility: CLINIC | Age: 66
End: 2021-06-21
Attending: OBSTETRICS & GYNECOLOGY
Payer: COMMERCIAL

## 2021-06-21 VITALS
BODY MASS INDEX: 19.93 KG/M2 | HEIGHT: 64 IN | SYSTOLIC BLOOD PRESSURE: 90 MMHG | DIASTOLIC BLOOD PRESSURE: 64 MMHG | WEIGHT: 116.75 LBS

## 2021-06-21 DIAGNOSIS — N90.89 SKIN TAG OF VULVA: ICD-10-CM

## 2021-06-21 DIAGNOSIS — Z78.0 MENOPAUSE: Primary | ICD-10-CM

## 2021-06-21 PROCEDURE — 99999 PR PBB SHADOW E&M-EST. PATIENT-LVL IV: CPT | Mod: PBBFAC,,, | Performed by: OBSTETRICS & GYNECOLOGY

## 2021-06-21 PROCEDURE — 99999 PR PBB SHADOW E&M-EST. PATIENT-LVL IV: ICD-10-PCS | Mod: PBBFAC,,, | Performed by: OBSTETRICS & GYNECOLOGY

## 2021-06-21 PROCEDURE — 99214 PR OFFICE/OUTPT VISIT, EST, LEVL IV, 30-39 MIN: ICD-10-PCS | Mod: 25,S$GLB,, | Performed by: OBSTETRICS & GYNECOLOGY

## 2021-06-21 PROCEDURE — 56501 PR DESTRUCTION,LESION(S),VULVA,SIMPLE: ICD-10-PCS | Mod: S$GLB,,, | Performed by: OBSTETRICS & GYNECOLOGY

## 2021-06-21 PROCEDURE — 56501 DESTROY VULVA LESIONS SIM: CPT | Mod: S$GLB,,, | Performed by: OBSTETRICS & GYNECOLOGY

## 2021-06-21 PROCEDURE — 99214 OFFICE O/P EST MOD 30 MIN: CPT | Mod: 25,S$GLB,, | Performed by: OBSTETRICS & GYNECOLOGY

## 2021-06-21 RX ORDER — SERTRALINE HYDROCHLORIDE 100 MG/1
100 TABLET, FILM COATED ORAL 2 TIMES DAILY
COMMUNITY
Start: 2021-06-18

## 2021-06-21 RX ORDER — MELOXICAM 15 MG/1
15 TABLET ORAL DAILY
COMMUNITY
Start: 2021-05-25

## 2021-06-23 RX ORDER — FOLIC ACID 1 MG/1
1 TABLET ORAL DAILY
Qty: 90 TABLET | Refills: 4 | Status: SHIPPED | OUTPATIENT
Start: 2021-06-23 | End: 2021-09-20

## 2021-07-26 DIAGNOSIS — Z78.0 MENOPAUSE: ICD-10-CM

## 2021-07-26 RX ORDER — ESTRADIOL 2 MG/1
2 TABLET ORAL DAILY
Qty: 90 TABLET | Refills: 1 | Status: SHIPPED | OUTPATIENT
Start: 2021-07-26 | End: 2022-01-24 | Stop reason: SDUPTHER

## 2021-08-11 DIAGNOSIS — Z78.0 MENOPAUSE: ICD-10-CM

## 2021-08-11 RX ORDER — PROGESTERONE 200 MG/1
CAPSULE ORAL
Qty: 90 CAPSULE | Refills: 3 | Status: SHIPPED | OUTPATIENT
Start: 2021-08-11

## 2021-09-13 ENCOUNTER — TELEPHONE (OUTPATIENT)
Dept: RHEUMATOLOGY | Facility: CLINIC | Age: 66
End: 2021-09-13

## 2021-09-13 ENCOUNTER — PATIENT MESSAGE (OUTPATIENT)
Dept: RHEUMATOLOGY | Facility: CLINIC | Age: 66
End: 2021-09-13

## 2021-09-27 ENCOUNTER — TELEPHONE (OUTPATIENT)
Dept: OBSTETRICS AND GYNECOLOGY | Facility: CLINIC | Age: 66
End: 2021-09-27

## 2021-09-27 DIAGNOSIS — Z78.0 MENOPAUSE: Primary | ICD-10-CM

## 2021-10-01 ENCOUNTER — TELEPHONE (OUTPATIENT)
Dept: OBSTETRICS AND GYNECOLOGY | Facility: CLINIC | Age: 66
End: 2021-10-01

## 2021-10-05 ENCOUNTER — TELEPHONE (OUTPATIENT)
Dept: OBSTETRICS AND GYNECOLOGY | Facility: CLINIC | Age: 66
End: 2021-10-05

## 2021-10-08 ENCOUNTER — TELEPHONE (OUTPATIENT)
Dept: OBSTETRICS AND GYNECOLOGY | Facility: CLINIC | Age: 66
End: 2021-10-08

## 2021-10-11 ENCOUNTER — TELEPHONE (OUTPATIENT)
Dept: OBSTETRICS AND GYNECOLOGY | Facility: CLINIC | Age: 66
End: 2021-10-11

## 2021-10-14 ENCOUNTER — TELEPHONE (OUTPATIENT)
Dept: OBSTETRICS AND GYNECOLOGY | Facility: CLINIC | Age: 66
End: 2021-10-14

## 2021-10-15 ENCOUNTER — TELEPHONE (OUTPATIENT)
Dept: OBSTETRICS AND GYNECOLOGY | Facility: CLINIC | Age: 66
End: 2021-10-15

## 2021-10-20 ENCOUNTER — TELEPHONE (OUTPATIENT)
Dept: OBSTETRICS AND GYNECOLOGY | Facility: CLINIC | Age: 66
End: 2021-10-20

## 2021-11-03 ENCOUNTER — TELEPHONE (OUTPATIENT)
Dept: HEMATOLOGY/ONCOLOGY | Facility: CLINIC | Age: 66
End: 2021-11-03
Payer: COMMERCIAL

## 2022-01-24 DIAGNOSIS — Z78.0 MENOPAUSE: ICD-10-CM

## 2022-01-24 RX ORDER — ESTRADIOL 2 MG/1
2 TABLET ORAL DAILY
Qty: 90 TABLET | Refills: 0 | Status: SHIPPED | OUTPATIENT
Start: 2022-01-24 | End: 2022-04-24

## 2022-05-31 ENCOUNTER — TELEPHONE (OUTPATIENT)
Dept: OBSTETRICS AND GYNECOLOGY | Facility: CLINIC | Age: 67
End: 2022-05-31
Payer: COMMERCIAL

## 2022-06-03 ENCOUNTER — TELEPHONE (OUTPATIENT)
Dept: OBSTETRICS AND GYNECOLOGY | Facility: CLINIC | Age: 67
End: 2022-06-03
Payer: COMMERCIAL

## 2022-06-03 NOTE — TELEPHONE ENCOUNTER
Lmor to pt that she needs Hrt f/u and annual appt scheduled for refill of her Testosterone cream  She needs to call and make annual + Hrt f/u appt  And needs Test labs done     Last annual 12-7-20  Last T labs done 9-27-21 (in media)

## 2024-07-25 NOTE — TELEPHONE ENCOUNTER
----- Message from Jacqui Strange MD sent at 6/21/2018  4:41 PM CDT -----  Contact: Indira hawley/[facility]    Hi  Tell them that the medication is once weekly and that's how I wrote it in script and also how I told patient.     ----- Message -----  From: Paty Peralta RN  Sent: 6/21/2018   4:35 PM  To: Jacqui Strange MD        ----- Message -----  From: Claire Simons  Sent: 6/21/2018   3:47 PM  To: Alexandr Osman Staff    Indira is calling in regards to an e-script for methotrexate 2.5 MG Tab. Pt is questioning the directions that she put on the prescription. Pt is stating that the Doctor told her to take one tablet every seven days.    Indira would like a call back at [phone].     Thank you     
Pharmacy informed on dosage.  
show

## 2024-09-26 PROBLEM — Z00.00 ENCOUNTER FOR PREVENTIVE HEALTH EXAMINATION: Status: ACTIVE | Noted: 2024-09-26

## 2024-09-27 ENCOUNTER — APPOINTMENT (OUTPATIENT)
Dept: PULMONOLOGY | Facility: CLINIC | Age: 69
End: 2024-09-27
Payer: MEDICARE

## 2024-09-27 VITALS
SYSTOLIC BLOOD PRESSURE: 100 MMHG | HEART RATE: 79 BPM | OXYGEN SATURATION: 99 % | WEIGHT: 115 LBS | HEIGHT: 64 IN | BODY MASS INDEX: 19.63 KG/M2 | DIASTOLIC BLOOD PRESSURE: 60 MMHG

## 2024-09-27 DIAGNOSIS — G47.33 OBSTRUCTIVE SLEEP APNEA (ADULT) (PEDIATRIC): ICD-10-CM

## 2024-09-27 RX ORDER — SERTRALINE HYDROCHLORIDE 100 MG/1
100 TABLET, FILM COATED ORAL
Refills: 0 | Status: ACTIVE | COMMUNITY

## 2024-09-27 RX ORDER — LEVOTHYROXINE SODIUM 125 UG/1
125 TABLET ORAL
Refills: 0 | Status: ACTIVE | COMMUNITY

## 2024-09-27 NOTE — HISTORY OF PRESENT ILLNESS
[TextBox_4] : [age]-year-old female with a history of questionable sleep apnea for evaluation.  Patient had a prior history of loud snoring.  However recently her daughter stated that she did not seem to snore very loud when she was with her.  She sleeps from 12 midnight to 8 AM and generally sleeps through the night.  She feels tired in the morning but okay during the day.  Her Harriman score is 0.  She wakes up with a very dry mouth.  In 2023 she underwent a home sleep study on 2/21/2023 for evaluation of questionable sleep apnea.  This reveals a respiratory disturbance index of 28.9 with a nicki O2 sat of 84.  Patient was given CPAP but she was noncompliant.  She simply was very uncomfortable with CPAP and cannot use it.  She now comes in for another opinion regarding whether there is anything else she can do for sleep apnea.  Past medical history of rheumatoid arthritis on methotrexate, hypothyroidism, depression.  She is on Zoloft.  She smoked a small amount in her 20s and 30s but quit more than 30 years ago.  No history of alcohol use.  She is 5 feet 4 weight 120 pounds.

## 2024-09-27 NOTE — ASSESSMENT
[FreeTextEntry1] : Patient with a history of moderate sleep apnea on a home study dated 2/.  Patient would like to consider the use of an oral appliance.  I will repeat a home study to obtain a more recent evaluation and then refer patient appropriately.

## 2025-01-16 NOTE — TELEPHONE ENCOUNTER
Please call pt and let her know urine culture is growing bacteria to continue med until we get a final report   Spoke to Francisco Javier and he stated patient picked up the prescription for the 5mg for 9 days but never picked up the 10mg prescription.